# Patient Record
Sex: MALE | Race: BLACK OR AFRICAN AMERICAN | Employment: UNEMPLOYED | URBAN - METROPOLITAN AREA
[De-identification: names, ages, dates, MRNs, and addresses within clinical notes are randomized per-mention and may not be internally consistent; named-entity substitution may affect disease eponyms.]

---
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
## 2025-07-01 ENCOUNTER — APPOINTMENT (OUTPATIENT)
Dept: CT IMAGING | Age: 25
DRG: 720 | End: 2025-07-01
Payer: COMMERCIAL

## 2025-07-01 ENCOUNTER — APPOINTMENT (OUTPATIENT)
Dept: GENERAL RADIOLOGY | Age: 25
DRG: 720 | End: 2025-07-01
Payer: COMMERCIAL

## 2025-07-01 ENCOUNTER — HOSPITAL ENCOUNTER (INPATIENT)
Age: 25
LOS: 14 days | Discharge: NURSING, REHAB OR LONG-TERM CARE | DRG: 720 | End: 2025-07-15
Attending: EMERGENCY MEDICINE | Admitting: INTERNAL MEDICINE
Payer: COMMERCIAL

## 2025-07-01 DIAGNOSIS — R10.84 GENERALIZED ABDOMINAL PAIN: ICD-10-CM

## 2025-07-01 DIAGNOSIS — R65.10 SIRS (SYSTEMIC INFLAMMATORY RESPONSE SYNDROME) (HCC): ICD-10-CM

## 2025-07-01 DIAGNOSIS — E86.0 DEHYDRATION: Primary | ICD-10-CM

## 2025-07-01 DIAGNOSIS — I42.9 CARDIOMYOPATHY, UNSPECIFIED TYPE (HCC): ICD-10-CM

## 2025-07-01 DIAGNOSIS — F41.9 ANXIETY: ICD-10-CM

## 2025-07-01 PROBLEM — A41.9 SEPSIS (HCC): Status: ACTIVE | Noted: 2025-07-01

## 2025-07-01 LAB
ALBUMIN SERPL-MCNC: 4.1 G/DL (ref 3.4–5)
ALBUMIN/GLOB SERPL: 0.7 {RATIO} (ref 1.1–2.2)
ALP SERPL-CCNC: 139 U/L (ref 40–129)
ALT SERPL-CCNC: 8 U/L (ref 10–40)
ANION GAP SERPL CALCULATED.3IONS-SCNC: 19 MMOL/L (ref 3–16)
AST SERPL-CCNC: 20 U/L (ref 15–37)
BASOPHILS # BLD: 0.1 K/UL (ref 0–0.2)
BASOPHILS NFR BLD: 0.3 %
BILIRUB SERPL-MCNC: 0.6 MG/DL (ref 0–1)
BUN SERPL-MCNC: 38 MG/DL (ref 7–20)
CALCIUM SERPL-MCNC: 10.1 MG/DL (ref 8.3–10.6)
CHLORIDE SERPL-SCNC: 80 MMOL/L (ref 99–110)
CO2 SERPL-SCNC: 26 MMOL/L (ref 21–32)
CREAT SERPL-MCNC: 2.5 MG/DL (ref 0.9–1.3)
DEPRECATED RDW RBC AUTO: 17 % (ref 12.4–15.4)
EOSINOPHIL # BLD: 0 K/UL (ref 0–0.6)
EOSINOPHIL NFR BLD: 0 %
GFR SERPLBLD CREATININE-BSD FMLA CKD-EPI: 36 ML/MIN/{1.73_M2}
GLUCOSE SERPL-MCNC: 138 MG/DL (ref 70–99)
HCT VFR BLD AUTO: 31 % (ref 40.5–52.5)
HGB BLD-MCNC: 9.6 G/DL (ref 13.5–17.5)
LACTATE BLDV-SCNC: 1.6 MMOL/L (ref 0.4–1.9)
LACTATE BLDV-SCNC: 3.5 MMOL/L (ref 0.4–2)
LIPASE SERPL-CCNC: 139 U/L (ref 13–60)
LYMPHOCYTES # BLD: 2.8 K/UL (ref 1–5.1)
LYMPHOCYTES NFR BLD: 12.9 %
MAGNESIUM SERPL-MCNC: 1.9 MG/DL (ref 1.8–2.4)
MCH RBC QN AUTO: 24.1 PG (ref 26–34)
MCHC RBC AUTO-ENTMCNC: 31.1 G/DL (ref 31–36)
MCV RBC AUTO: 77.6 FL (ref 80–100)
MONOCYTES # BLD: 1.3 K/UL (ref 0–1.3)
MONOCYTES NFR BLD: 5.7 %
NEUTROPHILS # BLD: 18 K/UL (ref 1.7–7.7)
NEUTROPHILS NFR BLD: 81.1 %
PHOSPHATE SERPL-MCNC: 4.8 MG/DL (ref 2.5–4.9)
PLATELET # BLD AUTO: 620 K/UL (ref 135–450)
PMV BLD AUTO: 7.4 FL (ref 5–10.5)
POTASSIUM SERPL-SCNC: 3.9 MMOL/L (ref 3.5–5.1)
PROT SERPL-MCNC: 9.6 G/DL (ref 6.4–8.2)
RBC # BLD AUTO: 3.99 M/UL (ref 4.2–5.9)
SODIUM SERPL-SCNC: 125 MMOL/L (ref 136–145)
WBC # BLD AUTO: 22.1 K/UL (ref 4–11)

## 2025-07-01 PROCEDURE — 36415 COLL VENOUS BLD VENIPUNCTURE: CPT

## 2025-07-01 PROCEDURE — 93005 ELECTROCARDIOGRAM TRACING: CPT | Performed by: EMERGENCY MEDICINE

## 2025-07-01 PROCEDURE — 6360000002 HC RX W HCPCS

## 2025-07-01 PROCEDURE — 6360000004 HC RX CONTRAST MEDICATION

## 2025-07-01 PROCEDURE — 85025 COMPLETE CBC W/AUTO DIFF WBC: CPT

## 2025-07-01 PROCEDURE — 84100 ASSAY OF PHOSPHORUS: CPT

## 2025-07-01 PROCEDURE — 2580000003 HC RX 258

## 2025-07-01 PROCEDURE — 96376 TX/PRO/DX INJ SAME DRUG ADON: CPT

## 2025-07-01 PROCEDURE — 87040 BLOOD CULTURE FOR BACTERIA: CPT

## 2025-07-01 PROCEDURE — 96366 THER/PROPH/DIAG IV INF ADDON: CPT

## 2025-07-01 PROCEDURE — 2060000000 HC ICU INTERMEDIATE R&B

## 2025-07-01 PROCEDURE — 96375 TX/PRO/DX INJ NEW DRUG ADDON: CPT

## 2025-07-01 PROCEDURE — 74177 CT ABD & PELVIS W/CONTRAST: CPT

## 2025-07-01 PROCEDURE — 96365 THER/PROPH/DIAG IV INF INIT: CPT

## 2025-07-01 PROCEDURE — 83605 ASSAY OF LACTIC ACID: CPT

## 2025-07-01 PROCEDURE — 80053 COMPREHEN METABOLIC PANEL: CPT

## 2025-07-01 PROCEDURE — 96361 HYDRATE IV INFUSION ADD-ON: CPT

## 2025-07-01 PROCEDURE — 99285 EMERGENCY DEPT VISIT HI MDM: CPT

## 2025-07-01 PROCEDURE — 87150 DNA/RNA AMPLIFIED PROBE: CPT

## 2025-07-01 PROCEDURE — 2580000003 HC RX 258: Performed by: PHYSICIAN ASSISTANT

## 2025-07-01 PROCEDURE — 83735 ASSAY OF MAGNESIUM: CPT

## 2025-07-01 PROCEDURE — 96367 TX/PROPH/DG ADDL SEQ IV INF: CPT

## 2025-07-01 PROCEDURE — 71045 X-RAY EXAM CHEST 1 VIEW: CPT

## 2025-07-01 PROCEDURE — 83690 ASSAY OF LIPASE: CPT

## 2025-07-01 PROCEDURE — 99223 1ST HOSP IP/OBS HIGH 75: CPT | Performed by: SURGERY

## 2025-07-01 RX ORDER — SENNOSIDES 8.6 MG/1
1 TABLET ORAL 2 TIMES DAILY
COMMUNITY

## 2025-07-01 RX ORDER — POTASSIUM CHLORIDE 7.45 MG/ML
10 INJECTION INTRAVENOUS PRN
Status: DISCONTINUED | OUTPATIENT
Start: 2025-07-01 | End: 2025-07-06 | Stop reason: SDUPTHER

## 2025-07-01 RX ORDER — MIDODRINE HYDROCHLORIDE 5 MG/1
5 TABLET ORAL EVERY 8 HOURS PRN
COMMUNITY

## 2025-07-01 RX ORDER — POLYETHYLENE GLYCOL 3350 17 G/17G
17 POWDER, FOR SOLUTION ORAL
COMMUNITY

## 2025-07-01 RX ORDER — ONDANSETRON 4 MG/1
4 TABLET, FILM COATED ORAL
COMMUNITY

## 2025-07-01 RX ORDER — HYDROMORPHONE HYDROCHLORIDE 1 MG/ML
0.5 INJECTION, SOLUTION INTRAMUSCULAR; INTRAVENOUS; SUBCUTANEOUS EVERY 4 HOURS PRN
Status: DISCONTINUED | OUTPATIENT
Start: 2025-07-01 | End: 2025-07-02

## 2025-07-01 RX ORDER — ENOXAPARIN SODIUM 100 MG/ML
40 INJECTION SUBCUTANEOUS DAILY
Status: DISCONTINUED | OUTPATIENT
Start: 2025-07-02 | End: 2025-07-01

## 2025-07-01 RX ORDER — PANTOPRAZOLE SODIUM 40 MG/1
40 FOR SUSPENSION ORAL
COMMUNITY

## 2025-07-01 RX ORDER — METOPROLOL SUCCINATE 100 MG/1
100 TABLET, EXTENDED RELEASE ORAL DAILY
Status: ON HOLD | COMMUNITY
End: 2025-07-15

## 2025-07-01 RX ORDER — PROMETHAZINE HYDROCHLORIDE 25 MG/1
25 TABLET ORAL EVERY 6 HOURS PRN
COMMUNITY

## 2025-07-01 RX ORDER — SODIUM CHLORIDE 9 MG/ML
INJECTION, SOLUTION INTRAVENOUS CONTINUOUS
Status: ACTIVE | OUTPATIENT
Start: 2025-07-01 | End: 2025-07-02

## 2025-07-01 RX ORDER — 0.9 % SODIUM CHLORIDE 0.9 %
500 INTRAVENOUS SOLUTION INTRAVENOUS ONCE
Status: COMPLETED | OUTPATIENT
Start: 2025-07-01 | End: 2025-07-01

## 2025-07-01 RX ORDER — POTASSIUM CHLORIDE 1.5 G/1.58G
20 POWDER, FOR SOLUTION ORAL DAILY
Status: ON HOLD | COMMUNITY
End: 2025-07-15

## 2025-07-01 RX ORDER — M-VIT,TX,IRON,MINS/CALC/FOLIC 27MG-0.4MG
1 TABLET ORAL DAILY
COMMUNITY

## 2025-07-01 RX ORDER — BENZOYL PEROXIDE 100 MG/ML
LOTION TOPICAL
COMMUNITY

## 2025-07-01 RX ORDER — ACETAMINOPHEN 500 MG
500 TABLET ORAL EVERY 6 HOURS PRN
COMMUNITY

## 2025-07-01 RX ORDER — ONDANSETRON 2 MG/ML
4 INJECTION INTRAMUSCULAR; INTRAVENOUS EVERY 6 HOURS PRN
Status: DISCONTINUED | OUTPATIENT
Start: 2025-07-01 | End: 2025-07-15 | Stop reason: HOSPADM

## 2025-07-01 RX ORDER — POLYETHYLENE GLYCOL 3350 17 G/17G
17 POWDER, FOR SOLUTION ORAL DAILY PRN
Status: DISCONTINUED | OUTPATIENT
Start: 2025-07-01 | End: 2025-07-02

## 2025-07-01 RX ORDER — DULOXETIN HYDROCHLORIDE 30 MG/1
30 CAPSULE, DELAYED RELEASE ORAL DAILY
COMMUNITY
End: 2025-07-05

## 2025-07-01 RX ORDER — ACETAMINOPHEN 325 MG/1
650 TABLET ORAL EVERY 6 HOURS PRN
Status: DISCONTINUED | OUTPATIENT
Start: 2025-07-01 | End: 2025-07-02 | Stop reason: SDUPTHER

## 2025-07-01 RX ORDER — HEPARIN SODIUM 5000 [USP'U]/ML
5000 INJECTION, SOLUTION INTRAVENOUS; SUBCUTANEOUS 2 TIMES DAILY
Status: DISCONTINUED | OUTPATIENT
Start: 2025-07-01 | End: 2025-07-02

## 2025-07-01 RX ORDER — ACETAMINOPHEN 650 MG/1
650 SUPPOSITORY RECTAL EVERY 6 HOURS PRN
Status: DISCONTINUED | OUTPATIENT
Start: 2025-07-01 | End: 2025-07-02 | Stop reason: SDUPTHER

## 2025-07-01 RX ORDER — SODIUM CHLORIDE 0.9 % (FLUSH) 0.9 %
5-40 SYRINGE (ML) INJECTION PRN
Status: DISCONTINUED | OUTPATIENT
Start: 2025-07-01 | End: 2025-07-15 | Stop reason: HOSPADM

## 2025-07-01 RX ORDER — DICYCLOMINE HYDROCHLORIDE 10 MG/1
10 CAPSULE ORAL
COMMUNITY

## 2025-07-01 RX ORDER — SODIUM CHLORIDE, SODIUM LACTATE, POTASSIUM CHLORIDE, AND CALCIUM CHLORIDE .6; .31; .03; .02 G/100ML; G/100ML; G/100ML; G/100ML
1000 INJECTION, SOLUTION INTRAVENOUS ONCE
Status: COMPLETED | OUTPATIENT
Start: 2025-07-01 | End: 2025-07-01

## 2025-07-01 RX ORDER — MIRTAZAPINE 15 MG/1
15 TABLET, FILM COATED ORAL NIGHTLY
COMMUNITY

## 2025-07-01 RX ORDER — ACETAMINOPHEN 500 MG
1000 TABLET ORAL EVERY 6 HOURS
Status: DISCONTINUED | OUTPATIENT
Start: 2025-07-01 | End: 2025-07-01

## 2025-07-01 RX ORDER — OXYCODONE HYDROCHLORIDE 5 MG/1
5 TABLET ORAL EVERY 4 HOURS PRN
Refills: 0 | Status: DISCONTINUED | OUTPATIENT
Start: 2025-07-01 | End: 2025-07-01

## 2025-07-01 RX ORDER — CLINDAMYCIN PHOSPHATE 10 MG/G
GEL TOPICAL
COMMUNITY

## 2025-07-01 RX ORDER — IOPAMIDOL 755 MG/ML
75 INJECTION, SOLUTION INTRAVASCULAR
Status: COMPLETED | OUTPATIENT
Start: 2025-07-01 | End: 2025-07-01

## 2025-07-01 RX ORDER — MAGNESIUM SULFATE IN WATER 40 MG/ML
2000 INJECTION, SOLUTION INTRAVENOUS PRN
Status: DISCONTINUED | OUTPATIENT
Start: 2025-07-01 | End: 2025-07-15 | Stop reason: HOSPADM

## 2025-07-01 RX ORDER — HYDROMORPHONE HYDROCHLORIDE 1 MG/ML
1 INJECTION, SOLUTION INTRAMUSCULAR; INTRAVENOUS; SUBCUTANEOUS ONCE
Status: COMPLETED | OUTPATIENT
Start: 2025-07-01 | End: 2025-07-01

## 2025-07-01 RX ORDER — ONDANSETRON 4 MG/1
4 TABLET, ORALLY DISINTEGRATING ORAL EVERY 8 HOURS PRN
Status: DISCONTINUED | OUTPATIENT
Start: 2025-07-01 | End: 2025-07-15 | Stop reason: HOSPADM

## 2025-07-01 RX ORDER — VANCOMYCIN 750 MG/150ML
15 INJECTION, SOLUTION INTRAVENOUS ONCE
Status: DISCONTINUED | OUTPATIENT
Start: 2025-07-01 | End: 2025-07-01

## 2025-07-01 RX ORDER — SODIUM CHLORIDE 0.9 % (FLUSH) 0.9 %
5-40 SYRINGE (ML) INJECTION EVERY 12 HOURS SCHEDULED
Status: DISCONTINUED | OUTPATIENT
Start: 2025-07-01 | End: 2025-07-15 | Stop reason: HOSPADM

## 2025-07-01 RX ORDER — METRONIDAZOLE 500 MG/1
500 TABLET ORAL
COMMUNITY
End: 2025-09-19

## 2025-07-01 RX ORDER — IBUPROFEN 400 MG/1
400 TABLET, FILM COATED ORAL EVERY 6 HOURS PRN
Status: DISCONTINUED | OUTPATIENT
Start: 2025-07-01 | End: 2025-07-01

## 2025-07-01 RX ORDER — POTASSIUM CHLORIDE 1500 MG/1
40 TABLET, EXTENDED RELEASE ORAL PRN
Status: DISCONTINUED | OUTPATIENT
Start: 2025-07-01 | End: 2025-07-15 | Stop reason: HOSPADM

## 2025-07-01 RX ORDER — POTASSIUM CHLORIDE 7.45 MG/ML
10 INJECTION INTRAVENOUS PRN
Status: DISCONTINUED | OUTPATIENT
Start: 2025-07-01 | End: 2025-07-15 | Stop reason: HOSPADM

## 2025-07-01 RX ORDER — SODIUM CHLORIDE 9 MG/ML
INJECTION, SOLUTION INTRAVENOUS PRN
Status: DISCONTINUED | OUTPATIENT
Start: 2025-07-01 | End: 2025-07-15 | Stop reason: HOSPADM

## 2025-07-01 RX ORDER — SCOPOLAMINE 1 MG/3D
1 PATCH, EXTENDED RELEASE TRANSDERMAL
COMMUNITY

## 2025-07-01 RX ADMIN — HYDROMORPHONE HYDROCHLORIDE 1 MG: 1 INJECTION, SOLUTION INTRAMUSCULAR; INTRAVENOUS; SUBCUTANEOUS at 17:24

## 2025-07-01 RX ADMIN — HYDROMORPHONE HYDROCHLORIDE 0.5 MG: 1 INJECTION, SOLUTION INTRAMUSCULAR; INTRAVENOUS; SUBCUTANEOUS at 21:04

## 2025-07-01 RX ADMIN — SODIUM CHLORIDE 500 ML: 0.9 INJECTION, SOLUTION INTRAVENOUS at 18:54

## 2025-07-01 RX ADMIN — SODIUM CHLORIDE, SODIUM LACTATE, POTASSIUM CHLORIDE, AND CALCIUM CHLORIDE 1000 ML: .6; .31; .03; .02 INJECTION, SOLUTION INTRAVENOUS at 17:24

## 2025-07-01 RX ADMIN — IOPAMIDOL 75 ML: 755 INJECTION, SOLUTION INTRAVENOUS at 18:12

## 2025-07-01 RX ADMIN — SODIUM CHLORIDE, SODIUM LACTATE, POTASSIUM CHLORIDE, AND CALCIUM CHLORIDE 1000 ML: .6; .31; .03; .02 INJECTION, SOLUTION INTRAVENOUS at 19:32

## 2025-07-01 RX ADMIN — PIPERACILLIN AND TAZOBACTAM 4500 MG: 4; .5 INJECTION, POWDER, LYOPHILIZED, FOR SOLUTION INTRAVENOUS; PARENTERAL at 18:53

## 2025-07-01 RX ADMIN — SODIUM CHLORIDE 1500 MG: 0.9 INJECTION, SOLUTION INTRAVENOUS at 20:14

## 2025-07-01 ASSESSMENT — PAIN SCALES - GENERAL
PAINLEVEL_OUTOF10: 10
PAINLEVEL_OUTOF10: 10

## 2025-07-01 ASSESSMENT — PAIN DESCRIPTION - DESCRIPTORS: DESCRIPTORS: SHARP;STABBING

## 2025-07-01 ASSESSMENT — PAIN DESCRIPTION - FREQUENCY: FREQUENCY: CONTINUOUS

## 2025-07-01 ASSESSMENT — PAIN - FUNCTIONAL ASSESSMENT
PAIN_FUNCTIONAL_ASSESSMENT: PREVENTS OR INTERFERES SOME ACTIVE ACTIVITIES AND ADLS
PAIN_FUNCTIONAL_ASSESSMENT: 0-10

## 2025-07-01 ASSESSMENT — PAIN DESCRIPTION - LOCATION: LOCATION: ABDOMEN

## 2025-07-01 ASSESSMENT — PAIN DESCRIPTION - PAIN TYPE: TYPE: ACUTE PAIN

## 2025-07-01 ASSESSMENT — PAIN DESCRIPTION - ORIENTATION: ORIENTATION: LEFT;LOWER

## 2025-07-01 NOTE — ED PROVIDER NOTES
ED Attending Attestation Note     Date of evaluation: 7/1/2025    This patient was seen by the resident.  I have seen and examined the patient, agree with the workup, evaluation, management and diagnosis. The care plan has been discussed.  I have reviewed the ECG and concur with the resident's interpretation.  My assessment reveals left lower quadrant tenderness to palpation without rebound or guarding.     Dipika Erickson MD  07/01/25 [id number]

## 2025-07-01 NOTE — PLAN OF CARE
Patient requires an emergent CT abdomen/pelvis with IV contrast for further workup of his abdominal pain.  Concerns about renal injury/elevated creatinine should be waived.    Jyoti Moreno MD

## 2025-07-01 NOTE — ED PROVIDER NOTES
THE [facility]  EMERGENCY DEPARTMENT ENCOUNTER          EM RESIDENT NOTE       Date of evaluation: 7/1/2025    Chief Complaint     Abnormal Lab (Pt coming from snf for abnormal lab, when ems asked what lab was abnormal snf nurse replied \"all of them\") and Abdominal Pain (Pt c/o abd pain starting 2 days ago)      History of Present Illness     Barbra Cortés is a 25 y.o. male with a complex past medical history including hidradenitis suppurativa, Crohn's disease with multiple complications including ileostomy, prior small bowel obstruction, perianal abscess, multiple nonhealing wounds who presents with abdominal pain, abnormal outpatient labs.    Patient reports that he has been experiencing 3 days of abdominal pain located in his left lower abdomen.  Describes the pain as constant in nature.  He reports normal, nonbloody output from his ileostomy, and also denies associated nausea, vomiting, fevers, chills.  Denies any increased pain or drainage from his sacral wound.    He reports that he had abnormal outpatient labs performed from his facility, but is unsure what the specific abnormalities were    MEDICAL DECISION MAKING / ASSESSMENT / PLAN     INITIAL VITALS: BP: 113/77, Temp: 97.7 °F (36.5 °C), Pulse: (!) 130, Respirations: 16, SpO2: 96 %    Barbra Cortés is a 25 y.o. male with a complex past medical history related to his Crohn's disease with multiple intra-abdominal complications who presented to the emergency department with left lower quadrant abdominal pain.  Upon arrival to the emergency department, vital signs were notable for tachycardia with an initial heart rate of 130, otherwise within normal limits.  On exam, patient is somewhat diaphoretic but is awake, alert, and nontoxic in appearance.  His examination is notable for diffuse tenderness to palpation throughout the abdomen with guarding.  He has a stage IV sacral decubitus ulcer with malodorous drainage without focal fluctuance or

## 2025-07-02 PROBLEM — E86.0 DEHYDRATION: Status: ACTIVE | Noted: 2025-07-02

## 2025-07-02 LAB
ALBUMIN SERPL-MCNC: 3.1 G/DL (ref 3.4–5)
ALP SERPL-CCNC: 94 U/L (ref 40–129)
ALT SERPL-CCNC: 9 U/L (ref 10–40)
ANION GAP SERPL CALCULATED.3IONS-SCNC: 15 MMOL/L (ref 3–16)
AST SERPL-CCNC: 19 U/L (ref 15–37)
BACTERIA URNS QL MICRO: ABNORMAL /HPF
BASOPHILS # BLD: 0 K/UL (ref 0–0.2)
BASOPHILS NFR BLD: 0.2 %
BILIRUB DIRECT SERPL-MCNC: <0.1 MG/DL (ref 0–0.3)
BILIRUB INDIRECT SERPL-MCNC: 0.2 MG/DL (ref 0–1)
BILIRUB SERPL-MCNC: 0.3 MG/DL (ref 0–1)
BILIRUB UR QL STRIP.AUTO: NEGATIVE
BUN SERPL-MCNC: 32 MG/DL (ref 7–20)
CALCIUM SERPL-MCNC: 9.2 MG/DL (ref 8.3–10.6)
CHLORIDE SERPL-SCNC: 88 MMOL/L (ref 99–110)
CLARITY UR: CLEAR
CO2 SERPL-SCNC: 25 MMOL/L (ref 21–32)
COLOR UR: YELLOW
CREAT SERPL-MCNC: 1.8 MG/DL (ref 0.9–1.3)
CRYSTALS URNS MICRO: ABNORMAL /HPF
DEPRECATED RDW RBC AUTO: 17.2 % (ref 12.4–15.4)
EKG ATRIAL RATE: 131 BPM
EKG DIAGNOSIS: NORMAL
EKG P AXIS: 86 DEGREES
EKG P-R INTERVAL: 134 MS
EKG Q-T INTERVAL: 306 MS
EKG QRS DURATION: 74 MS
EKG QTC CALCULATION (BAZETT): 451 MS
EKG R AXIS: -48 DEGREES
EKG T AXIS: 71 DEGREES
EKG VENTRICULAR RATE: 131 BPM
EOSINOPHIL # BLD: 0 K/UL (ref 0–0.6)
EOSINOPHIL NFR BLD: 0.2 %
GFR SERPLBLD CREATININE-BSD FMLA CKD-EPI: 53 ML/MIN/{1.73_M2}
GLUCOSE SERPL-MCNC: 87 MG/DL (ref 70–99)
GLUCOSE UR STRIP.AUTO-MCNC: NEGATIVE MG/DL
HCT VFR BLD AUTO: 27.8 % (ref 40.5–52.5)
HGB BLD-MCNC: 8.7 G/DL (ref 13.5–17.5)
HGB UR QL STRIP.AUTO: ABNORMAL
KETONES UR STRIP.AUTO-MCNC: ABNORMAL MG/DL
LACTATE BLDV-SCNC: 1.4 MMOL/L (ref 0.4–1.9)
LEUKOCYTE ESTERASE UR QL STRIP.AUTO: ABNORMAL
LYMPHOCYTES # BLD: 1.2 K/UL (ref 1–5.1)
LYMPHOCYTES NFR BLD: 9.8 %
MAGNESIUM SERPL-MCNC: 1.28 MG/DL (ref 1.8–2.4)
MAGNESIUM SERPL-MCNC: 1.39 MG/DL (ref 1.8–2.4)
MCH RBC QN AUTO: 24.2 PG (ref 26–34)
MCHC RBC AUTO-ENTMCNC: 31.3 G/DL (ref 31–36)
MCV RBC AUTO: 77.5 FL (ref 80–100)
MONOCYTES # BLD: 0.6 K/UL (ref 0–1.3)
MONOCYTES NFR BLD: 4.3 %
NEUTROPHILS # BLD: 10.9 K/UL (ref 1.7–7.7)
NEUTROPHILS NFR BLD: 85.5 %
NITRITE UR QL STRIP.AUTO: NEGATIVE
PH UR STRIP.AUTO: 6 [PH] (ref 5–8)
PHOSPHATE SERPL-MCNC: 2.3 MG/DL (ref 2.5–4.9)
PHOSPHATE SERPL-MCNC: 3.1 MG/DL (ref 2.5–4.9)
PLATELET # BLD AUTO: 422 K/UL (ref 135–450)
PMV BLD AUTO: 7.8 FL (ref 5–10.5)
POTASSIUM SERPL-SCNC: 4.4 MMOL/L (ref 3.5–5.1)
PREALB SERPL-MCNC: 5.6 MG/DL (ref 20–40)
PROT SERPL-MCNC: 6.9 G/DL (ref 6.4–8.2)
PROT UR STRIP.AUTO-MCNC: 30 MG/DL
RBC # BLD AUTO: 3.59 M/UL (ref 4.2–5.9)
RBC #/AREA URNS HPF: ABNORMAL /HPF (ref 0–4)
RENAL EPI CELLS #/AREA UR COMP ASSIST: ABNORMAL /HPF (ref 0–1)
SODIUM SERPL-SCNC: 128 MMOL/L (ref 136–145)
SP GR UR STRIP.AUTO: 1.02 (ref 1–1.03)
UA COMPLETE W REFLEX CULTURE PNL UR: ABNORMAL
UA DIPSTICK W REFLEX MICRO PNL UR: YES
URN SPEC COLLECT METH UR: ABNORMAL
UROBILINOGEN UR STRIP-ACNC: 0.2 E.U./DL
VANCOMYCIN SERPL-MCNC: 37.7 UG/ML
WBC # BLD AUTO: 12.7 K/UL (ref 4–11)
WBC #/AREA URNS HPF: ABNORMAL /HPF (ref 0–5)

## 2025-07-02 PROCEDURE — 36415 COLL VENOUS BLD VENIPUNCTURE: CPT

## 2025-07-02 PROCEDURE — 2580000003 HC RX 258: Performed by: INTERNAL MEDICINE

## 2025-07-02 PROCEDURE — 2580000003 HC RX 258: Performed by: NURSE PRACTITIONER

## 2025-07-02 PROCEDURE — 80048 BASIC METABOLIC PNL TOTAL CA: CPT

## 2025-07-02 PROCEDURE — 80202 ASSAY OF VANCOMYCIN: CPT

## 2025-07-02 PROCEDURE — 85025 COMPLETE CBC W/AUTO DIFF WBC: CPT

## 2025-07-02 PROCEDURE — 6360000002 HC RX W HCPCS: Performed by: INTERNAL MEDICINE

## 2025-07-02 PROCEDURE — 83735 ASSAY OF MAGNESIUM: CPT

## 2025-07-02 PROCEDURE — 99232 SBSQ HOSP IP/OBS MODERATE 35: CPT | Performed by: SURGERY

## 2025-07-02 PROCEDURE — 2500000003 HC RX 250 WO HCPCS: Performed by: NURSE PRACTITIONER

## 2025-07-02 PROCEDURE — 81001 URINALYSIS AUTO W/SCOPE: CPT

## 2025-07-02 PROCEDURE — 6360000002 HC RX W HCPCS

## 2025-07-02 PROCEDURE — 87040 BLOOD CULTURE FOR BACTERIA: CPT

## 2025-07-02 PROCEDURE — 84100 ASSAY OF PHOSPHORUS: CPT

## 2025-07-02 PROCEDURE — 2060000000 HC ICU INTERMEDIATE R&B

## 2025-07-02 PROCEDURE — 6370000000 HC RX 637 (ALT 250 FOR IP): Performed by: INTERNAL MEDICINE

## 2025-07-02 PROCEDURE — 83605 ASSAY OF LACTIC ACID: CPT

## 2025-07-02 PROCEDURE — 2500000003 HC RX 250 WO HCPCS: Performed by: INTERNAL MEDICINE

## 2025-07-02 PROCEDURE — 6370000000 HC RX 637 (ALT 250 FOR IP): Performed by: NURSE PRACTITIONER

## 2025-07-02 PROCEDURE — 80076 HEPATIC FUNCTION PANEL: CPT

## 2025-07-02 PROCEDURE — 6360000002 HC RX W HCPCS: Performed by: NURSE PRACTITIONER

## 2025-07-02 PROCEDURE — 84134 ASSAY OF PREALBUMIN: CPT

## 2025-07-02 PROCEDURE — 93005 ELECTROCARDIOGRAM TRACING: CPT | Performed by: INTERNAL MEDICINE

## 2025-07-02 RX ORDER — POTASSIUM CHLORIDE 1500 MG/1
20 TABLET, EXTENDED RELEASE ORAL DAILY
Status: DISCONTINUED | OUTPATIENT
Start: 2025-07-02 | End: 2025-07-07

## 2025-07-02 RX ORDER — OXYCODONE HYDROCHLORIDE 5 MG/1
5 TABLET ORAL EVERY 4 HOURS PRN
Refills: 0 | Status: DISCONTINUED | OUTPATIENT
Start: 2025-07-02 | End: 2025-07-09

## 2025-07-02 RX ORDER — DULOXETIN HYDROCHLORIDE 30 MG/1
30 CAPSULE, DELAYED RELEASE ORAL DAILY
Status: DISCONTINUED | OUTPATIENT
Start: 2025-07-02 | End: 2025-07-09

## 2025-07-02 RX ORDER — ACETAMINOPHEN 500 MG
500 TABLET ORAL EVERY 6 HOURS PRN
Status: DISCONTINUED | OUTPATIENT
Start: 2025-07-02 | End: 2025-07-13

## 2025-07-02 RX ORDER — METOPROLOL SUCCINATE 100 MG/1
100 TABLET, EXTENDED RELEASE ORAL DAILY
Status: DISCONTINUED | OUTPATIENT
Start: 2025-07-02 | End: 2025-07-13

## 2025-07-02 RX ORDER — SENNOSIDES 8.6 MG/1
1 TABLET ORAL 2 TIMES DAILY
Status: DISCONTINUED | OUTPATIENT
Start: 2025-07-02 | End: 2025-07-03

## 2025-07-02 RX ORDER — OXYCODONE HYDROCHLORIDE 5 MG/1
5 TABLET ORAL EVERY 6 HOURS PRN
Refills: 0 | Status: DISCONTINUED | OUTPATIENT
Start: 2025-07-02 | End: 2025-07-02

## 2025-07-02 RX ORDER — HYDROMORPHONE HYDROCHLORIDE 1 MG/ML
0.5 INJECTION, SOLUTION INTRAMUSCULAR; INTRAVENOUS; SUBCUTANEOUS ONCE
Status: COMPLETED | OUTPATIENT
Start: 2025-07-02 | End: 2025-07-02

## 2025-07-02 RX ORDER — SCOPOLAMINE 1 MG/3D
1 PATCH, EXTENDED RELEASE TRANSDERMAL
Status: DISCONTINUED | OUTPATIENT
Start: 2025-07-02 | End: 2025-07-15 | Stop reason: HOSPADM

## 2025-07-02 RX ORDER — PROMETHAZINE HYDROCHLORIDE 25 MG/1
25 TABLET ORAL EVERY 6 HOURS PRN
Status: DISCONTINUED | OUTPATIENT
Start: 2025-07-02 | End: 2025-07-15 | Stop reason: HOSPADM

## 2025-07-02 RX ORDER — HYDROMORPHONE HYDROCHLORIDE 1 MG/ML
1 INJECTION, SOLUTION INTRAMUSCULAR; INTRAVENOUS; SUBCUTANEOUS ONCE
Status: COMPLETED | OUTPATIENT
Start: 2025-07-02 | End: 2025-07-02

## 2025-07-02 RX ORDER — METRONIDAZOLE 500 MG/1
500 TABLET ORAL
Status: DISCONTINUED | OUTPATIENT
Start: 2025-07-02 | End: 2025-07-15 | Stop reason: HOSPADM

## 2025-07-02 RX ORDER — DICYCLOMINE HYDROCHLORIDE 10 MG/1
10 CAPSULE ORAL
Status: DISCONTINUED | OUTPATIENT
Start: 2025-07-02 | End: 2025-07-12

## 2025-07-02 RX ORDER — MIRTAZAPINE 15 MG/1
15 TABLET, FILM COATED ORAL NIGHTLY
Status: DISCONTINUED | OUTPATIENT
Start: 2025-07-02 | End: 2025-07-09

## 2025-07-02 RX ORDER — POLYETHYLENE GLYCOL 3350 17 G/17G
17 POWDER, FOR SOLUTION ORAL
Status: DISCONTINUED | OUTPATIENT
Start: 2025-07-02 | End: 2025-07-15 | Stop reason: HOSPADM

## 2025-07-02 RX ORDER — ENOXAPARIN SODIUM 100 MG/ML
40 INJECTION SUBCUTANEOUS DAILY
Status: DISCONTINUED | OUTPATIENT
Start: 2025-07-02 | End: 2025-07-15 | Stop reason: HOSPADM

## 2025-07-02 RX ORDER — PANTOPRAZOLE SODIUM 40 MG/1
40 TABLET, DELAYED RELEASE ORAL
Status: DISCONTINUED | OUTPATIENT
Start: 2025-07-03 | End: 2025-07-15 | Stop reason: HOSPADM

## 2025-07-02 RX ORDER — CLINDAMYCIN PHOSPHATE 10 MG/G
GEL TOPICAL
Status: DISCONTINUED | OUTPATIENT
Start: 2025-07-02 | End: 2025-07-15 | Stop reason: HOSPADM

## 2025-07-02 RX ORDER — MIDODRINE HYDROCHLORIDE 5 MG/1
5 TABLET ORAL EVERY 8 HOURS PRN
Status: DISCONTINUED | OUTPATIENT
Start: 2025-07-02 | End: 2025-07-15 | Stop reason: HOSPADM

## 2025-07-02 RX ORDER — METOPROLOL TARTRATE 1 MG/ML
5 INJECTION, SOLUTION INTRAVENOUS ONCE
Status: COMPLETED | OUTPATIENT
Start: 2025-07-02 | End: 2025-07-02

## 2025-07-02 RX ADMIN — VANCOMYCIN HYDROCHLORIDE 750 MG: 750 INJECTION, POWDER, LYOPHILIZED, FOR SOLUTION INTRAVENOUS at 23:18

## 2025-07-02 RX ADMIN — MIRTAZAPINE 15 MG: 15 TABLET, FILM COATED ORAL at 22:11

## 2025-07-02 RX ADMIN — HYDROMORPHONE HYDROCHLORIDE 0.5 MG: 1 INJECTION, SOLUTION INTRAMUSCULAR; INTRAVENOUS; SUBCUTANEOUS at 01:47

## 2025-07-02 RX ADMIN — PIPERACILLIN AND TAZOBACTAM 3375 MG: 3; .375 INJECTION, POWDER, LYOPHILIZED, FOR SOLUTION INTRAVENOUS at 04:32

## 2025-07-02 RX ADMIN — DULOXETINE 30 MG: 30 CAPSULE, DELAYED RELEASE ORAL at 10:20

## 2025-07-02 RX ADMIN — HYDROMORPHONE HYDROCHLORIDE 0.5 MG: 1 INJECTION, SOLUTION INTRAMUSCULAR; INTRAVENOUS; SUBCUTANEOUS at 22:27

## 2025-07-02 RX ADMIN — DICYCLOMINE HYDROCHLORIDE 10 MG: 10 CAPSULE ORAL at 10:08

## 2025-07-02 RX ADMIN — SODIUM CHLORIDE, PRESERVATIVE FREE 10 ML: 5 INJECTION INTRAVENOUS at 20:00

## 2025-07-02 RX ADMIN — SODIUM CHLORIDE, PRESERVATIVE FREE 10 ML: 5 INJECTION INTRAVENOUS at 10:00

## 2025-07-02 RX ADMIN — CALCIUM POLYCARBOPHIL 1250 MG: 625 TABLET ORAL at 10:19

## 2025-07-02 RX ADMIN — HYDROMORPHONE HYDROCHLORIDE 0.5 MG: 1 INJECTION, SOLUTION INTRAMUSCULAR; INTRAVENOUS; SUBCUTANEOUS at 10:08

## 2025-07-02 RX ADMIN — PIPERACILLIN AND TAZOBACTAM 3375 MG: 3; .375 INJECTION, POWDER, LYOPHILIZED, FOR SOLUTION INTRAVENOUS at 18:32

## 2025-07-02 RX ADMIN — DICYCLOMINE HYDROCHLORIDE 10 MG: 10 CAPSULE ORAL at 15:44

## 2025-07-02 RX ADMIN — METOPROLOL TARTRATE 5 MG: 5 INJECTION INTRAVENOUS at 18:06

## 2025-07-02 RX ADMIN — CLINDAMYCIN PHOSPHATE GEL USP, 1%: 10 GEL TOPICAL at 23:16

## 2025-07-02 RX ADMIN — HYDROMORPHONE HYDROCHLORIDE 1 MG: 1 INJECTION, SOLUTION INTRAMUSCULAR; INTRAVENOUS; SUBCUTANEOUS at 05:27

## 2025-07-02 RX ADMIN — SENNOSIDES 8.6 MG: 8.6 TABLET, FILM COATED ORAL at 10:32

## 2025-07-02 RX ADMIN — DICYCLOMINE HYDROCHLORIDE 10 MG: 10 CAPSULE ORAL at 22:11

## 2025-07-02 RX ADMIN — SENNOSIDES 8.6 MG: 8.6 TABLET, FILM COATED ORAL at 22:11

## 2025-07-02 RX ADMIN — SODIUM CHLORIDE: 0.9 INJECTION, SOLUTION INTRAVENOUS at 19:59

## 2025-07-02 RX ADMIN — PIPERACILLIN AND TAZOBACTAM 3375 MG: 3; .375 INJECTION, POWDER, LYOPHILIZED, FOR SOLUTION INTRAVENOUS at 10:27

## 2025-07-02 RX ADMIN — METOPROLOL SUCCINATE 100 MG: 100 TABLET, EXTENDED RELEASE ORAL at 10:20

## 2025-07-02 RX ADMIN — METRONIDAZOLE 500 MG: 500 TABLET ORAL at 23:10

## 2025-07-02 RX ADMIN — MAGNESIUM SULFATE HEPTAHYDRATE 2000 MG: 40 INJECTION, SOLUTION INTRAVENOUS at 22:48

## 2025-07-02 RX ADMIN — POTASSIUM CHLORIDE 20 MEQ: 1500 TABLET, EXTENDED RELEASE ORAL at 10:20

## 2025-07-02 RX ADMIN — OXYCODONE 5 MG: 5 TABLET ORAL at 15:44

## 2025-07-02 RX ADMIN — SODIUM CHLORIDE: 0.9 INJECTION, SOLUTION INTRAVENOUS at 00:54

## 2025-07-02 RX ADMIN — SODIUM CHLORIDE, PRESERVATIVE FREE 10 ML: 5 INJECTION INTRAVENOUS at 01:48

## 2025-07-02 RX ADMIN — OXYCODONE 5 MG: 5 TABLET ORAL at 20:12

## 2025-07-02 ASSESSMENT — PAIN SCALES - GENERAL
PAINLEVEL_OUTOF10: 9
PAINLEVEL_OUTOF10: 10
PAINLEVEL_OUTOF10: 8
PAINLEVEL_OUTOF10: 10
PAINLEVEL_OUTOF10: 7
PAINLEVEL_OUTOF10: 10
PAINLEVEL_OUTOF10: 9
PAINLEVEL_OUTOF10: 10
PAINLEVEL_OUTOF10: 10
PAINLEVEL_OUTOF10: 8
PAINLEVEL_OUTOF10: 10

## 2025-07-02 ASSESSMENT — PAIN DESCRIPTION - ONSET
ONSET: ON-GOING

## 2025-07-02 ASSESSMENT — PAIN DESCRIPTION - PAIN TYPE
TYPE: ACUTE PAIN
TYPE: ACUTE PAIN
TYPE: ACUTE PAIN;CHRONIC PAIN
TYPE: ACUTE PAIN
TYPE: ACUTE PAIN;CHRONIC PAIN
TYPE: ACUTE PAIN;CHRONIC PAIN
TYPE: ACUTE PAIN
TYPE: ACUTE PAIN;CHRONIC PAIN

## 2025-07-02 ASSESSMENT — PAIN DESCRIPTION - LOCATION
LOCATION: BUTTOCKS
LOCATION: BUTTOCKS;RECTUM
LOCATION: RECTUM;ABDOMEN
LOCATION: RECTUM;ABDOMEN
LOCATION: ABDOMEN
LOCATION: RECTUM;ABDOMEN
LOCATION: BUTTOCKS;RECTUM
LOCATION: RECTUM;ABDOMEN

## 2025-07-02 ASSESSMENT — PAIN - FUNCTIONAL ASSESSMENT

## 2025-07-02 ASSESSMENT — PAIN DESCRIPTION - FREQUENCY
FREQUENCY: CONTINUOUS

## 2025-07-02 ASSESSMENT — PAIN DESCRIPTION - ORIENTATION
ORIENTATION: MID
ORIENTATION: MID
ORIENTATION: RIGHT;LEFT;LOWER;INNER
ORIENTATION: MID;LEFT;RIGHT;LOWER

## 2025-07-02 ASSESSMENT — PAIN DESCRIPTION - DESCRIPTORS
DESCRIPTORS: SHARP;STABBING
DESCRIPTORS: SHARP
DESCRIPTORS: SHARP
DESCRIPTORS: SHARP;STABBING
DESCRIPTORS: SHARP
DESCRIPTORS: SHARP
DESCRIPTORS: SHARP;STABBING
DESCRIPTORS: SHARP;STABBING

## 2025-07-02 NOTE — ED NOTES
Patient Name: Barbra Cortés  : 2000 25 y.o.  MRN: [id number]  ED Room #: A11/A11-11     Chief complaint:   Chief Complaint   Patient presents with    Abnormal Lab     Pt coming from snf for abnormal lab, when ems asked what lab was abnormal snf nurse replied \"all of them\"    Abdominal Pain     Pt c/o abd pain starting 2 days ago     Hospital Problem/Diagnosis:   Hospital Problems           Last Modified POA    * (Principal) Sepsis (HCC) 2025 Yes         O2 Flow Rate:O2 Device: None (Room air)   (if applicable)  Cardiac Rhythm:   (if applicable)  Active LDA's:   Peripheral IV 25 Left;Anterior Cephalic (Active)            How does patient ambulate? Unknown, did not assess in the Emergency Department    2. How does patient take pills? Unknown, no oral medications were given in the Emergency Department    3. Is patient alert? Alert    4. Is patient oriented? To Person, To Place, To Time, To Situation, and Follows Commands    5.   Patient arrived from:  nursing home  Facility Name: [facility]__________    6. If patient is disoriented or from a [facility] has family been notified of admission? No    7. Patient belongings? Belongings: eyeglasses, cell phone, ipad    Disposition of belongings? Kept with Patient     8. Any specific patient or family belongings/needs/dynamics?   a. Patient is in room air, with right lower abdomen ileostomy. Patient is a hard stick and we have one USG PIV on hie left upper arm    9. Miscellaneous comments/pending orders?  a. 2nd set of blood culture     If there are any additional questions please reach out to the Emergency Department.      Roz Palomino, DENISHA  [phone]

## 2025-07-02 NOTE — ED PROVIDER NOTES
THE [facility]  EMERGENCY DEPARTMENT ENCOUNTER          ADVANCED PRACTICE PROVIDER NOTE     Date of evaluation: 7/1/2025    ADDENDUM:      Care of this patient was assumed from my colleague, Jyoti Moreno MD.  The patient was previously evaluated by the prior emergency department team for Abnormal Lab (Pt coming from snf for abnormal lab, when ems asked what lab was abnormal snf nurse replied \"all of them\") and Abdominal Pain (Pt c/o abd pain starting 2 days ago)  .. Please see their note for the patient's history of present illness, and the prior ED [rovider's assessment, evaluation, diagnostics and plan.  The patient's initial evaluation and plan have been discussed with the prior provider who initially evaluated the patient. A repeat evaluation of the patient was performed by myself at the time of turnover. Nursing Notes, Past Medical Hx, Past Surgical Hx, Social Hx, Allergies, and Family Hx were all reviewed.    ASSESSMENT / PLAN  (MEDICAL DECISION MAKING)     Barbra Heredia is a 25 y.o. male with a complex medical history related to Crohn's disease who presents to the emergency department with a complaint of abdominal pain and distention with reported abnormal outpatient labs.   Briefly, the patient presented to the emergency department for left lower quadrant abdominal pain with decreased output from his ileostomy.  The patient is also noted to have stage IV sacral decubitus ulcer with malodorous , purulent drainage.  During the initial ED evaluation, the patient was found to have leukocytosis, lactic acidosis, hyponatremia/hypochloremia with anion gap, evidence of ROCÍO, and an elevated lipase.  A CT scan of the abdomen and pelvis was performed which shows concerns for a small bowel obstruction proximal to the small bowel as entry to the ostomy.  Surgery has been consulted and at the time of turnover, the patient was pending surgery recommendations and disposition.  The surgery team evaluated the

## 2025-07-02 NOTE — CARE COORDINATION
Case Management Assessment  Initial Evaluation    Date/Time of Evaluation: 7/2/2025 9:02 AM  Assessment Completed by: Garett Padron RN    If patient is discharged prior to next notation, then this note serves as note for discharge by case management.    Patient Name: Barbra Cortés                   YOB: 2000  Diagnosis: Dehydration [E86.0]  Generalized abdominal pain [R10.84]  SIRS (systemic inflammatory response syndrome) (HCC) [R65.10]  Sepsis (HCC) [A41.9]                   Date / Time: 7/1/2025  3:59 PM    Patient Admission Status: Inpatient   Readmission Risk (Low < 19, Mod (19-27), High > 27): Readmission Risk Score: 16    Current PCP: Jade Lucia MD  PCP verified by CM? Yes    Chart Reviewed: Yes      History Provided by: Patient, Medical Record, Other (see comment) (SNF)  Patient Orientation: Alert and Oriented    Patient Cognition: Alert    Hospitalization in the last 30 days (Readmission):  No    If yes, Readmission Assessment in CM Navigator will be completed and shown below.          Advance Directives:      Code Status: Full Code   Patient's Primary Decision Maker is: Legal Next of Kin      Discharge Planning:    Patient lives with: Family Members Type of Home: House  Primary Care Giver: Other (Comment) (family/SNF)  Patient Support Systems include: Family Members   Current Financial resources: Medicaid  Current community resources: None  Current services prior to admission: Skilled Nursing Facility ([facility])            Current DME:              Type of Home Care services:  None    ADLS  Prior functional level: Other (see comment) (dependent)  Current functional level: Other (see comment) (dependent)    PT AM-PAC:   /24  OT AM-PAC:   /24    Family can provide assistance at DC: No  Would you like Case Management to discuss the discharge plan with any other family members/significant others, and if so, who? Yes  Plans to Return to Present Housing: Unknown at

## 2025-07-02 NOTE — H&P
V2.0  History and Physical      Name:  Barbra Cortés /Age/Sex: 2000  (25 y.o. male)   MRN & CSN:  [id number] & [id number] Encounter Date/Time: 2025 6:03 AM EDT   Location:  [address] [place] [address] PCP: Jade Lucia MD       Hospital Day: 2    Assessment and Plan:   Barbar Cortés is a 25 y.o. male who has received most of his medical care at [facility] and was discharged from  just 2 weeks ago -lives at home ( chart scheduled to be merged) with Hx of adjustment disorder, colonic and perianal Crohn's s/p lap TAC with end ileostomy (2023, [facility], [facility]) c/b SBO 2/2 ileostomy twisting s/p lap w/ ileostomy revision (2025) and chronic abdominal pain several non-healing wounds, sacral ulceration, and perianal crohns complications (Recent EUA, seton removal [facility]. 25) who presents with a 3-day history of left lower quadrant abdominal pain-CT imaging was consistent with a closed-loop obstruction-he also was septic with stage IV pressure ulcers on his back and hidradenitis suppurativa of his perineum with leukocytosis and hence this admission    #1 severe sepsis with stage IV pressure ulcers on his sacrum and coccyx and severe hidradenitis suppurativa of his perineum with hyponatremia, acute renal failure, leukocytosis of 22K present on admission    Vanco and Zosyn was given in the ER  Continue IV Zosyn  Continue IV fluids  Clear liquid diet  Consult wound ostomy  His skin findings are chronic but extensive  Patient is in a lot of pain and refuses exam and any movement  Monitor labs    #2 Crohn's disease with multiple complications status post colostomy and now with abdominal pain nausea and vomiting    Further management per general surgery  Pain control  IV fluids    #3 overall very complex patient with multiple care episodes at [facility] and may best be served there- no beds at     Hospital Problems           Last Modified POA    * (Principal) Sepsis (HCC) 2025 Yes

## 2025-07-02 NOTE — PLAN OF CARE
Problem: Discharge Planning  Goal: Discharge to home or other facility with appropriate resources  Outcome: Progressing     Problem: Pain  Goal: Verbalizes/displays adequate comfort level or baseline comfort level  Outcome: Progressing     Problem: Nutrition Deficit:  Goal: Optimize nutritional status  Outcome: Progressing     Problem: Skin/Tissue Integrity  Goal: Skin integrity remains intact  Description: 1.  Monitor for areas of redness and/or skin breakdown  2.  Assess vascular access sites hourly  3.  Every 4-6 hours minimum:  Change oxygen saturation probe site  4.  Every 4-6 hours:  If on nasal continuous positive airway pressure, respiratory therapy assess nares and determine need for appliance change or resting period  Outcome: Progressing

## 2025-07-03 LAB
ANION GAP SERPL CALCULATED.3IONS-SCNC: 12 MMOL/L (ref 3–16)
ANION GAP SERPL CALCULATED.3IONS-SCNC: 12 MMOL/L (ref 3–16)
BASOPHILS # BLD: 0 K/UL (ref 0–0.2)
BASOPHILS NFR BLD: 0.4 %
BUN SERPL-MCNC: 11 MG/DL (ref 7–20)
BUN SERPL-MCNC: 6 MG/DL (ref 7–20)
CALCIUM SERPL-MCNC: 7.9 MG/DL (ref 8.3–10.6)
CALCIUM SERPL-MCNC: 8.9 MG/DL (ref 8.3–10.6)
CHLORIDE SERPL-SCNC: 101 MMOL/L (ref 99–110)
CHLORIDE SERPL-SCNC: 93 MMOL/L (ref 99–110)
CO2 SERPL-SCNC: 22 MMOL/L (ref 21–32)
CO2 SERPL-SCNC: 26 MMOL/L (ref 21–32)
CREAT SERPL-MCNC: 0.5 MG/DL (ref 0.9–1.3)
CREAT SERPL-MCNC: 0.7 MG/DL (ref 0.9–1.3)
DEPRECATED RDW RBC AUTO: 17 % (ref 12.4–15.4)
EKG ATRIAL RATE: 131 BPM
EKG DIAGNOSIS: NORMAL
EKG P AXIS: 64 DEGREES
EKG P-R INTERVAL: 126 MS
EKG Q-T INTERVAL: 292 MS
EKG QRS DURATION: 76 MS
EKG QTC CALCULATION (BAZETT): 431 MS
EKG R AXIS: 71 DEGREES
EKG T AXIS: 95 DEGREES
EKG VENTRICULAR RATE: 131 BPM
EOSINOPHIL # BLD: 0 K/UL (ref 0–0.6)
EOSINOPHIL NFR BLD: 0.4 %
GFR SERPLBLD CREATININE-BSD FMLA CKD-EPI: >90 ML/MIN/{1.73_M2}
GFR SERPLBLD CREATININE-BSD FMLA CKD-EPI: >90 ML/MIN/{1.73_M2}
GLUCOSE SERPL-MCNC: 88 MG/DL (ref 70–99)
GLUCOSE SERPL-MCNC: 97 MG/DL (ref 70–99)
HCT VFR BLD AUTO: 25.1 % (ref 40.5–52.5)
HGB BLD-MCNC: 7.9 G/DL (ref 13.5–17.5)
LYMPHOCYTES # BLD: 0.9 K/UL (ref 1–5.1)
LYMPHOCYTES NFR BLD: 8.2 %
MAGNESIUM SERPL-MCNC: 1.49 MG/DL (ref 1.8–2.4)
MAGNESIUM SERPL-MCNC: 2.04 MG/DL (ref 1.8–2.4)
MCH RBC QN AUTO: 24.3 PG (ref 26–34)
MCHC RBC AUTO-ENTMCNC: 31.7 G/DL (ref 31–36)
MCV RBC AUTO: 76.6 FL (ref 80–100)
MONOCYTES # BLD: 0.6 K/UL (ref 0–1.3)
MONOCYTES NFR BLD: 5.1 %
NEUTROPHILS # BLD: 9.7 K/UL (ref 1.7–7.7)
NEUTROPHILS NFR BLD: 85.9 %
PHOSPHATE SERPL-MCNC: 1.8 MG/DL (ref 2.5–4.9)
PLATELET # BLD AUTO: 394 K/UL (ref 135–450)
PMV BLD AUTO: 7 FL (ref 5–10.5)
POTASSIUM SERPL-SCNC: 2.6 MMOL/L (ref 3.5–5.1)
POTASSIUM SERPL-SCNC: 3.3 MMOL/L (ref 3.5–5.1)
RBC # BLD AUTO: 3.27 M/UL (ref 4.2–5.9)
REPORT: NORMAL
SODIUM SERPL-SCNC: 131 MMOL/L (ref 136–145)
SODIUM SERPL-SCNC: 135 MMOL/L (ref 136–145)
WBC # BLD AUTO: 11.3 K/UL (ref 4–11)

## 2025-07-03 PROCEDURE — 99232 SBSQ HOSP IP/OBS MODERATE 35: CPT | Performed by: SURGERY

## 2025-07-03 PROCEDURE — 2500000003 HC RX 250 WO HCPCS: Performed by: STUDENT IN AN ORGANIZED HEALTH CARE EDUCATION/TRAINING PROGRAM

## 2025-07-03 PROCEDURE — 36415 COLL VENOUS BLD VENIPUNCTURE: CPT

## 2025-07-03 PROCEDURE — 6370000000 HC RX 637 (ALT 250 FOR IP): Performed by: NURSE PRACTITIONER

## 2025-07-03 PROCEDURE — 2500000003 HC RX 250 WO HCPCS: Performed by: INTERNAL MEDICINE

## 2025-07-03 PROCEDURE — 93010 ELECTROCARDIOGRAM REPORT: CPT | Performed by: INTERNAL MEDICINE

## 2025-07-03 PROCEDURE — 2060000000 HC ICU INTERMEDIATE R&B

## 2025-07-03 PROCEDURE — 2580000003 HC RX 258: Performed by: NURSE PRACTITIONER

## 2025-07-03 PROCEDURE — 6360000002 HC RX W HCPCS: Performed by: NURSE PRACTITIONER

## 2025-07-03 PROCEDURE — 6370000000 HC RX 637 (ALT 250 FOR IP): Performed by: INTERNAL MEDICINE

## 2025-07-03 PROCEDURE — 84100 ASSAY OF PHOSPHORUS: CPT

## 2025-07-03 PROCEDURE — 80048 BASIC METABOLIC PNL TOTAL CA: CPT

## 2025-07-03 PROCEDURE — 6360000002 HC RX W HCPCS: Performed by: INTERNAL MEDICINE

## 2025-07-03 PROCEDURE — 2580000003 HC RX 258: Performed by: INTERNAL MEDICINE

## 2025-07-03 PROCEDURE — 85025 COMPLETE CBC W/AUTO DIFF WBC: CPT

## 2025-07-03 PROCEDURE — 2580000003 HC RX 258: Performed by: STUDENT IN AN ORGANIZED HEALTH CARE EDUCATION/TRAINING PROGRAM

## 2025-07-03 PROCEDURE — 6370000000 HC RX 637 (ALT 250 FOR IP)

## 2025-07-03 PROCEDURE — 83735 ASSAY OF MAGNESIUM: CPT

## 2025-07-03 RX ORDER — POTASSIUM CHLORIDE 7.45 MG/ML
10 INJECTION INTRAVENOUS ONCE
Status: COMPLETED | OUTPATIENT
Start: 2025-07-03 | End: 2025-07-03

## 2025-07-03 RX ORDER — SODIUM CHLORIDE, SODIUM LACTATE, POTASSIUM CHLORIDE, CALCIUM CHLORIDE 600; 310; 30; 20 MG/100ML; MG/100ML; MG/100ML; MG/100ML
INJECTION, SOLUTION INTRAVENOUS CONTINUOUS
Status: DISCONTINUED | OUTPATIENT
Start: 2025-07-03 | End: 2025-07-03

## 2025-07-03 RX ORDER — POTASSIUM CHLORIDE 7.45 MG/ML
10 INJECTION INTRAVENOUS
Status: DISPENSED | OUTPATIENT
Start: 2025-07-03 | End: 2025-07-03

## 2025-07-03 RX ORDER — GABAPENTIN 100 MG/1
100 CAPSULE ORAL 3 TIMES DAILY PRN
Status: DISCONTINUED | OUTPATIENT
Start: 2025-07-03 | End: 2025-07-07

## 2025-07-03 RX ORDER — SODIUM CHLORIDE 9 MG/ML
INJECTION, SOLUTION INTRAVENOUS CONTINUOUS
Status: ACTIVE | OUTPATIENT
Start: 2025-07-03 | End: 2025-07-05

## 2025-07-03 RX ORDER — CALCIUM CARBONATE 500 MG/1
1000 TABLET, CHEWABLE ORAL ONCE
Status: COMPLETED | OUTPATIENT
Start: 2025-07-03 | End: 2025-07-03

## 2025-07-03 RX ADMIN — VANCOMYCIN HYDROCHLORIDE 750 MG: 750 INJECTION, POWDER, LYOPHILIZED, FOR SOLUTION INTRAVENOUS at 23:49

## 2025-07-03 RX ADMIN — MIRTAZAPINE 15 MG: 15 TABLET, FILM COATED ORAL at 21:07

## 2025-07-03 RX ADMIN — POTASSIUM CHLORIDE 10 MEQ: 10 INJECTION, SOLUTION INTRAVENOUS at 18:09

## 2025-07-03 RX ADMIN — SODIUM CHLORIDE, PRESERVATIVE FREE 10 ML: 5 INJECTION INTRAVENOUS at 21:07

## 2025-07-03 RX ADMIN — POTASSIUM CHLORIDE 40 MEQ: 1500 TABLET, EXTENDED RELEASE ORAL at 23:38

## 2025-07-03 RX ADMIN — DICYCLOMINE HYDROCHLORIDE 10 MG: 10 CAPSULE ORAL at 11:15

## 2025-07-03 RX ADMIN — DICYCLOMINE HYDROCHLORIDE 10 MG: 10 CAPSULE ORAL at 21:07

## 2025-07-03 RX ADMIN — SODIUM CHLORIDE, PRESERVATIVE FREE 10 ML: 5 INJECTION INTRAVENOUS at 03:22

## 2025-07-03 RX ADMIN — POTASSIUM CHLORIDE 10 MEQ: 10 INJECTION, SOLUTION INTRAVENOUS at 15:40

## 2025-07-03 RX ADMIN — METOPROLOL SUCCINATE 100 MG: 100 TABLET, EXTENDED RELEASE ORAL at 11:15

## 2025-07-03 RX ADMIN — ANTACID TABLETS 1000 MG: 500 TABLET, CHEWABLE ORAL at 23:38

## 2025-07-03 RX ADMIN — POTASSIUM CHLORIDE 10 MEQ: 10 INJECTION, SOLUTION INTRAVENOUS at 13:01

## 2025-07-03 RX ADMIN — POTASSIUM CHLORIDE 10 MEQ: 10 INJECTION, SOLUTION INTRAVENOUS at 14:06

## 2025-07-03 RX ADMIN — DULOXETINE 30 MG: 30 CAPSULE, DELAYED RELEASE ORAL at 11:15

## 2025-07-03 RX ADMIN — SODIUM PHOSPHATE, MONOBASIC, MONOHYDRATE AND SODIUM PHOSPHATE, DIBASIC, ANHYDROUS 20 MMOL: 142; 276 INJECTION, SOLUTION INTRAVENOUS at 14:02

## 2025-07-03 RX ADMIN — SODIUM CHLORIDE, PRESERVATIVE FREE 10 ML: 5 INJECTION INTRAVENOUS at 11:15

## 2025-07-03 RX ADMIN — POTASSIUM CHLORIDE 20 MEQ: 1500 TABLET, EXTENDED RELEASE ORAL at 11:15

## 2025-07-03 RX ADMIN — SODIUM CHLORIDE: 0.9 INJECTION, SOLUTION INTRAVENOUS at 13:00

## 2025-07-03 RX ADMIN — VANCOMYCIN HYDROCHLORIDE 750 MG: 750 INJECTION, POWDER, LYOPHILIZED, FOR SOLUTION INTRAVENOUS at 16:38

## 2025-07-03 RX ADMIN — PIPERACILLIN AND TAZOBACTAM 3375 MG: 3; .375 INJECTION, POWDER, LYOPHILIZED, FOR SOLUTION INTRAVENOUS at 19:27

## 2025-07-03 RX ADMIN — MAGNESIUM SULFATE HEPTAHYDRATE 2000 MG: 40 INJECTION, SOLUTION INTRAVENOUS at 23:49

## 2025-07-03 RX ADMIN — GABAPENTIN 100 MG: 100 CAPSULE ORAL at 23:38

## 2025-07-03 RX ADMIN — PIPERACILLIN AND TAZOBACTAM 3375 MG: 3; .375 INJECTION, POWDER, LYOPHILIZED, FOR SOLUTION INTRAVENOUS at 11:20

## 2025-07-03 RX ADMIN — PIPERACILLIN AND TAZOBACTAM 3375 MG: 3; .375 INJECTION, POWDER, LYOPHILIZED, FOR SOLUTION INTRAVENOUS at 03:21

## 2025-07-03 RX ADMIN — POTASSIUM CHLORIDE 10 MEQ: 10 INJECTION, SOLUTION INTRAVENOUS at 16:53

## 2025-07-03 ASSESSMENT — PAIN SCALES - GENERAL: PAINLEVEL_OUTOF10: 0

## 2025-07-03 NOTE — PLAN OF CARE
Problem: Discharge Planning  Goal: Discharge to home or other facility with appropriate resources  Outcome: Not Progressing  Flowsheets  Taken 7/3/2025 1727  Discharge to home or other facility with appropriate resources: Identify barriers to discharge with patient and caregiver  Taken 7/3/2025 1541  Discharge to home or other facility with appropriate resources: Identify barriers to discharge with patient and caregiver     Problem: Pain  Goal: Verbalizes/displays adequate comfort level or baseline comfort level  Outcome: Not Progressing  Flowsheets  Taken 7/3/2025 1727  Verbalizes/displays adequate comfort level or baseline comfort level:   Encourage patient to monitor pain and request assistance   Assess pain using appropriate pain scale  Taken 7/3/2025 1541  Verbalizes/displays adequate comfort level or baseline comfort level: Encourage patient to monitor pain and request assistance     Problem: Nutrition Deficit:  Goal: Optimize nutritional status  Outcome: Not Progressing  Flowsheets (Taken 7/3/2025 1727)  Nutrient intake appropriate for improving, restoring, or maintaining nutritional needs: Assess nutritional status and recommend course of action     Problem: Safety - Adult  Goal: Free from fall injury  Outcome: Not Progressing  Flowsheets  Taken 7/3/2025 1727  Free From Fall Injury: Instruct family/caregiver on patient safety  Taken 7/3/2025 1726  Free From Fall Injury: Instruct family/caregiver on patient safety     Problem: Confusion  Goal: Confusion, delirium, dementia, or psychosis is improved or at baseline  Description: INTERVENTIONS:  1. Assess for possible contributors to thought disturbance, including medications, impaired vision or hearing, underlying metabolic abnormalities, dehydration, psychiatric diagnoses, and notify attending LIP  2. [place] high risk fall precautions, as indicated  3. Provide frequent short contacts to provide reality reorientation, refocusing and direction  4. Decrease

## 2025-07-03 NOTE — PLAN OF CARE
Problem: Discharge Planning  Goal: Discharge to home or other facility with appropriate resources  7/3/2025 0255 by Ron Fajardo, RN  Outcome: Progressing  Flowsheets (Taken 7/3/2025 0245)  Discharge to home or other facility with appropriate resources: Identify barriers to discharge with patient and caregiver  Note: From LTC at [facility].  Case management following for assistance with D/C planning.       Problem: Pain  Goal: Verbalizes/displays adequate comfort level or baseline comfort level  7/3/2025 0255 by Ron Fajardo, RN  Outcome: Progressing  Flowsheets (Taken 7/3/2025 0237)  Verbalizes/displays adequate comfort level or baseline comfort level: Assess pain using appropriate pain scale  Note: Patient with complaints of 8/10 pain to rectal, coccyx area. Refusing to repositioning and assessment of wounds on initial assessment.  Medicated with Oxycodone per PRN order at 2012.  Patients states that Oxycodone doesn't work and want's to know why his Dilaudid was D/C'd.  Patient aggreable to try Oxycodone.  After 1 hour patient reports pain 10/10.  Perfect Serve Message sent to on-call hospitalist, JUN Boyce.  Order for Dilauidid IV x1 given.  Administered at 2227.  Patient reports pain 7/10 and tolerable after Dilaudid.  Allowed repositioning, assessment of wounds and dressing changes after.  Sleeping at this time.  RR 16.       Problem: Nutrition Deficit:  Goal: Optimize nutritional status  7/3/2025 0255 by Ron Fajardo, RN  Outcome: Progressing  Flowsheets (Taken 7/3/2025 0245)  Nutrient intake appropriate for improving, restoring, or maintaining nutritional needs: Recommend appropriate diets, oral nutritional supplements, and vitamin/mineral supplements  Note: Full liquid diet ordered.  Tolerating liquids.  Reports feeling hungry and wanted solid food.       Problem: Skin/Tissue Integrity  Goal: Skin integrity remains intact  Description: 1.  Monitor for areas of redness

## 2025-07-03 NOTE — CARE COORDINATION
11:58 AM  Spoke with Rosalio at [facility]; states that she will have the director call family to discuss placement, pt refusal of cares, and potentially not allowing patient to return.   Rosalio states that patient consistently refuses cares, therapy, etc, and has frequently re-admitted to the hospital.       Gen surg following; no planned intervention. Pt refusing to allow assessments.   Wound following; see note.     CM continuing to follow; pt may need new facility, which will require pt to work with therapy for evals for precert.     Electronically signed by Garett Padron RN, CM on 7/3/2025 at 12:03 PM.  Phone: [phone]  Fax: [phone]

## 2025-07-04 PROBLEM — R10.84 GENERALIZED ABDOMINAL PAIN: Status: ACTIVE | Noted: 2025-07-04

## 2025-07-04 PROBLEM — R65.10 SIRS (SYSTEMIC INFLAMMATORY RESPONSE SYNDROME) (HCC): Status: ACTIVE | Noted: 2025-07-04

## 2025-07-04 LAB
ANION GAP SERPL CALCULATED.3IONS-SCNC: 17 MMOL/L (ref 3–16)
BASOPHILS # BLD: 0 K/UL (ref 0–0.2)
BASOPHILS NFR BLD: 0.1 %
BUN SERPL-MCNC: 3 MG/DL (ref 7–20)
CALCIUM SERPL-MCNC: 8.3 MG/DL (ref 8.3–10.6)
CHLORIDE SERPL-SCNC: 99 MMOL/L (ref 99–110)
CO2 SERPL-SCNC: 20 MMOL/L (ref 21–32)
CREAT SERPL-MCNC: 0.6 MG/DL (ref 0.9–1.3)
DEPRECATED RDW RBC AUTO: 17.1 % (ref 12.4–15.4)
EOSINOPHIL # BLD: 0 K/UL (ref 0–0.6)
EOSINOPHIL NFR BLD: 0.1 %
GFR SERPLBLD CREATININE-BSD FMLA CKD-EPI: >90 ML/MIN/{1.73_M2}
GLUCOSE SERPL-MCNC: 110 MG/DL (ref 70–99)
HCT VFR BLD AUTO: 22.8 % (ref 40.5–52.5)
HGB BLD-MCNC: 7.2 G/DL (ref 13.5–17.5)
LYMPHOCYTES # BLD: 1.8 K/UL (ref 1–5.1)
LYMPHOCYTES NFR BLD: 11.4 %
MAGNESIUM SERPL-MCNC: 1.93 MG/DL (ref 1.8–2.4)
MCH RBC QN AUTO: 24.6 PG (ref 26–34)
MCHC RBC AUTO-ENTMCNC: 31.4 G/DL (ref 31–36)
MCV RBC AUTO: 78.1 FL (ref 80–100)
MONOCYTES # BLD: 1 K/UL (ref 0–1.3)
MONOCYTES NFR BLD: 6.3 %
NEUTROPHILS # BLD: 13 K/UL (ref 1.7–7.7)
NEUTROPHILS NFR BLD: 82.1 %
PHOSPHATE SERPL-MCNC: 1.4 MG/DL (ref 2.5–4.9)
PLATELET # BLD AUTO: 435 K/UL (ref 135–450)
PMV BLD AUTO: 6.9 FL (ref 5–10.5)
POTASSIUM SERPL-SCNC: 3.1 MMOL/L (ref 3.5–5.1)
RBC # BLD AUTO: 2.92 M/UL (ref 4.2–5.9)
SODIUM SERPL-SCNC: 136 MMOL/L (ref 136–145)
WBC # BLD AUTO: 15.8 K/UL (ref 4–11)

## 2025-07-04 PROCEDURE — 2580000003 HC RX 258: Performed by: NURSE PRACTITIONER

## 2025-07-04 PROCEDURE — 83735 ASSAY OF MAGNESIUM: CPT

## 2025-07-04 PROCEDURE — 2500000003 HC RX 250 WO HCPCS: Performed by: NURSE PRACTITIONER

## 2025-07-04 PROCEDURE — 2500000003 HC RX 250 WO HCPCS: Performed by: INTERNAL MEDICINE

## 2025-07-04 PROCEDURE — 80048 BASIC METABOLIC PNL TOTAL CA: CPT

## 2025-07-04 PROCEDURE — 6370000000 HC RX 637 (ALT 250 FOR IP): Performed by: INTERNAL MEDICINE

## 2025-07-04 PROCEDURE — 84100 ASSAY OF PHOSPHORUS: CPT

## 2025-07-04 PROCEDURE — 6360000002 HC RX W HCPCS: Performed by: NURSE PRACTITIONER

## 2025-07-04 PROCEDURE — 2060000000 HC ICU INTERMEDIATE R&B

## 2025-07-04 PROCEDURE — 99255 IP/OBS CONSLTJ NEW/EST HI 80: CPT | Performed by: INTERNAL MEDICINE

## 2025-07-04 PROCEDURE — 6370000000 HC RX 637 (ALT 250 FOR IP): Performed by: NURSE PRACTITIONER

## 2025-07-04 PROCEDURE — 6360000002 HC RX W HCPCS: Performed by: INTERNAL MEDICINE

## 2025-07-04 PROCEDURE — 85025 COMPLETE CBC W/AUTO DIFF WBC: CPT

## 2025-07-04 PROCEDURE — 36415 COLL VENOUS BLD VENIPUNCTURE: CPT

## 2025-07-04 PROCEDURE — 99232 SBSQ HOSP IP/OBS MODERATE 35: CPT | Performed by: SURGERY

## 2025-07-04 PROCEDURE — 2580000003 HC RX 258: Performed by: INTERNAL MEDICINE

## 2025-07-04 RX ORDER — MAGNESIUM SULFATE IN WATER 40 MG/ML
2000 INJECTION, SOLUTION INTRAVENOUS ONCE
Status: DISCONTINUED | OUTPATIENT
Start: 2025-07-04 | End: 2025-07-06

## 2025-07-04 RX ORDER — PREGABALIN 50 MG/1
100 CAPSULE ORAL 3 TIMES DAILY
Status: DISCONTINUED | OUTPATIENT
Start: 2025-07-04 | End: 2025-07-15 | Stop reason: HOSPADM

## 2025-07-04 RX ADMIN — PIPERACILLIN AND TAZOBACTAM 3375 MG: 3; .375 INJECTION, POWDER, LYOPHILIZED, FOR SOLUTION INTRAVENOUS at 11:09

## 2025-07-04 RX ADMIN — PIPERACILLIN AND TAZOBACTAM 3375 MG: 3; .375 INJECTION, POWDER, LYOPHILIZED, FOR SOLUTION INTRAVENOUS at 19:00

## 2025-07-04 RX ADMIN — MAGNESIUM SULFATE HEPTAHYDRATE 2000 MG: 40 INJECTION, SOLUTION INTRAVENOUS at 08:28

## 2025-07-04 RX ADMIN — ENOXAPARIN SODIUM 40 MG: 100 INJECTION SUBCUTANEOUS at 08:22

## 2025-07-04 RX ADMIN — POTASSIUM CHLORIDE 20 MEQ: 1500 TABLET, EXTENDED RELEASE ORAL at 08:24

## 2025-07-04 RX ADMIN — PREGABALIN 100 MG: 50 CAPSULE ORAL at 12:13

## 2025-07-04 RX ADMIN — OXYCODONE 5 MG: 5 TABLET ORAL at 15:21

## 2025-07-04 RX ADMIN — POTASSIUM PHOSPHATE, MONOBASIC AND POTASSIUM PHOSPHATE, DIBASIC 30 MMOL: 224; 236 INJECTION, SOLUTION, CONCENTRATE INTRAVENOUS at 11:04

## 2025-07-04 RX ADMIN — MIRTAZAPINE 15 MG: 15 TABLET, FILM COATED ORAL at 20:02

## 2025-07-04 RX ADMIN — SODIUM CHLORIDE, PRESERVATIVE FREE 10 ML: 5 INJECTION INTRAVENOUS at 08:22

## 2025-07-04 RX ADMIN — VANCOMYCIN HYDROCHLORIDE 750 MG: 750 INJECTION, POWDER, LYOPHILIZED, FOR SOLUTION INTRAVENOUS at 15:19

## 2025-07-04 RX ADMIN — CLINDAMYCIN PHOSPHATE GEL USP, 1%: 10 GEL TOPICAL at 20:02

## 2025-07-04 RX ADMIN — METOPROLOL SUCCINATE 100 MG: 100 TABLET, EXTENDED RELEASE ORAL at 08:22

## 2025-07-04 RX ADMIN — PREGABALIN 100 MG: 50 CAPSULE ORAL at 20:01

## 2025-07-04 RX ADMIN — DULOXETINE 30 MG: 30 CAPSULE, DELAYED RELEASE ORAL at 08:22

## 2025-07-04 RX ADMIN — DICYCLOMINE HYDROCHLORIDE 10 MG: 10 CAPSULE ORAL at 20:01

## 2025-07-04 RX ADMIN — OXYCODONE 5 MG: 5 TABLET ORAL at 08:44

## 2025-07-04 RX ADMIN — DICYCLOMINE HYDROCHLORIDE 10 MG: 10 CAPSULE ORAL at 06:08

## 2025-07-04 RX ADMIN — DICYCLOMINE HYDROCHLORIDE 10 MG: 10 CAPSULE ORAL at 11:08

## 2025-07-04 RX ADMIN — PIPERACILLIN AND TAZOBACTAM 3375 MG: 3; .375 INJECTION, POWDER, LYOPHILIZED, FOR SOLUTION INTRAVENOUS at 03:57

## 2025-07-04 RX ADMIN — PANTOPRAZOLE SODIUM 40 MG: 40 TABLET, DELAYED RELEASE ORAL at 06:08

## 2025-07-04 RX ADMIN — DICYCLOMINE HYDROCHLORIDE 10 MG: 10 CAPSULE ORAL at 18:59

## 2025-07-04 RX ADMIN — METRONIDAZOLE 500 MG: 500 TABLET ORAL at 20:01

## 2025-07-04 ASSESSMENT — PAIN SCALES - GENERAL
PAINLEVEL_OUTOF10: 0
PAINLEVEL_OUTOF10: 6
PAINLEVEL_OUTOF10: 0
PAINLEVEL_OUTOF10: 6
PAINLEVEL_OUTOF10: 0

## 2025-07-04 ASSESSMENT — PAIN DESCRIPTION - PAIN TYPE
TYPE: ACUTE PAIN
TYPE: ACUTE PAIN

## 2025-07-04 ASSESSMENT — PAIN DESCRIPTION - LOCATION
LOCATION: LEG
LOCATION: LEG

## 2025-07-04 ASSESSMENT — PAIN DESCRIPTION - ONSET
ONSET: ON-GOING
ONSET: ON-GOING

## 2025-07-04 ASSESSMENT — PAIN DESCRIPTION - ORIENTATION
ORIENTATION: RIGHT;LEFT;LOWER
ORIENTATION: RIGHT;LEFT;LOWER

## 2025-07-04 ASSESSMENT — PAIN DESCRIPTION - DESCRIPTORS
DESCRIPTORS: HYPERSENSITIVITY;BURNING
DESCRIPTORS: ACHING;TINGLING

## 2025-07-04 ASSESSMENT — PAIN - FUNCTIONAL ASSESSMENT
PAIN_FUNCTIONAL_ASSESSMENT: PREVENTS OR INTERFERES SOME ACTIVE ACTIVITIES AND ADLS
PAIN_FUNCTIONAL_ASSESSMENT: PREVENTS OR INTERFERES SOME ACTIVE ACTIVITIES AND ADLS

## 2025-07-04 ASSESSMENT — PAIN DESCRIPTION - FREQUENCY
FREQUENCY: CONTINUOUS
FREQUENCY: CONTINUOUS

## 2025-07-04 NOTE — PLAN OF CARE
Problem: Discharge Planning  Goal: Discharge to home or other facility with appropriate resources  7/3/2025 1727 by Lexi Jaime, RN  Outcome: Not Progressing  Flowsheets  Taken 7/3/2025 1727  Discharge to home or other facility with appropriate resources: Identify barriers to discharge with patient and caregiver  Taken 7/3/2025 1541  Discharge to home or other facility with appropriate resources: Identify barriers to discharge with patient and caregiver     Problem: Pain  Goal: Verbalizes/displays adequate comfort level or baseline comfort level  7/4/2025 0040 by Haydee Arriola, RN  Outcome: Not Progressing  Flowsheets (Taken 7/3/2025 1727 by Lexi Jaime, RN)  Verbalizes/displays adequate comfort level or baseline comfort level:   Encourage patient to monitor pain and request assistance   Assess pain using appropriate pain scale  7/3/2025 1727 by Lexi Jaime RN  Outcome: Not Progressing  Flowsheets  Taken 7/3/2025 1727  Verbalizes/displays adequate comfort level or baseline comfort level:   Encourage patient to monitor pain and request assistance   Assess pain using appropriate pain scale  Taken 7/3/2025 1541  Verbalizes/displays adequate comfort level or baseline comfort level: Encourage patient to monitor pain and request assistance     Problem: Nutrition Deficit:  Goal: Optimize nutritional status  7/3/2025 1727 by Lexi Jaime, RN  Outcome: Not Progressing  Flowsheets (Taken 7/3/2025 1727)  Nutrient intake appropriate for improving, restoring, or maintaining nutritional needs: Assess nutritional status and recommend course of action     Problem: Skin/Tissue Integrity  Goal: Skin integrity remains intact  Description: 1.  Monitor for areas of redness and/or skin breakdown  2.  Assess vascular access sites hourly  3.  Every 4-6 hours minimum:  Change oxygen saturation probe site  4.  Every 4-6 hours:  If on nasal continuous positive airway pressure, respiratory therapy assess nares and

## 2025-07-04 NOTE — PLAN OF CARE
Problem: Safety - Adult  Goal: Free from fall injury  Outcome: Progressing  Flowsheets (Taken 7/4/2025 1727)  Free From Fall Injury:   Based on caregiver fall risk screen, instruct family/caregiver to ask for assistance with transferring infant if caregiver noted to have fall risk factors   Instruct family/caregiver on patient safety     Problem: Pain  Goal: Verbalizes/displays adequate comfort level or baseline comfort level  Outcome: Progressing  Flowsheets (Taken 7/4/2025 1727)  Verbalizes/displays adequate comfort level or baseline comfort level:   Encourage patient to monitor pain and request assistance   Assess pain using appropriate pain scale   Administer analgesics based on type and severity of pain and evaluate response     Problem: Skin/Tissue Integrity  Goal: Skin integrity remains intact  Description: 1.  Monitor for areas of redness and/or skin breakdown  2.  Assess vascular access sites hourly  3.  Every 4-6 hours minimum:  Change oxygen saturation probe site  4.  Every 4-6 hours:  If on nasal continuous positive airway pressure, respiratory therapy assess nares and determine need for appliance change or resting period  Outcome: Not Progressing  Flowsheets (Taken 7/4/2025 1727)  Skin Integrity Remains Intact:   Monitor for areas of redness and/or skin breakdown   Every 4-6 hours minimum:  Change oxygen saturation probe site   Assess vascular access sites hourly  Note: Pt refusing most turns this shift. Allowed RN to elevate heels off of bed via pillows. Education provided given necessity of turns given pt's current wounds

## 2025-07-05 ENCOUNTER — APPOINTMENT (OUTPATIENT)
Dept: CT IMAGING | Age: 25
DRG: 720 | End: 2025-07-05
Payer: COMMERCIAL

## 2025-07-05 LAB
ALBUMIN SERPL-MCNC: 2.5 G/DL (ref 3.4–5)
ANION GAP SERPL CALCULATED.3IONS-SCNC: 12 MMOL/L (ref 3–16)
BACTERIA BLD CULT: ABNORMAL
BACTERIA BLD CULT: ABNORMAL
BASOPHILS # BLD: 0 K/UL (ref 0–0.2)
BASOPHILS NFR BLD: 0.4 %
BUN SERPL-MCNC: <2 MG/DL (ref 7–20)
CALCIUM SERPL-MCNC: 7.5 MG/DL (ref 8.3–10.6)
CHLORIDE SERPL-SCNC: 107 MMOL/L (ref 99–110)
CO2 SERPL-SCNC: 22 MMOL/L (ref 21–32)
CREAT SERPL-MCNC: 0.5 MG/DL (ref 0.9–1.3)
DEPRECATED RDW RBC AUTO: 17.9 % (ref 12.4–15.4)
EOSINOPHIL # BLD: 0.1 K/UL (ref 0–0.6)
EOSINOPHIL NFR BLD: 0.9 %
GFR SERPLBLD CREATININE-BSD FMLA CKD-EPI: >90 ML/MIN/{1.73_M2}
GLUCOSE SERPL-MCNC: 106 MG/DL (ref 70–99)
HCT VFR BLD AUTO: 23.4 % (ref 40.5–52.5)
HGB BLD-MCNC: 7.1 G/DL (ref 13.5–17.5)
LYMPHOCYTES # BLD: 1.6 K/UL (ref 1–5.1)
LYMPHOCYTES NFR BLD: 13.6 %
MAGNESIUM SERPL-MCNC: 1.37 MG/DL (ref 1.8–2.4)
MCH RBC QN AUTO: 24.5 PG (ref 26–34)
MCHC RBC AUTO-ENTMCNC: 30.4 G/DL (ref 31–36)
MCV RBC AUTO: 80.6 FL (ref 80–100)
MONOCYTES # BLD: 0.6 K/UL (ref 0–1.3)
MONOCYTES NFR BLD: 5.3 %
NEUTROPHILS # BLD: 9.5 K/UL (ref 1.7–7.7)
NEUTROPHILS NFR BLD: 79.8 %
ORGANISM: ABNORMAL
ORGANISM: ABNORMAL
PHOSPHATE SERPL-MCNC: 2.1 MG/DL (ref 2.5–4.9)
PLATELET # BLD AUTO: 415 K/UL (ref 135–450)
PMV BLD AUTO: 6.9 FL (ref 5–10.5)
POTASSIUM SERPL-SCNC: 3.2 MMOL/L (ref 3.5–5.1)
RBC # BLD AUTO: 2.9 M/UL (ref 4.2–5.9)
SODIUM SERPL-SCNC: 141 MMOL/L (ref 136–145)
VANCOMYCIN SERPL-MCNC: 17.2 UG/ML
VANCOMYCIN SERPL-MCNC: 4.6 UG/ML
WBC # BLD AUTO: 11.9 K/UL (ref 4–11)

## 2025-07-05 PROCEDURE — 85025 COMPLETE CBC W/AUTO DIFF WBC: CPT

## 2025-07-05 PROCEDURE — 2060000000 HC ICU INTERMEDIATE R&B

## 2025-07-05 PROCEDURE — 2500000003 HC RX 250 WO HCPCS: Performed by: INTERNAL MEDICINE

## 2025-07-05 PROCEDURE — 2580000003 HC RX 258: Performed by: INTERNAL MEDICINE

## 2025-07-05 PROCEDURE — 6360000002 HC RX W HCPCS: Performed by: NURSE PRACTITIONER

## 2025-07-05 PROCEDURE — 2500000003 HC RX 250 WO HCPCS

## 2025-07-05 PROCEDURE — 74176 CT ABD & PELVIS W/O CONTRAST: CPT

## 2025-07-05 PROCEDURE — 6360000002 HC RX W HCPCS: Performed by: INTERNAL MEDICINE

## 2025-07-05 PROCEDURE — 6370000000 HC RX 637 (ALT 250 FOR IP): Performed by: INTERNAL MEDICINE

## 2025-07-05 PROCEDURE — 6360000002 HC RX W HCPCS

## 2025-07-05 PROCEDURE — 80202 ASSAY OF VANCOMYCIN: CPT

## 2025-07-05 PROCEDURE — 83735 ASSAY OF MAGNESIUM: CPT

## 2025-07-05 PROCEDURE — 99232 SBSQ HOSP IP/OBS MODERATE 35: CPT | Performed by: SURGERY

## 2025-07-05 PROCEDURE — 2580000003 HC RX 258: Performed by: NURSE PRACTITIONER

## 2025-07-05 PROCEDURE — 80069 RENAL FUNCTION PANEL: CPT

## 2025-07-05 PROCEDURE — 6370000000 HC RX 637 (ALT 250 FOR IP)

## 2025-07-05 PROCEDURE — 36415 COLL VENOUS BLD VENIPUNCTURE: CPT

## 2025-07-05 PROCEDURE — 2580000003 HC RX 258

## 2025-07-05 PROCEDURE — 6370000000 HC RX 637 (ALT 250 FOR IP): Performed by: NURSE PRACTITIONER

## 2025-07-05 RX ORDER — IOPAMIDOL 755 MG/ML
75 INJECTION, SOLUTION INTRAVASCULAR
Status: DISCONTINUED | OUTPATIENT
Start: 2025-07-05 | End: 2025-07-15 | Stop reason: HOSPADM

## 2025-07-05 RX ORDER — MAGNESIUM SULFATE IN WATER 40 MG/ML
4000 INJECTION, SOLUTION INTRAVENOUS ONCE
Status: COMPLETED | OUTPATIENT
Start: 2025-07-05 | End: 2025-07-05

## 2025-07-05 RX ADMIN — DICYCLOMINE HYDROCHLORIDE 10 MG: 10 CAPSULE ORAL at 11:06

## 2025-07-05 RX ADMIN — PIPERACILLIN AND TAZOBACTAM 3375 MG: 3; .375 INJECTION, POWDER, LYOPHILIZED, FOR SOLUTION INTRAVENOUS at 04:46

## 2025-07-05 RX ADMIN — POTASSIUM CHLORIDE 40 MEQ: 1500 TABLET, EXTENDED RELEASE ORAL at 06:11

## 2025-07-05 RX ADMIN — PREGABALIN 100 MG: 50 CAPSULE ORAL at 11:04

## 2025-07-05 RX ADMIN — SODIUM CHLORIDE, PRESERVATIVE FREE 10 ML: 5 INJECTION INTRAVENOUS at 11:21

## 2025-07-05 RX ADMIN — DICYCLOMINE HYDROCHLORIDE 10 MG: 10 CAPSULE ORAL at 21:18

## 2025-07-05 RX ADMIN — DULOXETINE 30 MG: 30 CAPSULE, DELAYED RELEASE ORAL at 11:05

## 2025-07-05 RX ADMIN — MAGNESIUM SULFATE HEPTAHYDRATE 4000 MG: 40 INJECTION, SOLUTION INTRAVENOUS at 11:20

## 2025-07-05 RX ADMIN — METOPROLOL SUCCINATE 100 MG: 100 TABLET, EXTENDED RELEASE ORAL at 11:04

## 2025-07-05 RX ADMIN — OXYCODONE 5 MG: 5 TABLET ORAL at 21:18

## 2025-07-05 RX ADMIN — SODIUM CHLORIDE 25 ML: 0.9 INJECTION, SOLUTION INTRAVENOUS at 18:30

## 2025-07-05 RX ADMIN — POTASSIUM PHOSPHATE, MONOBASIC AND POTASSIUM PHOSPHATE, DIBASIC 20 MMOL: 224; 236 INJECTION, SOLUTION, CONCENTRATE INTRAVENOUS at 11:13

## 2025-07-05 RX ADMIN — DICYCLOMINE HYDROCHLORIDE 10 MG: 10 CAPSULE ORAL at 16:41

## 2025-07-05 RX ADMIN — PREGABALIN 100 MG: 50 CAPSULE ORAL at 16:39

## 2025-07-05 RX ADMIN — PANTOPRAZOLE SODIUM 40 MG: 40 TABLET, DELAYED RELEASE ORAL at 06:11

## 2025-07-05 RX ADMIN — VANCOMYCIN HYDROCHLORIDE 750 MG: 750 INJECTION, POWDER, LYOPHILIZED, FOR SOLUTION INTRAVENOUS at 02:56

## 2025-07-05 RX ADMIN — PREGABALIN 100 MG: 50 CAPSULE ORAL at 21:17

## 2025-07-05 RX ADMIN — SODIUM CHLORIDE 30 ML: 0.9 INJECTION, SOLUTION INTRAVENOUS at 11:15

## 2025-07-05 RX ADMIN — POTASSIUM CHLORIDE 20 MEQ: 1500 TABLET, EXTENDED RELEASE ORAL at 11:04

## 2025-07-05 RX ADMIN — SODIUM CHLORIDE 25 ML: 0.9 INJECTION, SOLUTION INTRAVENOUS at 11:19

## 2025-07-05 RX ADMIN — PIPERACILLIN AND TAZOBACTAM 3375 MG: 3; .375 INJECTION, POWDER, LYOPHILIZED, FOR SOLUTION INTRAVENOUS at 20:01

## 2025-07-05 RX ADMIN — DICYCLOMINE HYDROCHLORIDE 10 MG: 10 CAPSULE ORAL at 06:11

## 2025-07-05 RX ADMIN — MIRTAZAPINE 15 MG: 15 TABLET, FILM COATED ORAL at 21:38

## 2025-07-05 RX ADMIN — PIPERACILLIN AND TAZOBACTAM 3375 MG: 3; .375 INJECTION, POWDER, LYOPHILIZED, FOR SOLUTION INTRAVENOUS at 11:17

## 2025-07-05 RX ADMIN — OXYCODONE 5 MG: 5 TABLET ORAL at 16:40

## 2025-07-05 RX ADMIN — GABAPENTIN 100 MG: 100 CAPSULE ORAL at 21:38

## 2025-07-05 RX ADMIN — ACETAMINOPHEN 500 MG: 500 TABLET ORAL at 21:38

## 2025-07-05 RX ADMIN — VANCOMYCIN HYDROCHLORIDE 750 MG: 750 INJECTION, POWDER, LYOPHILIZED, FOR SOLUTION INTRAVENOUS at 18:32

## 2025-07-05 ASSESSMENT — PAIN SCALES - GENERAL
PAINLEVEL_OUTOF10: 10

## 2025-07-05 NOTE — PLAN OF CARE
Problem: Skin/Tissue Integrity  Goal: Skin integrity remains intact  Description: 1.  Monitor for areas of redness and/or skin breakdown  2.  Assess vascular access sites hourly  3.  Every 4-6 hours minimum:  Change oxygen saturation probe site  4.  Every 4-6 hours:  If on nasal continuous positive airway pressure, respiratory therapy assess nares and determine need for appliance change or resting period  7/4/2025 1727 by Gretchen Hawley, RN  Outcome: Not Progressing  Flowsheets (Taken 7/4/2025 1727)  Skin Integrity Remains Intact:   Monitor for areas of redness and/or skin breakdown   Every 4-6 hours minimum:  Change oxygen saturation probe site   Assess vascular access sites hourly  Note: Pt refusing most turns this shift. Allowed RN to elevate heels off of bed via pillows. Education provided given necessity of turns given pt's current wounds     Problem: Skin/Tissue Integrity  Goal: Skin integrity remains intact  Description: 1.  Monitor for areas of redness and/or skin breakdown  2.  Assess vascular access sites hourly  3.  Every 4-6 hours minimum:  Change oxygen saturation probe site  4.  Every 4-6 hours:  If on nasal continuous positive airway pressure, respiratory therapy assess nares and determine need for appliance change or resting period  7/4/2025 1727 by Gretchen Hawley, RN  Outcome: Not Progressing  Flowsheets (Taken 7/4/2025 1727)  Skin Integrity Remains Intact:   Monitor for areas of redness and/or skin breakdown   Every 4-6 hours minimum:  Change oxygen saturation probe site   Assess vascular access sites hourly  Note: Pt refusing most turns this shift. Allowed RN to elevate heels off of bed via pillows. Education provided given necessity of turns given pt's current wounds

## 2025-07-05 NOTE — PLAN OF CARE
Problem: Discharge Planning  Goal: Discharge to home or other facility with appropriate resources  Outcome: Progressing     Problem: Pain  Goal: Verbalizes/displays adequate comfort level or baseline comfort level  Outcome: Progressing     Problem: Nutrition Deficit:  Goal: Optimize nutritional status  Outcome: Progressing     Problem: Skin/Tissue Integrity  Goal: Skin integrity remains intact  Description: 1.  Monitor for areas of redness and/or skin breakdown  2.  Assess vascular access sites hourly  3.  Every 4-6 hours minimum:  Change oxygen saturation probe site  4.  Every 4-6 hours:  If on nasal continuous positive airway pressure, respiratory therapy assess nares and determine need for appliance change or resting period  Outcome: Progressing     Problem: Safety - Adult  Goal: Free from fall injury  Outcome: Progressing     Problem: Confusion  Goal: Confusion, delirium, dementia, or psychosis is improved or at baseline  Description: INTERVENTIONS:  1. Assess for possible contributors to thought disturbance, including medications, impaired vision or hearing, underlying metabolic abnormalities, dehydration, psychiatric diagnoses, and notify attending LIP  2. [place] high risk fall precautions, as indicated  3. Provide frequent short contacts to provide reality reorientation, refocusing and direction  4. Decrease environmental stimuli, including noise as appropriate  5. Monitor and intervene to maintain adequate nutrition, hydration, elimination, sleep and activity  6. If unable to ensure safety without constant attention obtain sitter and review sitter guidelines with assigned personnel  7. Initiate Psychosocial CNS and Spiritual Care consult, as indicated  Outcome: Progressing     Problem: Metabolic/Fluid and Electrolytes - Adult  Goal: Electrolytes maintained within normal limits  Outcome: Progressing     Problem: ABCDS Injury Assessment  Goal: Absence of physical injury  Outcome: Progressing

## 2025-07-06 LAB
ALBUMIN SERPL-MCNC: 2.4 G/DL (ref 3.4–5)
ANION GAP SERPL CALCULATED.3IONS-SCNC: 12 MMOL/L (ref 3–16)
BACTERIA BLD CULT ORG #2: NORMAL
BASOPHILS # BLD: 0.1 K/UL (ref 0–0.2)
BASOPHILS NFR BLD: 0.5 %
BUN SERPL-MCNC: <2 MG/DL (ref 7–20)
CALCIUM SERPL-MCNC: 7.7 MG/DL (ref 8.3–10.6)
CHLORIDE SERPL-SCNC: 103 MMOL/L (ref 99–110)
CO2 SERPL-SCNC: 22 MMOL/L (ref 21–32)
CREAT SERPL-MCNC: 0.5 MG/DL (ref 0.9–1.3)
DEPRECATED RDW RBC AUTO: 17.9 % (ref 12.4–15.4)
EOSINOPHIL # BLD: 0.2 K/UL (ref 0–0.6)
EOSINOPHIL NFR BLD: 1.4 %
GFR SERPLBLD CREATININE-BSD FMLA CKD-EPI: >90 ML/MIN/{1.73_M2}
GLUCOSE SERPL-MCNC: 93 MG/DL (ref 70–99)
HCT VFR BLD AUTO: 25.2 % (ref 40.5–52.5)
HGB BLD-MCNC: 7.8 G/DL (ref 13.5–17.5)
LYMPHOCYTES # BLD: 1.8 K/UL (ref 1–5.1)
LYMPHOCYTES NFR BLD: 13.5 %
MAGNESIUM SERPL-MCNC: 1.72 MG/DL (ref 1.8–2.4)
MCH RBC QN AUTO: 24.3 PG (ref 26–34)
MCHC RBC AUTO-ENTMCNC: 31.2 G/DL (ref 31–36)
MCV RBC AUTO: 78.1 FL (ref 80–100)
MONOCYTES # BLD: 1 K/UL (ref 0–1.3)
MONOCYTES NFR BLD: 7.7 %
NEUTROPHILS # BLD: 10.1 K/UL (ref 1.7–7.7)
NEUTROPHILS NFR BLD: 76.9 %
PHOSPHATE SERPL-MCNC: 2 MG/DL (ref 2.5–4.9)
PLATELET # BLD AUTO: 418 K/UL (ref 135–450)
PMV BLD AUTO: 7 FL (ref 5–10.5)
POTASSIUM SERPL-SCNC: 3.3 MMOL/L (ref 3.5–5.1)
RBC # BLD AUTO: 3.22 M/UL (ref 4.2–5.9)
SODIUM SERPL-SCNC: 137 MMOL/L (ref 136–145)
WBC # BLD AUTO: 13.1 K/UL (ref 4–11)

## 2025-07-06 PROCEDURE — 2580000003 HC RX 258: Performed by: NURSE PRACTITIONER

## 2025-07-06 PROCEDURE — 6360000002 HC RX W HCPCS: Performed by: INTERNAL MEDICINE

## 2025-07-06 PROCEDURE — 2060000000 HC ICU INTERMEDIATE R&B

## 2025-07-06 PROCEDURE — 83735 ASSAY OF MAGNESIUM: CPT

## 2025-07-06 PROCEDURE — 85025 COMPLETE CBC W/AUTO DIFF WBC: CPT

## 2025-07-06 PROCEDURE — 6360000002 HC RX W HCPCS: Performed by: NURSE PRACTITIONER

## 2025-07-06 PROCEDURE — 36415 COLL VENOUS BLD VENIPUNCTURE: CPT

## 2025-07-06 PROCEDURE — 80069 RENAL FUNCTION PANEL: CPT

## 2025-07-06 PROCEDURE — 2500000003 HC RX 250 WO HCPCS: Performed by: INTERNAL MEDICINE

## 2025-07-06 PROCEDURE — 6360000002 HC RX W HCPCS

## 2025-07-06 PROCEDURE — 6370000000 HC RX 637 (ALT 250 FOR IP): Performed by: NURSE PRACTITIONER

## 2025-07-06 PROCEDURE — 99232 SBSQ HOSP IP/OBS MODERATE 35: CPT | Performed by: SURGERY

## 2025-07-06 PROCEDURE — 2580000003 HC RX 258: Performed by: INTERNAL MEDICINE

## 2025-07-06 PROCEDURE — 2500000003 HC RX 250 WO HCPCS

## 2025-07-06 PROCEDURE — 2580000003 HC RX 258

## 2025-07-06 PROCEDURE — 6370000000 HC RX 637 (ALT 250 FOR IP): Performed by: INTERNAL MEDICINE

## 2025-07-06 RX ORDER — POTASSIUM CHLORIDE 7.45 MG/ML
10 INJECTION INTRAVENOUS
Status: COMPLETED | OUTPATIENT
Start: 2025-07-06 | End: 2025-07-06

## 2025-07-06 RX ORDER — MORPHINE SULFATE 2 MG/ML
1 INJECTION, SOLUTION INTRAMUSCULAR; INTRAVENOUS
Status: DISCONTINUED | OUTPATIENT
Start: 2025-07-06 | End: 2025-07-06

## 2025-07-06 RX ORDER — MAGNESIUM SULFATE IN WATER 40 MG/ML
2000 INJECTION, SOLUTION INTRAVENOUS ONCE
Status: COMPLETED | OUTPATIENT
Start: 2025-07-06 | End: 2025-07-06

## 2025-07-06 RX ADMIN — OXYCODONE 5 MG: 5 TABLET ORAL at 03:55

## 2025-07-06 RX ADMIN — POTASSIUM CHLORIDE 10 MEQ: 10 INJECTION, SOLUTION INTRAVENOUS at 11:57

## 2025-07-06 RX ADMIN — METRONIDAZOLE 500 MG: 500 TABLET ORAL at 00:13

## 2025-07-06 RX ADMIN — PREGABALIN 100 MG: 50 CAPSULE ORAL at 09:37

## 2025-07-06 RX ADMIN — DICYCLOMINE HYDROCHLORIDE 10 MG: 10 CAPSULE ORAL at 21:53

## 2025-07-06 RX ADMIN — MIRTAZAPINE 15 MG: 15 TABLET, FILM COATED ORAL at 21:53

## 2025-07-06 RX ADMIN — POTASSIUM CHLORIDE 20 MEQ: 1500 TABLET, EXTENDED RELEASE ORAL at 09:38

## 2025-07-06 RX ADMIN — PIPERACILLIN AND TAZOBACTAM 3375 MG: 3; .375 INJECTION, POWDER, LYOPHILIZED, FOR SOLUTION INTRAVENOUS at 04:15

## 2025-07-06 RX ADMIN — PREGABALIN 100 MG: 50 CAPSULE ORAL at 13:05

## 2025-07-06 RX ADMIN — PREGABALIN 100 MG: 50 CAPSULE ORAL at 21:53

## 2025-07-06 RX ADMIN — POTASSIUM CHLORIDE 10 MEQ: 10 INJECTION, SOLUTION INTRAVENOUS at 09:35

## 2025-07-06 RX ADMIN — SODIUM CHLORIDE, PRESERVATIVE FREE 10 ML: 5 INJECTION INTRAVENOUS at 11:01

## 2025-07-06 RX ADMIN — DICYCLOMINE HYDROCHLORIDE 10 MG: 10 CAPSULE ORAL at 18:00

## 2025-07-06 RX ADMIN — DICYCLOMINE HYDROCHLORIDE 10 MG: 10 CAPSULE ORAL at 09:38

## 2025-07-06 RX ADMIN — VANCOMYCIN HYDROCHLORIDE 750 MG: 750 INJECTION, POWDER, LYOPHILIZED, FOR SOLUTION INTRAVENOUS at 02:35

## 2025-07-06 RX ADMIN — METOPROLOL SUCCINATE 100 MG: 100 TABLET, EXTENDED RELEASE ORAL at 09:38

## 2025-07-06 RX ADMIN — DULOXETINE 30 MG: 30 CAPSULE, DELAYED RELEASE ORAL at 09:38

## 2025-07-06 RX ADMIN — MAGNESIUM SULFATE HEPTAHYDRATE 2000 MG: 40 INJECTION, SOLUTION INTRAVENOUS at 09:32

## 2025-07-06 RX ADMIN — PANTOPRAZOLE SODIUM 40 MG: 40 TABLET, DELAYED RELEASE ORAL at 09:48

## 2025-07-06 RX ADMIN — PIPERACILLIN AND TAZOBACTAM 3375 MG: 3; .375 INJECTION, POWDER, LYOPHILIZED, FOR SOLUTION INTRAVENOUS at 20:14

## 2025-07-06 RX ADMIN — CLINDAMYCIN PHOSPHATE GEL USP, 1%: 10 GEL TOPICAL at 00:14

## 2025-07-06 RX ADMIN — VANCOMYCIN HYDROCHLORIDE 1000 MG: 1 INJECTION, POWDER, LYOPHILIZED, FOR SOLUTION INTRAVENOUS at 16:17

## 2025-07-06 RX ADMIN — POTASSIUM CHLORIDE 10 MEQ: 10 INJECTION, SOLUTION INTRAVENOUS at 10:59

## 2025-07-06 RX ADMIN — ACETAMINOPHEN 500 MG: 500 TABLET ORAL at 03:55

## 2025-07-06 RX ADMIN — PIPERACILLIN AND TAZOBACTAM 3375 MG: 3; .375 INJECTION, POWDER, LYOPHILIZED, FOR SOLUTION INTRAVENOUS at 09:33

## 2025-07-06 RX ADMIN — POTASSIUM CHLORIDE 10 MEQ: 10 INJECTION, SOLUTION INTRAVENOUS at 12:57

## 2025-07-06 RX ADMIN — POTASSIUM PHOSPHATE, MONOBASIC AND POTASSIUM PHOSPHATE, DIBASIC 30 MMOL: 224; 236 INJECTION, SOLUTION, CONCENTRATE INTRAVENOUS at 09:34

## 2025-07-06 ASSESSMENT — PAIN DESCRIPTION - DESCRIPTORS: DESCRIPTORS: ACHING

## 2025-07-06 ASSESSMENT — PAIN SCALES - GENERAL
PAINLEVEL_OUTOF10: 9
PAINLEVEL_OUTOF10: 10
PAINLEVEL_OUTOF10: 10
PAINLEVEL_OUTOF10: 0

## 2025-07-06 ASSESSMENT — PAIN DESCRIPTION - FREQUENCY: FREQUENCY: INTERMITTENT

## 2025-07-06 ASSESSMENT — PAIN - FUNCTIONAL ASSESSMENT: PAIN_FUNCTIONAL_ASSESSMENT: ACTIVITIES ARE NOT PREVENTED

## 2025-07-06 ASSESSMENT — PAIN DESCRIPTION - PAIN TYPE: TYPE: ACUTE PAIN

## 2025-07-06 ASSESSMENT — PAIN SCALES - WONG BAKER: WONGBAKER_NUMERICALRESPONSE: NO HURT

## 2025-07-06 ASSESSMENT — PAIN DESCRIPTION - LOCATION: LOCATION: BUTTOCKS

## 2025-07-06 ASSESSMENT — PAIN DESCRIPTION - ORIENTATION: ORIENTATION: MID

## 2025-07-06 ASSESSMENT — PAIN DESCRIPTION - ONSET: ONSET: ON-GOING

## 2025-07-06 NOTE — PLAN OF CARE
Problem: Discharge Planning  Goal: Discharge to home or other facility with appropriate resources  Outcome: Progressing  Flowsheets  Taken 7/6/2025 1600  Discharge to home or other facility with appropriate resources:   Identify barriers to discharge with patient and caregiver   Arrange for needed discharge resources and transportation as appropriate   Identify discharge learning needs (meds, wound care, etc)   Refer to discharge planning if patient needs post-hospital services based on physician order or complex needs related to functional status, cognitive ability or social support system   Arrange for interpreters to assist at discharge as needed  Taken 7/6/2025 0800  Discharge to home or other facility with appropriate resources:   Identify barriers to discharge with patient and caregiver   Arrange for needed discharge resources and transportation as appropriate   Identify discharge learning needs (meds, wound care, etc)   Arrange for interpreters to assist at discharge as needed   Refer to discharge planning if patient needs post-hospital services based on physician order or complex needs related to functional status, cognitive ability or social support system     Problem: Pain  Goal: Verbalizes/displays adequate comfort level or baseline comfort level  Outcome: Not Progressing     Problem: Nutrition Deficit:  Goal: Optimize nutritional status  Outcome: Progressing     Problem: Skin/Tissue Integrity  Goal: Skin integrity remains intact  Description: 1.  Monitor for areas of redness and/or skin breakdown  2.  Assess vascular access sites hourly  3.  Every 4-6 hours minimum:  Change oxygen saturation probe site  4.  Every 4-6 hours:  If on nasal continuous positive airway pressure, respiratory therapy assess nares and determine need for appliance change or resting period  Outcome: Progressing  Flowsheets  Taken 7/6/2025 1600  Skin Integrity Remains Intact:   Monitor for areas of redness and/or skin breakdown

## 2025-07-07 LAB
ALBUMIN SERPL-MCNC: 2.5 G/DL (ref 3.4–5)
ANION GAP SERPL CALCULATED.3IONS-SCNC: 11 MMOL/L (ref 3–16)
BASOPHILS # BLD: 0.1 K/UL (ref 0–0.2)
BASOPHILS NFR BLD: 0.4 %
BUN SERPL-MCNC: 3 MG/DL (ref 7–20)
CALCIUM SERPL-MCNC: 8.2 MG/DL (ref 8.3–10.6)
CHLORIDE SERPL-SCNC: 102 MMOL/L (ref 99–110)
CO2 SERPL-SCNC: 22 MMOL/L (ref 21–32)
CREAT SERPL-MCNC: 0.5 MG/DL (ref 0.9–1.3)
DEPRECATED RDW RBC AUTO: 18.2 % (ref 12.4–15.4)
EOSINOPHIL # BLD: 0.2 K/UL (ref 0–0.6)
EOSINOPHIL NFR BLD: 1.1 %
GFR SERPLBLD CREATININE-BSD FMLA CKD-EPI: >90 ML/MIN/{1.73_M2}
GLUCOSE SERPL-MCNC: 105 MG/DL (ref 70–99)
HCT VFR BLD AUTO: 27 % (ref 40.5–52.5)
HGB BLD-MCNC: 8.4 G/DL (ref 13.5–17.5)
LYMPHOCYTES # BLD: 2.3 K/UL (ref 1–5.1)
LYMPHOCYTES NFR BLD: 14 %
MAGNESIUM SERPL-MCNC: 1.36 MG/DL (ref 1.8–2.4)
MCH RBC QN AUTO: 24.6 PG (ref 26–34)
MCHC RBC AUTO-ENTMCNC: 31.2 G/DL (ref 31–36)
MCV RBC AUTO: 78.8 FL (ref 80–100)
MONOCYTES # BLD: 0.7 K/UL (ref 0–1.3)
MONOCYTES NFR BLD: 4.7 %
NEUTROPHILS # BLD: 12.8 K/UL (ref 1.7–7.7)
NEUTROPHILS NFR BLD: 79.8 %
PHOSPHATE SERPL-MCNC: 2.1 MG/DL (ref 2.5–4.9)
PLATELET # BLD AUTO: 449 K/UL (ref 135–450)
PMV BLD AUTO: 7.3 FL (ref 5–10.5)
POTASSIUM SERPL-SCNC: 4.6 MMOL/L (ref 3.5–5.1)
RBC # BLD AUTO: 3.43 M/UL (ref 4.2–5.9)
SODIUM SERPL-SCNC: 135 MMOL/L (ref 136–145)
VANCOMYCIN SERPL-MCNC: 15.9 UG/ML
WBC # BLD AUTO: 16.1 K/UL (ref 4–11)

## 2025-07-07 PROCEDURE — 6360000002 HC RX W HCPCS: Performed by: INTERNAL MEDICINE

## 2025-07-07 PROCEDURE — 85025 COMPLETE CBC W/AUTO DIFF WBC: CPT

## 2025-07-07 PROCEDURE — 2580000003 HC RX 258

## 2025-07-07 PROCEDURE — 6370000000 HC RX 637 (ALT 250 FOR IP): Performed by: NURSE PRACTITIONER

## 2025-07-07 PROCEDURE — 80202 ASSAY OF VANCOMYCIN: CPT

## 2025-07-07 PROCEDURE — 6360000002 HC RX W HCPCS: Performed by: NURSE PRACTITIONER

## 2025-07-07 PROCEDURE — 2060000000 HC ICU INTERMEDIATE R&B

## 2025-07-07 PROCEDURE — 2580000003 HC RX 258: Performed by: INTERNAL MEDICINE

## 2025-07-07 PROCEDURE — 36415 COLL VENOUS BLD VENIPUNCTURE: CPT

## 2025-07-07 PROCEDURE — 99233 SBSQ HOSP IP/OBS HIGH 50: CPT | Performed by: INTERNAL MEDICINE

## 2025-07-07 PROCEDURE — 2500000003 HC RX 250 WO HCPCS

## 2025-07-07 PROCEDURE — 83735 ASSAY OF MAGNESIUM: CPT

## 2025-07-07 PROCEDURE — 80069 RENAL FUNCTION PANEL: CPT

## 2025-07-07 PROCEDURE — 2500000003 HC RX 250 WO HCPCS: Performed by: INTERNAL MEDICINE

## 2025-07-07 PROCEDURE — 99232 SBSQ HOSP IP/OBS MODERATE 35: CPT | Performed by: SURGERY

## 2025-07-07 PROCEDURE — 2580000003 HC RX 258: Performed by: NURSE PRACTITIONER

## 2025-07-07 RX ORDER — POTASSIUM CHLORIDE 1500 MG/1
40 TABLET, EXTENDED RELEASE ORAL DAILY
Status: DISCONTINUED | OUTPATIENT
Start: 2025-07-07 | End: 2025-07-14

## 2025-07-07 RX ORDER — LANOLIN ALCOHOL/MO/W.PET/CERES
400 CREAM (GRAM) TOPICAL 2 TIMES DAILY
Status: DISCONTINUED | OUTPATIENT
Start: 2025-07-07 | End: 2025-07-15 | Stop reason: HOSPADM

## 2025-07-07 RX ADMIN — PIPERACILLIN AND TAZOBACTAM 3375 MG: 3; .375 INJECTION, POWDER, LYOPHILIZED, FOR SOLUTION INTRAVENOUS at 11:15

## 2025-07-07 RX ADMIN — PREGABALIN 100 MG: 50 CAPSULE ORAL at 16:45

## 2025-07-07 RX ADMIN — VANCOMYCIN HYDROCHLORIDE 1000 MG: 1 INJECTION, POWDER, LYOPHILIZED, FOR SOLUTION INTRAVENOUS at 00:21

## 2025-07-07 RX ADMIN — PREGABALIN 100 MG: 50 CAPSULE ORAL at 21:33

## 2025-07-07 RX ADMIN — PREGABALIN 100 MG: 50 CAPSULE ORAL at 08:42

## 2025-07-07 RX ADMIN — ENOXAPARIN SODIUM 40 MG: 100 INJECTION SUBCUTANEOUS at 08:41

## 2025-07-07 RX ADMIN — DICYCLOMINE HYDROCHLORIDE 10 MG: 10 CAPSULE ORAL at 21:33

## 2025-07-07 RX ADMIN — Medication 400 MG: at 08:42

## 2025-07-07 RX ADMIN — Medication 400 MG: at 21:34

## 2025-07-07 RX ADMIN — DICYCLOMINE HYDROCHLORIDE 10 MG: 10 CAPSULE ORAL at 11:11

## 2025-07-07 RX ADMIN — PIPERACILLIN AND TAZOBACTAM 3375 MG: 3; .375 INJECTION, POWDER, LYOPHILIZED, FOR SOLUTION INTRAVENOUS at 19:03

## 2025-07-07 RX ADMIN — DICYCLOMINE HYDROCHLORIDE 10 MG: 10 CAPSULE ORAL at 16:45

## 2025-07-07 RX ADMIN — SODIUM PHOSPHATE, MONOBASIC, MONOHYDRATE AND SODIUM PHOSPHATE, DIBASIC, ANHYDROUS 10 MMOL: 142; 276 INJECTION, SOLUTION INTRAVENOUS at 21:01

## 2025-07-07 RX ADMIN — PANTOPRAZOLE SODIUM 40 MG: 40 TABLET, DELAYED RELEASE ORAL at 06:34

## 2025-07-07 RX ADMIN — MIRTAZAPINE 15 MG: 15 TABLET, FILM COATED ORAL at 21:34

## 2025-07-07 RX ADMIN — DULOXETINE 30 MG: 30 CAPSULE, DELAYED RELEASE ORAL at 08:41

## 2025-07-07 RX ADMIN — METRONIDAZOLE 500 MG: 500 TABLET ORAL at 23:36

## 2025-07-07 RX ADMIN — VANCOMYCIN HYDROCHLORIDE 1000 MG: 1 INJECTION, POWDER, LYOPHILIZED, FOR SOLUTION INTRAVENOUS at 08:51

## 2025-07-07 RX ADMIN — METOPROLOL SUCCINATE 100 MG: 100 TABLET, EXTENDED RELEASE ORAL at 08:42

## 2025-07-07 RX ADMIN — POTASSIUM CHLORIDE 40 MEQ: 1500 TABLET, EXTENDED RELEASE ORAL at 08:41

## 2025-07-07 RX ADMIN — SODIUM CHLORIDE, PRESERVATIVE FREE 10 ML: 5 INJECTION INTRAVENOUS at 08:41

## 2025-07-07 RX ADMIN — PIPERACILLIN AND TAZOBACTAM 3375 MG: 3; .375 INJECTION, POWDER, LYOPHILIZED, FOR SOLUTION INTRAVENOUS at 04:31

## 2025-07-07 RX ADMIN — DICYCLOMINE HYDROCHLORIDE 10 MG: 10 CAPSULE ORAL at 06:34

## 2025-07-07 ASSESSMENT — PAIN - FUNCTIONAL ASSESSMENT: PAIN_FUNCTIONAL_ASSESSMENT: PREVENTS OR INTERFERES SOME ACTIVE ACTIVITIES AND ADLS

## 2025-07-07 ASSESSMENT — PAIN DESCRIPTION - DESCRIPTORS
DESCRIPTORS: ACHING
DESCRIPTORS: ACHING

## 2025-07-07 ASSESSMENT — PAIN DESCRIPTION - LOCATION
LOCATION: BACK
LOCATION: RECTUM

## 2025-07-07 ASSESSMENT — PAIN SCALES - GENERAL
PAINLEVEL_OUTOF10: 10
PAINLEVEL_OUTOF10: 4

## 2025-07-07 ASSESSMENT — PAIN DESCRIPTION - FREQUENCY: FREQUENCY: CONTINUOUS

## 2025-07-07 ASSESSMENT — PAIN DESCRIPTION - ORIENTATION
ORIENTATION: MID
ORIENTATION: LOWER

## 2025-07-07 ASSESSMENT — PAIN DESCRIPTION - ONSET: ONSET: ON-GOING

## 2025-07-07 ASSESSMENT — PAIN DESCRIPTION - PAIN TYPE: TYPE: ACUTE PAIN

## 2025-07-07 NOTE — PLAN OF CARE
Problem: Pain  Goal: Verbalizes/displays adequate comfort level or baseline comfort level  7/7/2025 0943 by Guicho Bronson, RN  Outcome: Progressing  Note: Patient reports mild lower back pain this AM. Patient offered PRN oxycodone but declined.     Problem: Discharge Planning  Goal: Discharge to home or other facility with appropriate resources  7/7/2025 0943 by Guicho Bronson, RN  Outcome: Progressing  Note: Pending evaluations from physical and occupational therapy.

## 2025-07-07 NOTE — CARE COORDINATION
DISCHARGE PLANNING:  Chart reviewed.  Patient is originally from home with his aunt, uncle, cousin and brother. He has been at [facility] as a skilled resident since April with multiple send outs.    Current discharge plan is TBD.  I spoke with Darwin at [facility] today ([phone]) who stated her  spoke with patients Aunt and explained they cannot take patient back as he refuses all care. They have been by to  his belongings since that discussion.  I called patients Aunt Dimitri who stated [facility] never told her that patient couldn't return but she did go and  his staff as she was afraid they would lose it.  I informed her that [facility] is not agreeable to taking patient back unless he is under hospice care (which they are not ready for).    Quynh states patient is originally from home, and she's okay with him returning home if he's able to get up and care for himself; as she has a \"special needs\" daughter and \"handicapped\" nephew that she cares for.   Before patient went to the hospital at the beginning of the year, he was able to ambulate independently (stayed in the basement) and has been changing his own colostomy for the last 3-4 years. She has no DME for him at home.    We discussed SNF placement and she was agreeable to me sending referrals to the followin) [facility] -- sent via [facility]  2) [facility] -- sent via [facility]  3) [facility] (stated they would take before he was in [facility]) -- sent via [facility]    4) [facility] -- has been there before and she doesn't want him to go back  5) [facility] -- refusing to take back  6) [facility] -- does not want him going there    I explained to Quynh that patient has been refusing certain exams/care and she believes it's a \"pride thing\". She requested that staff call her while they are in the room so she can help him understand the

## 2025-07-07 NOTE — PLAN OF CARE
Problem: Discharge Planning  Goal: Discharge to home or other facility with appropriate resources  7/7/2025 0217 by Harsh Bell RN  Outcome: Progressing  Flowsheets (Taken 7/7/2025 0217)  Discharge to home or other facility with appropriate resources:   Identify discharge learning needs (meds, wound care, etc)   Arrange for needed discharge resources and transportation as appropriate   Identify barriers to discharge with patient and caregiver     Problem: Pain  Goal: Verbalizes/displays adequate comfort level or baseline comfort level  7/7/2025 0217 by Harsh Bell RN  Outcome: Progressing  Flowsheets (Taken 7/7/2025 0217)  Verbalizes/displays adequate comfort level or baseline comfort level:   Encourage patient to monitor pain and request assistance   Assess pain using appropriate pain scale   Implement non-pharmacological measures as appropriate and evaluate response   Consider cultural and social influences on pain and pain management   Notify Licensed Independent Practitioner if interventions unsuccessful or patient reports new pain  Note: Pt reported coccyx pain, 10/10 RN offered pain meds and turning to relieve pressure. PT refused.      Problem: Nutrition Deficit:  Goal: Optimize nutritional status  7/7/2025 0217 by Harsh Bell RN  Outcome: Progressing  Flowsheets (Taken 7/7/2025 0217)  Nutrient intake appropriate for improving, restoring, or maintaining nutritional needs: Assess nutritional status and recommend course of action     Problem: Skin/Tissue Integrity  Goal: Skin integrity remains intact  Description: 1.  Monitor for areas of redness and/or skin breakdown  2.  Assess vascular access sites hourly  3.  Every 4-6 hours minimum:  Change oxygen saturation probe site  4.  Every 4-6 hours:  If on nasal continuous positive airway pressure, respiratory therapy assess nares and determine need for appliance change or resting period  7/7/2025 0217 by Harsh Bell, RN  Outcome:

## 2025-07-08 PROBLEM — F33.2 MDD (MAJOR DEPRESSIVE DISORDER), RECURRENT SEVERE, WITHOUT PSYCHOSIS (HCC): Status: ACTIVE | Noted: 2025-07-08

## 2025-07-08 PROBLEM — F41.9 ANXIETY: Status: ACTIVE | Noted: 2025-07-08

## 2025-07-08 LAB
ALBUMIN SERPL-MCNC: 2.4 G/DL (ref 3.4–5)
ANION GAP SERPL CALCULATED.3IONS-SCNC: 11 MMOL/L (ref 3–16)
BASOPHILS # BLD: 0 K/UL (ref 0–0.2)
BASOPHILS NFR BLD: 0.3 %
BUN SERPL-MCNC: 5 MG/DL (ref 7–20)
CALCIUM SERPL-MCNC: 8 MG/DL (ref 8.3–10.6)
CHLORIDE SERPL-SCNC: 103 MMOL/L (ref 99–110)
CO2 SERPL-SCNC: 22 MMOL/L (ref 21–32)
CREAT SERPL-MCNC: 0.5 MG/DL (ref 0.9–1.3)
DEPRECATED RDW RBC AUTO: 18.5 % (ref 12.4–15.4)
EOSINOPHIL # BLD: 0.1 K/UL (ref 0–0.6)
EOSINOPHIL NFR BLD: 1.2 %
GFR SERPLBLD CREATININE-BSD FMLA CKD-EPI: >90 ML/MIN/{1.73_M2}
GLUCOSE SERPL-MCNC: 151 MG/DL (ref 70–99)
HCT VFR BLD AUTO: 24.7 % (ref 40.5–52.5)
HGB BLD-MCNC: 7.5 G/DL (ref 13.5–17.5)
LYMPHOCYTES # BLD: 1.6 K/UL (ref 1–5.1)
LYMPHOCYTES NFR BLD: 12.5 %
MAGNESIUM SERPL-MCNC: 1.13 MG/DL (ref 1.8–2.4)
MCH RBC QN AUTO: 23.9 PG (ref 26–34)
MCHC RBC AUTO-ENTMCNC: 30.2 G/DL (ref 31–36)
MCV RBC AUTO: 79 FL (ref 80–100)
MONOCYTES # BLD: 0.3 K/UL (ref 0–1.3)
MONOCYTES NFR BLD: 2.5 %
NEUTROPHILS # BLD: 10.5 K/UL (ref 1.7–7.7)
NEUTROPHILS NFR BLD: 83.5 %
PHOSPHATE SERPL-MCNC: 2.9 MG/DL (ref 2.5–4.9)
PLATELET # BLD AUTO: 432 K/UL (ref 135–450)
PMV BLD AUTO: 6.7 FL (ref 5–10.5)
POTASSIUM SERPL-SCNC: 3.5 MMOL/L (ref 3.5–5.1)
RBC # BLD AUTO: 3.12 M/UL (ref 4.2–5.9)
SODIUM SERPL-SCNC: 136 MMOL/L (ref 136–145)
WBC # BLD AUTO: 12.6 K/UL (ref 4–11)

## 2025-07-08 PROCEDURE — 2580000003 HC RX 258: Performed by: INTERNAL MEDICINE

## 2025-07-08 PROCEDURE — 6360000002 HC RX W HCPCS: Performed by: INTERNAL MEDICINE

## 2025-07-08 PROCEDURE — 97530 THERAPEUTIC ACTIVITIES: CPT

## 2025-07-08 PROCEDURE — 2580000003 HC RX 258: Performed by: NURSE PRACTITIONER

## 2025-07-08 PROCEDURE — 97162 PT EVAL MOD COMPLEX 30 MIN: CPT

## 2025-07-08 PROCEDURE — 6360000002 HC RX W HCPCS: Performed by: NURSE PRACTITIONER

## 2025-07-08 PROCEDURE — 99231 SBSQ HOSP IP/OBS SF/LOW 25: CPT | Performed by: SURGERY

## 2025-07-08 PROCEDURE — 83735 ASSAY OF MAGNESIUM: CPT

## 2025-07-08 PROCEDURE — 97166 OT EVAL MOD COMPLEX 45 MIN: CPT

## 2025-07-08 PROCEDURE — 6370000000 HC RX 637 (ALT 250 FOR IP): Performed by: NURSE PRACTITIONER

## 2025-07-08 PROCEDURE — 36415 COLL VENOUS BLD VENIPUNCTURE: CPT

## 2025-07-08 PROCEDURE — 85025 COMPLETE CBC W/AUTO DIFF WBC: CPT

## 2025-07-08 PROCEDURE — 6370000000 HC RX 637 (ALT 250 FOR IP): Performed by: FAMILY MEDICINE

## 2025-07-08 PROCEDURE — 2060000000 HC ICU INTERMEDIATE R&B

## 2025-07-08 PROCEDURE — 80069 RENAL FUNCTION PANEL: CPT

## 2025-07-08 PROCEDURE — 6370000000 HC RX 637 (ALT 250 FOR IP): Performed by: INTERNAL MEDICINE

## 2025-07-08 RX ORDER — OXYCODONE HYDROCHLORIDE 5 MG/1
5 TABLET ORAL ONCE
Refills: 0 | Status: COMPLETED | OUTPATIENT
Start: 2025-07-08 | End: 2025-07-08

## 2025-07-08 RX ORDER — MORPHINE SULFATE 2 MG/ML
2 INJECTION, SOLUTION INTRAMUSCULAR; INTRAVENOUS
Status: DISCONTINUED | OUTPATIENT
Start: 2025-07-08 | End: 2025-07-09

## 2025-07-08 RX ADMIN — PIPERACILLIN AND TAZOBACTAM 3375 MG: 3; .375 INJECTION, POWDER, LYOPHILIZED, FOR SOLUTION INTRAVENOUS at 11:18

## 2025-07-08 RX ADMIN — SODIUM CHLORIDE 1250 MG: 0.9 INJECTION, SOLUTION INTRAVENOUS at 00:09

## 2025-07-08 RX ADMIN — MAGNESIUM SULFATE HEPTAHYDRATE 2000 MG: 40 INJECTION, SOLUTION INTRAVENOUS at 21:02

## 2025-07-08 RX ADMIN — MAGNESIUM SULFATE HEPTAHYDRATE 2000 MG: 40 INJECTION, SOLUTION INTRAVENOUS at 17:58

## 2025-07-08 RX ADMIN — Medication 400 MG: at 08:30

## 2025-07-08 RX ADMIN — PIPERACILLIN AND TAZOBACTAM 3375 MG: 3; .375 INJECTION, POWDER, LYOPHILIZED, FOR SOLUTION INTRAVENOUS at 04:01

## 2025-07-08 RX ADMIN — PREGABALIN 100 MG: 50 CAPSULE ORAL at 08:29

## 2025-07-08 RX ADMIN — OXYCODONE 5 MG: 5 TABLET ORAL at 15:38

## 2025-07-08 RX ADMIN — PIPERACILLIN AND TAZOBACTAM 3375 MG: 3; .375 INJECTION, POWDER, LYOPHILIZED, FOR SOLUTION INTRAVENOUS at 17:55

## 2025-07-08 RX ADMIN — PREGABALIN 100 MG: 50 CAPSULE ORAL at 15:19

## 2025-07-08 RX ADMIN — DICYCLOMINE HYDROCHLORIDE 10 MG: 10 CAPSULE ORAL at 11:19

## 2025-07-08 RX ADMIN — POTASSIUM CHLORIDE 40 MEQ: 1500 TABLET, EXTENDED RELEASE ORAL at 08:29

## 2025-07-08 RX ADMIN — MIRTAZAPINE 15 MG: 15 TABLET, FILM COATED ORAL at 22:38

## 2025-07-08 RX ADMIN — OXYCODONE 5 MG: 5 TABLET ORAL at 21:03

## 2025-07-08 RX ADMIN — DICYCLOMINE HYDROCHLORIDE 10 MG: 10 CAPSULE ORAL at 17:54

## 2025-07-08 RX ADMIN — SODIUM CHLORIDE 1250 MG: 0.9 INJECTION, SOLUTION INTRAVENOUS at 09:07

## 2025-07-08 RX ADMIN — METOPROLOL SUCCINATE 100 MG: 100 TABLET, EXTENDED RELEASE ORAL at 08:29

## 2025-07-08 RX ADMIN — MORPHINE SULFATE 2 MG: 2 INJECTION, SOLUTION INTRAMUSCULAR; INTRAVENOUS at 23:51

## 2025-07-08 RX ADMIN — OXYCODONE 5 MG: 5 TABLET ORAL at 00:04

## 2025-07-08 RX ADMIN — Medication 400 MG: at 22:38

## 2025-07-08 RX ADMIN — PREGABALIN 100 MG: 50 CAPSULE ORAL at 22:38

## 2025-07-08 RX ADMIN — DICYCLOMINE HYDROCHLORIDE 10 MG: 10 CAPSULE ORAL at 22:38

## 2025-07-08 RX ADMIN — SODIUM CHLORIDE 1250 MG: 0.9 INJECTION, SOLUTION INTRAVENOUS at 23:16

## 2025-07-08 RX ADMIN — DICYCLOMINE HYDROCHLORIDE 10 MG: 10 CAPSULE ORAL at 06:07

## 2025-07-08 RX ADMIN — PANTOPRAZOLE SODIUM 40 MG: 40 TABLET, DELAYED RELEASE ORAL at 06:08

## 2025-07-08 RX ADMIN — DULOXETINE 30 MG: 30 CAPSULE, DELAYED RELEASE ORAL at 08:30

## 2025-07-08 ASSESSMENT — PAIN SCALES - GENERAL
PAINLEVEL_OUTOF10: 10
PAINLEVEL_OUTOF10: 0
PAINLEVEL_OUTOF10: 10
PAINLEVEL_OUTOF10: 0
PAINLEVEL_OUTOF10: 2
PAINLEVEL_OUTOF10: 10

## 2025-07-08 ASSESSMENT — PAIN DESCRIPTION - FREQUENCY
FREQUENCY: CONTINUOUS

## 2025-07-08 ASSESSMENT — PAIN DESCRIPTION - PAIN TYPE
TYPE: CHRONIC PAIN

## 2025-07-08 ASSESSMENT — PAIN DESCRIPTION - LOCATION
LOCATION: RECTUM

## 2025-07-08 ASSESSMENT — PAIN DESCRIPTION - ONSET
ONSET: GRADUAL

## 2025-07-08 ASSESSMENT — PAIN DESCRIPTION - ORIENTATION
ORIENTATION: POSTERIOR
ORIENTATION: POSTERIOR
ORIENTATION: MID
ORIENTATION: POSTERIOR

## 2025-07-08 ASSESSMENT — PAIN - FUNCTIONAL ASSESSMENT
PAIN_FUNCTIONAL_ASSESSMENT: PREVENTS OR INTERFERES SOME ACTIVE ACTIVITIES AND ADLS

## 2025-07-08 ASSESSMENT — PAIN DESCRIPTION - DESCRIPTORS
DESCRIPTORS: ACHING

## 2025-07-08 NOTE — CARE COORDINATION
Discharge Planning:    CM spoke with pt at bedside regarding DCP- only accepting facility, thus far, for placement at [place] is [facility]. D/w pt- states it \"may be worth it to check out\". Pt will require precert prior to admission- when CM informed pt that we can start precert today, he declined this stating that he just got to the hospital. CM explained precert process and how it can take some time before insurance gets back with us regarding admission- pt wishes for CM to discuss with his aunt. CM attempted to call aunt, Quynh- unable to reach or leave .    CM d/w NP, Shara, and PC NP, Violette - CM found POA documentation in a merged chart, naming Quynh as POA. Uploaded to EMR under ACP docs. CM attempted to call POA again- HIPAA compliant  left requesting call back to discuss DCP. CM will continue to follow and update with any further accepting facilites.    1330:  CM received call back from POA- expressed frustrations with the limited options of facilities for placement at [place]. CM informed her that [facility] is able to accept- provided her with address of facility and encouraged her to tour it sooner rather than later, as we will need to get a potentially long precert back before he can admit to facility. POA verbalized understanding. CM will f/u if any further facilities state they can accept pt.      Thank you  Cat Eriberto RN, BSN,   ICU   [phone]

## 2025-07-08 NOTE — PLAN OF CARE
Problem: Pain  Goal: Verbalizes/displays adequate comfort level or baseline comfort level  Outcome: Progressing  Flowsheets (Taken 7/8/2025 0125)  Verbalizes/displays adequate comfort level or baseline comfort level:   Encourage patient to monitor pain and request assistance   Administer analgesics based on type and severity of pain and evaluate response   Assess pain using appropriate pain scale   Implement non-pharmacological measures as appropriate and evaluate response   Consider cultural and social influences on pain and pain management   Notify Licensed Independent Practitioner if interventions unsuccessful or patient reports new pain  Note: Reported rectal pain, prn pain meds given      Problem: Discharge Planning  Goal: Discharge to home or other facility with appropriate resources  Outcome: Progressing  Flowsheets (Taken 7/8/2025 0125)  Discharge to home or other facility with appropriate resources:   Identify barriers to discharge with patient and caregiver   Identify discharge learning needs (meds, wound care, etc)   Arrange for needed discharge resources and transportation as appropriate     Problem: Pain  Goal: Verbalizes/displays adequate comfort level or baseline comfort level  Outcome: Progressing  Flowsheets (Taken 7/8/2025 0125)  Verbalizes/displays adequate comfort level or baseline comfort level:   Encourage patient to monitor pain and request assistance   Administer analgesics based on type and severity of pain and evaluate response   Assess pain using appropriate pain scale   Implement non-pharmacological measures as appropriate and evaluate response   Consider cultural and social influences on pain and pain management   Notify Licensed Independent Practitioner if interventions unsuccessful or patient reports new pain  Note: Reported rectal pain, prn pain meds given      Problem: Nutrition Deficit:  Goal: Optimize nutritional status  Outcome: Progressing  Flowsheets (Taken 7/8/2025

## 2025-07-08 NOTE — PLAN OF CARE
Problem: Discharge Planning  Goal: Discharge to home or other facility with appropriate resources  Outcome: Progressing  Flowsheets (Taken 7/8/2025 1900)  Discharge to home or other facility with appropriate resources: Identify barriers to discharge with patient and caregiver     Problem: Pain  Goal: Verbalizes/displays adequate comfort level or baseline comfort level  Outcome: Progressing  Flowsheets (Taken 7/8/2025 1900)  Verbalizes/displays adequate comfort level or baseline comfort level:   Encourage patient to monitor pain and request assistance   Administer analgesics based on type and severity of pain and evaluate response   Consider cultural and social influences on pain and pain management   Assess pain using appropriate pain scale   Implement non-pharmacological measures as appropriate and evaluate response   Notify Licensed Independent Practitioner if interventions unsuccessful or patient reports new pain     Problem: Safety - Adult  Goal: Free from fall injury  Outcome: Progressing  Flowsheets (Taken 7/8/2025 1900)  Free From Fall Injury: Instruct family/caregiver on patient safety

## 2025-07-08 NOTE — CONSULTS
Consultation Note    Patient Name: Barbra Cortés  : 2000  Age: 25 y.o.     Admitting Physician: Aisha Leone MD   Date of Admission: 2025  3:59 PM   Primary Care Physician: Jade Lucia MD        Barbra Cortés is being seen at the request of Aisha Leone MD for management pf Crohn's disease.    History of Present Illness:  25-year-old male with history of multiple comorbidities to include fistulizing and metastatic cutaneous Crohn's disease status post loop diverting ileostomy and total proctocolectomy since 2023, history of TPN use, sacral decubitus ulcerations,  SBO secondary to ileostomy twisting status post lap with ileostomy revision performed on 2025, recent EUA with seton removal at [facility] on 2025 who presented from nursing home on  for complaints of abdominal pain and nausea and poor appetite.  Concerns for SBO on CT scan on admission with possible closed-loop near the ileostomy.  However repeat CT on  without evidence of obstruction.  General surgery on board, no surgical intervention at this time.  Patient was also found to have stage IV sacral ulcer and is currently on Vanco and Zosyn.      Today, patient reports improvement of abdominal pain, denies any nausea or vomiting, diarrhea or fever.  He refused abdominal exam and is unable to provide any history regarding his Crohn's disease.  He does not recall if he takes any medication for it.    GI History:  Patient was diagnosed with Crohn's disease in .  Patient follows with UC and as per chart review has tried multiple regimens. Patient also shows poor medication compliance. He was initially on infliximab from  to . In  he had a hospital visit where he was not on any current regimen for his Crohn's and was started on Entyvio with prednisone taper in .  Last GI office note on 3/14/2024 showed patient was again not on any current regimen.  Decision was to restart infliximab with methotrexate 
Clinical Pharmacy Consult Note - Pain Regimen Recommendations    Admit Date: 7/1/2025   Consulting Provider: BOUCHRA Lizarraga    Pharmacy has been consulted to make pain medication recommendations by BOUCHRA Lizarraga / assess for potential trial of TD fentanyl patch.    Subjective/Objective: Pt is a 25 yoM with a PMHx that includes Crohn's disease, chronic pain, adjustment disorder with mixed anxiety and depressed mood, non-adherence to medical treatment, pericarditis, IBD, and anemia who is admitted with abdominal pain from his nursing facility ([facility]).    Of note, pt was recently admitted to OSH ([facility]) from 6/1/25 to 6/16/25. During recent OSH admission, the following is noted regarding patient's chronic pain:   \"Pt has chronic pain 2/2 abdominal procedures and non healing wounds in his perianal region. Has been on/off high doses of opioids in the past during hospitalizations and is endorsing pain that is severe and only improves with IV Dilaudid. Multiple PO medications available, but patient has generally refused these medications. Did trial several on 5/3 and was able to keep them down.  - On scheduled IV zofran at mealtimes. Attempt PO medications ~30 minutes after zofran  - Can use PRN compazine for breakthrough nausea  -WILL NOT transition pain medications to IV. Patient has PO dilaudid available but needs to try at least one non-opiate pain medication before receiving dilaudid. Would only transition to IV if there is an acute change.  - Continue scheduled tylenol, cymbalta, robaxin, gabapentin, and bentyl  - Patient not interested in buprenorphine\"     Daily update:  7/6: Per RN report this afternoon, patient is requesting parenteral pain medication only and is refusing additional line placement for imaging required. Per attending surgeon MD note in chart at this time (Dr. Nur), there is low suspicion for SBO currently and patient has regular diet ordered (able to take PO 
Clinical Pharmacy Consult Note - Pain Regimen Recommendations    Admit Date: 7/1/2025   Consulting Provider: BOUCHRA Lizarraga    Pharmacy has been consulted to make pain medication recommendations by BOUCHRA Lizarraga / assess for potential trial of TD fentanyl patch.    Subjective/Objective: Pt is a 25 yoM with a PMHx that includes Crohn's disease, chronic pain, adjustment disorder with mixed anxiety and depressed mood, non-adherence to medical treatment, pericarditis, IBD, and anemia who is admitted with abdominal pain from his nursing facility ([facility]).    Of note, pt was recently admitted to OSH ([facility]) from 6/1/25 to 6/16/25. During recent OSH admission, the following is noted regarding patient's chronic pain:   \"Pt has chronic pain 2/2 abdominal procedures and non healing wounds in his perianal region. Has been on/off high doses of opioids in the past during hospitalizations and is endorsing pain that is severe and only improves with IV Dilaudid. Multiple PO medications available, but patient has generally refused these medications. Did trial several on 5/3 and was able to keep them down.  - On scheduled IV zofran at mealtimes. Attempt PO medications ~30 minutes after zofran  - Can use PRN compazine for breakthrough nausea  -WILL NOT transition pain medications to IV. Patient has PO dilaudid available but needs to try at least one non-opiate pain medication before receiving dilaudid. Would only transition to IV if there is an acute change.  - Continue scheduled tylenol, cymbalta, robaxin, gabapentin, and bentyl  - Patient not interested in buprenorphine\"     Daily update:  7/6: Per RN report this afternoon, patient is requesting parenteral pain medication only and is refusing additional line placement for imaging required. Per attending surgeon MD note in chart at this time (Dr. Nur), there is low suspicion for SBO currently and patient has regular diet ordered (able to take PO 
Infectious Diseases   Consult Note      Reason for Consult: Leukocytosis, abdominal pain  Requesting Physician: Dr. Leone  Date of Admission: 7/1/2025  Subjective:   CHIEF COMPLAINT: Abdominal pain    HPI:  25-year-old male with history of multiple comorbidities to include fistulizing Crohn's disease with history of recurrent pelvic and perianal abscesses status post loop diverting ileostomy and total proctocolectomy since 11/2023, history of TPN use and previous candidemia (2024), sacral decubitus ulcerations, hidradenitis suppurativa of perineum, SBO secondary to ileostomy twisting status post lap with ileostomy revision performed on 4/2025, recent EUA with seton removal at [facility] on 5/2025 who presented on 7/1 for complaints of abdominal pain and nausea.  Upon presentation, WBC was 22.1 and he was afebrile.  He was empirically started on vancomycin and pip-tazo.  Blood cultures x 2 with 1 set taken from 7/1 is now showing growth of staph epi.  He CT abdomen and pelvis performed on 7/1 showed focally dilated loop of small bowel with transition point of the small bowel near its entry to ostomy.  Proximal and dilated loop of small bowel is also decompressed near the more distal transition point.  General surgery has been following for concerns of SBO.  Planning to get repeat CT scan.  He is refusing some care as well as transfer to [facility] where majority of his surgeries and care has been.  He currently states his abdominal pain is a bit worse, states primarily in the stomach region.  He states tolerating p.o. intake without worsening abdominal pain or nausea.  He states his drainage has been about the same.  He is currently refusing exam of his abdomen or perineum region                     Current abx: Vancomycin, pip-tazo         Past Surgical History:   History reviewed. No pertinent past medical history.  History reviewed. No pertinent surgical history.    Social History:    TOBACCO:   reports that he has never 
Received request for Telepsychiatry evaluation.  Teladoc in use with another patient.    
[facility] -  Clinical Pharmacy Note    Vancomycin - Management by Pharmacy    Consult Date(s): 7/2/25  Consulting Provider(s): BOUCHRA Cassidy    Assessment / Plan   Sepsis  - stage 4 pressure ulcer on sacrum/coccyx - Vancomycin  Concurrent Antimicrobials: Zosyn  Day of Vanc Therapy / Ordered Duration: 2 of 7  Current Dosing Method: Bayesian-Guided AUC Dosing  Therapeutic Goal: -600 mg/L*hr  Current Dose / Plan:   Renal function: ROCÍO - SCr 2.5 on admit, improved to 1.8 today  Baseline SCr ~0.5 from June 2025  Received loading dose last night of vancomycin 1500mg IV x1  Level today = 37.7 mg/L - drawn ~10h after prior dose  Continue vancomycin 750mg IV q24h.   Kinetics predict an AUC = 519 mg/L*h with an estimated steady-state vancomycin trough = 11.6 mcg/mL  Suspect dose will need increased as SCr improves  No vancomycin follow up levels have been ordered at this time.   Will continue to monitor clinical condition and make adjustments to regimen as appropriate.    Thank you for consulting pharmacy,    Please call with questions--  Kalli Qureshi, PharmD, BCPS  Wireless: [phone]   7/2/2025 12:20 PM        Interval update:  therapy initiation     Subjective/Objective:   Barbra Cortés is a 25 y.o. male with a PMHx significant for adjustment disorder, colonic and perineal Crohn's s/p lap TAC with end ileostomy, chronic abdominal pain, sacral wounds who presented with 3day history of abdominal pain. 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    Pharmacy is consulted to dose Vancomycin.    Ht Readings from Last 1 Encounters:   07/01/25 1.93 m (6' 4\")     Wt Readings from Last 1 Encounters:   07/01/25 55.1 kg (121 lb 8 oz)     Current & Prior Antimicrobial Regimen(s):  Zosyn (7/1-current)  Vancomycin - Pharmacy to dose  1500mg IV x1 7/1  750mg IV q24h (7/2-current)    Vancomycin Level(s) / Doses:    Date Time Dose Type of Level / Level 
point. Findings may reflect a closed loop obstruction. Water-soluble contrast ileostomy enema may be helpful to to further characterize.   2. Cholelithiasis without CT evidence of cholecystitis.             Electronically signed by Brody Rain MD            Assessment/Plan:  Barbra Cortés is a 25 y.o. male with Hx of adjustment disorder, colonic and perianal Crohn's s/p lap TAC with end ileostomy (11/2023, [facility]) c/b SBO 2/2 ileostomy twisting s/p lap w/ ileostomy revision (4/6/2025 [facility]) and chronic abdominal pain several non-healing wounds, sacral ulceration, and perianal crohns complications (Recent EUA, seton removal [facility]. 5/22/25)  who presents with abdominal pain and CT findings concerning for SBO with possible closed loop near the ileostomy. Discussed with radiologist on-call: there is no bowel wall thickening, free fluid, or signs of ischemia.     Red rubber catheter inserted into stoma without difficulty with return of soft brown stool.     No acute surgical intervention indicated at this time   Recommend transfer to [facility] for continuity of care   Aggressive Fluid resuscitation  Trend cret  Trend lactates   Patient with leukocytosis: recommend antibiotics   Serial Abdominal exams  Monitor for continued bowel function       Patient seen by senior resident and staffed with Dr. Liudmila Fortune MD  PGY1, General Surgery  07/01/25  7:08 PM  [facility]  Pager: [phone]    Martir Copeland DO   PGY2, General Surgery  07/02/25  12:56 AM  [facility] Surgery: Blue Team   Pager: [phone]     I have seen, examined, and reviewed the patients chart. I agree with the residents assessment and have made appropriate changes.    Eriberto Nur     
Considerable assist; intake normal  Or reduced; LOC full/confusion  [x] 40% Mainly in bed; Extensive disease; Mainly assist; intake normal or reduced; occasional assist; LOC full/confusion  [] 30% Bed Bound; Extensive disease; Total care; intake reduced; LOC full/confusion  [] 20% Bed Bound; Extensive disease; Total care; intake minimal; Drowsy/coma  [] 10% Bed Bound; Extensive disease; Total care; Mouth care only; Drowsy/coma  [] 0% Death      Vitals:    /71   Pulse (!) 120   Temp 98 °F (36.7 °C) (Oral)   Resp 16   Ht 1.93 m (6' 4\")   Wt 55.1 kg (121 lb 8 oz)   SpO2 100%   BMI 14.79 kg/m²     Labs:    BMP:   Recent Labs     07/01/25 1733 07/02/25  0605   * 128*   K 3.9 4.4   CL 80* 88*   CO2 26 25   BUN 38* 32*   CREATININE 2.5* 1.8*   GLUCOSE 138* 87   PHOS 4.8  --      CBC:   Recent Labs     07/01/25 1733 07/02/25  0605   WBC 22.1* 12.7*   HGB 9.6* 8.7*   HCT 31.0* 27.8*   * 422       LFT's:   Recent Labs     07/01/25 1733 07/02/25  0605   AST 20 19   ALT 8* 9*   BILITOT 0.6 0.3   ALKPHOS 139* 94     XR CHEST PORTABLE   Final Result   1.  No evidence of acute cardiopulmonary disease.      Electronically signed by Brody Rain MD      CT ABDOMEN PELVIS W IV CONTRAST Additional Contrast? None   Final Result   1. The patient is status post subtotal colectomy with creation of a Macrina's pouch and right lower quadrant ileostomy. There is a focally dilated loop of small bowel with transition point of the distal small bowel near its entry to ostomy. Proximal end    of the dilated loop of small bowel is also decompressed near the more distal transition point. Findings may reflect a closed loop obstruction. Water-soluble contrast ileostomy enema may be helpful to to further characterize.   2. Cholelithiasis without CT evidence of cholecystitis.             Electronically signed by Brody Rain MD          Conclusion/Time spent:         Recommendations see above    Time spent

## 2025-07-09 PROBLEM — L73.2 HIDRADENITIS SUPPURATIVA: Status: ACTIVE | Noted: 2025-07-09

## 2025-07-09 PROBLEM — K50.119 CROHN'S DISEASE OF COLON WITH COMPLICATION (HCC): Status: ACTIVE | Noted: 2025-07-09

## 2025-07-09 PROBLEM — S31.809A: Status: ACTIVE | Noted: 2025-07-09

## 2025-07-09 PROBLEM — D72.829 LEUKOCYTOSIS: Status: ACTIVE | Noted: 2025-07-09

## 2025-07-09 LAB
25(OH)D3 SERPL-MCNC: 6 NG/ML
ANION GAP SERPL CALCULATED.3IONS-SCNC: 8 MMOL/L (ref 3–16)
BASOPHILS # BLD: 0.1 K/UL (ref 0–0.2)
BASOPHILS NFR BLD: 0.7 %
BUN SERPL-MCNC: 7 MG/DL (ref 7–20)
CALCIUM SERPL-MCNC: 8 MG/DL (ref 8.3–10.6)
CHLORIDE SERPL-SCNC: 106 MMOL/L (ref 99–110)
CHOLEST SERPL-MCNC: 58 MG/DL (ref 0–199)
CO2 SERPL-SCNC: 25 MMOL/L (ref 21–32)
CREAT SERPL-MCNC: 0.6 MG/DL (ref 0.9–1.3)
DEPRECATED RDW RBC AUTO: 18.5 % (ref 12.4–15.4)
EOSINOPHIL # BLD: 0.2 K/UL (ref 0–0.6)
EOSINOPHIL NFR BLD: 1.4 %
EST. AVERAGE GLUCOSE BLD GHB EST-MCNC: 111.2 MG/DL
GFR SERPLBLD CREATININE-BSD FMLA CKD-EPI: >90 ML/MIN/{1.73_M2}
GLUCOSE SERPL-MCNC: 104 MG/DL (ref 70–99)
HBA1C MFR BLD: 5.5 %
HCT VFR BLD AUTO: 22.7 % (ref 40.5–52.5)
HDLC SERPL-MCNC: 24 MG/DL (ref 40–60)
HGB BLD-MCNC: 7.2 G/DL (ref 13.5–17.5)
LDLC SERPL CALC-MCNC: 19 MG/DL
LYMPHOCYTES # BLD: 2.2 K/UL (ref 1–5.1)
LYMPHOCYTES NFR BLD: 19.9 %
MCH RBC QN AUTO: 24.8 PG (ref 26–34)
MCHC RBC AUTO-ENTMCNC: 31.6 G/DL (ref 31–36)
MCV RBC AUTO: 78.6 FL (ref 80–100)
MONOCYTES # BLD: 0.7 K/UL (ref 0–1.3)
MONOCYTES NFR BLD: 6.1 %
NEUTROPHILS # BLD: 7.8 K/UL (ref 1.7–7.7)
NEUTROPHILS NFR BLD: 71.9 %
PLATELET # BLD AUTO: 426 K/UL (ref 135–450)
PMV BLD AUTO: 6.9 FL (ref 5–10.5)
POTASSIUM SERPL-SCNC: 3.7 MMOL/L (ref 3.5–5.1)
RBC # BLD AUTO: 2.89 M/UL (ref 4.2–5.9)
SODIUM SERPL-SCNC: 139 MMOL/L (ref 136–145)
TRIGL SERPL-MCNC: 75 MG/DL (ref 0–150)
TSH SERPL DL<=0.005 MIU/L-ACNC: 0.71 UIU/ML (ref 0.27–4.2)
VLDLC SERPL CALC-MCNC: 15 MG/DL
WBC # BLD AUTO: 10.9 K/UL (ref 4–11)

## 2025-07-09 PROCEDURE — 6360000002 HC RX W HCPCS: Performed by: INTERNAL MEDICINE

## 2025-07-09 PROCEDURE — 84443 ASSAY THYROID STIM HORMONE: CPT

## 2025-07-09 PROCEDURE — 83036 HEMOGLOBIN GLYCOSYLATED A1C: CPT

## 2025-07-09 PROCEDURE — 2580000003 HC RX 258: Performed by: NURSE PRACTITIONER

## 2025-07-09 PROCEDURE — 6360000002 HC RX W HCPCS: Performed by: NURSE PRACTITIONER

## 2025-07-09 PROCEDURE — 36415 COLL VENOUS BLD VENIPUNCTURE: CPT

## 2025-07-09 PROCEDURE — 82306 VITAMIN D 25 HYDROXY: CPT

## 2025-07-09 PROCEDURE — 80048 BASIC METABOLIC PNL TOTAL CA: CPT

## 2025-07-09 PROCEDURE — 99231 SBSQ HOSP IP/OBS SF/LOW 25: CPT | Performed by: SURGERY

## 2025-07-09 PROCEDURE — 85025 COMPLETE CBC W/AUTO DIFF WBC: CPT

## 2025-07-09 PROCEDURE — 99233 SBSQ HOSP IP/OBS HIGH 50: CPT | Performed by: INTERNAL MEDICINE

## 2025-07-09 PROCEDURE — 6370000000 HC RX 637 (ALT 250 FOR IP): Performed by: NURSE PRACTITIONER

## 2025-07-09 PROCEDURE — 6370000000 HC RX 637 (ALT 250 FOR IP): Performed by: FAMILY MEDICINE

## 2025-07-09 PROCEDURE — 2500000003 HC RX 250 WO HCPCS: Performed by: INTERNAL MEDICINE

## 2025-07-09 PROCEDURE — 2580000003 HC RX 258: Performed by: INTERNAL MEDICINE

## 2025-07-09 PROCEDURE — 2060000000 HC ICU INTERMEDIATE R&B

## 2025-07-09 PROCEDURE — 80061 LIPID PANEL: CPT

## 2025-07-09 RX ORDER — MIRTAZAPINE 15 MG/1
30 TABLET, FILM COATED ORAL NIGHTLY
Status: DISCONTINUED | OUTPATIENT
Start: 2025-07-09 | End: 2025-07-12

## 2025-07-09 RX ORDER — MORPHINE SULFATE 2 MG/ML
1 INJECTION, SOLUTION INTRAMUSCULAR; INTRAVENOUS
Status: DISCONTINUED | OUTPATIENT
Start: 2025-07-09 | End: 2025-07-09

## 2025-07-09 RX ORDER — MORPHINE SULFATE 2 MG/ML
1 INJECTION, SOLUTION INTRAMUSCULAR; INTRAVENOUS EVERY 4 HOURS PRN
Status: DISCONTINUED | OUTPATIENT
Start: 2025-07-09 | End: 2025-07-10

## 2025-07-09 RX ORDER — ARIPIPRAZOLE 5 MG/1
5 TABLET ORAL DAILY
Status: DISCONTINUED | OUTPATIENT
Start: 2025-07-09 | End: 2025-07-12

## 2025-07-09 RX ORDER — OXYCODONE HYDROCHLORIDE 5 MG/1
5 TABLET ORAL EVERY 4 HOURS PRN
Refills: 0 | Status: DISCONTINUED | OUTPATIENT
Start: 2025-07-09 | End: 2025-07-11

## 2025-07-09 RX ORDER — OXYCODONE HYDROCHLORIDE 5 MG/1
10 TABLET ORAL EVERY 4 HOURS PRN
Refills: 0 | Status: DISCONTINUED | OUTPATIENT
Start: 2025-07-09 | End: 2025-07-11

## 2025-07-09 RX ADMIN — PREGABALIN 100 MG: 50 CAPSULE ORAL at 09:26

## 2025-07-09 RX ADMIN — PIPERACILLIN AND TAZOBACTAM 3375 MG: 3; .375 INJECTION, POWDER, LYOPHILIZED, FOR SOLUTION INTRAVENOUS at 10:59

## 2025-07-09 RX ADMIN — DICYCLOMINE HYDROCHLORIDE 10 MG: 10 CAPSULE ORAL at 05:53

## 2025-07-09 RX ADMIN — DICYCLOMINE HYDROCHLORIDE 10 MG: 10 CAPSULE ORAL at 17:14

## 2025-07-09 RX ADMIN — MORPHINE SULFATE 1 MG: 2 INJECTION, SOLUTION INTRAMUSCULAR; INTRAVENOUS at 03:32

## 2025-07-09 RX ADMIN — Medication 400 MG: at 09:26

## 2025-07-09 RX ADMIN — MIRTAZAPINE 30 MG: 15 TABLET, FILM COATED ORAL at 21:41

## 2025-07-09 RX ADMIN — PREGABALIN 100 MG: 50 CAPSULE ORAL at 21:41

## 2025-07-09 RX ADMIN — SODIUM CHLORIDE 1250 MG: 0.9 INJECTION, SOLUTION INTRAVENOUS at 22:00

## 2025-07-09 RX ADMIN — PIPERACILLIN AND TAZOBACTAM 3375 MG: 3; .375 INJECTION, POWDER, LYOPHILIZED, FOR SOLUTION INTRAVENOUS at 18:22

## 2025-07-09 RX ADMIN — ARIPIPRAZOLE 5 MG: 5 TABLET ORAL at 11:00

## 2025-07-09 RX ADMIN — DICYCLOMINE HYDROCHLORIDE 10 MG: 10 CAPSULE ORAL at 11:00

## 2025-07-09 RX ADMIN — PIPERACILLIN AND TAZOBACTAM 3375 MG: 3; .375 INJECTION, POWDER, LYOPHILIZED, FOR SOLUTION INTRAVENOUS at 03:35

## 2025-07-09 RX ADMIN — Medication 400 MG: at 21:41

## 2025-07-09 RX ADMIN — PANTOPRAZOLE SODIUM 40 MG: 40 TABLET, DELAYED RELEASE ORAL at 05:53

## 2025-07-09 RX ADMIN — SODIUM CHLORIDE 1250 MG: 0.9 INJECTION, SOLUTION INTRAVENOUS at 08:03

## 2025-07-09 RX ADMIN — DULOXETINE 30 MG: 30 CAPSULE, DELAYED RELEASE ORAL at 09:26

## 2025-07-09 RX ADMIN — DICYCLOMINE HYDROCHLORIDE 10 MG: 10 CAPSULE ORAL at 21:41

## 2025-07-09 RX ADMIN — METOPROLOL SUCCINATE 100 MG: 100 TABLET, EXTENDED RELEASE ORAL at 09:26

## 2025-07-09 RX ADMIN — SODIUM CHLORIDE, PRESERVATIVE FREE 10 ML: 5 INJECTION INTRAVENOUS at 21:42

## 2025-07-09 RX ADMIN — MORPHINE SULFATE 1 MG: 2 INJECTION, SOLUTION INTRAMUSCULAR; INTRAVENOUS at 21:00

## 2025-07-09 ASSESSMENT — PAIN SCALES - GENERAL
PAINLEVEL_OUTOF10: 10
PAINLEVEL_OUTOF10: 10
PAINLEVEL_OUTOF10: 0
PAINLEVEL_OUTOF10: 10
PAINLEVEL_OUTOF10: 0
PAINLEVEL_OUTOF10: 10
PAINLEVEL_OUTOF10: 3
PAINLEVEL_OUTOF10: 10

## 2025-07-09 ASSESSMENT — PAIN DESCRIPTION - DESCRIPTORS
DESCRIPTORS: SHARP;STABBING
DESCRIPTORS: DISCOMFORT
DESCRIPTORS: DISCOMFORT
DESCRIPTORS: ACHING

## 2025-07-09 ASSESSMENT — PAIN DESCRIPTION - ORIENTATION
ORIENTATION: POSTERIOR

## 2025-07-09 ASSESSMENT — PAIN DESCRIPTION - FREQUENCY
FREQUENCY: CONTINUOUS

## 2025-07-09 ASSESSMENT — PAIN DESCRIPTION - LOCATION
LOCATION: RECTUM

## 2025-07-09 ASSESSMENT — PAIN DESCRIPTION - PAIN TYPE
TYPE: CHRONIC PAIN

## 2025-07-09 ASSESSMENT — PAIN - FUNCTIONAL ASSESSMENT
PAIN_FUNCTIONAL_ASSESSMENT: PREVENTS OR INTERFERES SOME ACTIVE ACTIVITIES AND ADLS

## 2025-07-09 ASSESSMENT — PAIN DESCRIPTION - ONSET
ONSET: GRADUAL
ONSET: ON-GOING

## 2025-07-09 ASSESSMENT — PAIN SCALES - WONG BAKER: WONGBAKER_NUMERICALRESPONSE: NO HURT

## 2025-07-09 NOTE — PLAN OF CARE
Problem: Discharge Planning  Goal: Discharge to home or other facility with appropriate resources  7/9/2025 0432 by Harsh Bell RN  Outcome: Progressing  Flowsheets (Taken 7/9/2025 0432)  Discharge to home or other facility with appropriate resources:   Identify barriers to discharge with patient and caregiver   Arrange for needed discharge resources and transportation as appropriate   Identify discharge learning needs (meds, wound care, etc)     Problem: Pain  Goal: Verbalizes/displays adequate comfort level or baseline comfort level  7/9/2025 0432 by Harsh Bell RN  Outcome: Progressing  Flowsheets (Taken 7/9/2025 0432)  Verbalizes/displays adequate comfort level or baseline comfort level:   Encourage patient to monitor pain and request assistance   Assess pain using appropriate pain scale   Administer analgesics based on type and severity of pain and evaluate response   Implement non-pharmacological measures as appropriate and evaluate response     Problem: Safety - Adult  Goal: Free from fall injury  7/9/2025 0432 by Harsh Bell RN  Outcome: Progressing  Flowsheets (Taken 7/9/2025 0432)  Free From Fall Injury: Instruct family/caregiver on patient safety

## 2025-07-09 NOTE — PLAN OF CARE
Problem: Discharge Planning  Goal: Discharge to home or other facility with appropriate resources  7/9/2025 1129 by Cherise Saeed, RN  Outcome: Progressing  Flowsheets (Taken 7/9/2025 1129)  Discharge to home or other facility with appropriate resources:   Identify barriers to discharge with patient and caregiver   Identify discharge learning needs (meds, wound care, etc)   Refer to discharge planning if patient needs post-hospital services based on physician order or complex needs related to functional status, cognitive ability or social support system  Note: Patient awaiting pre-cert     Problem: Pain  Goal: Verbalizes/displays adequate comfort level or baseline comfort level  7/9/2025 1129 by Cherise Saeed, RN  Outcome: Progressing  Flowsheets (Taken 7/9/2025 1129)  Verbalizes/displays adequate comfort level or baseline comfort level:   Encourage patient to monitor pain and request assistance   Administer analgesics based on type and severity of pain and evaluate response   Consider cultural and social influences on pain and pain management  Note: Patient utilizing pharmacological and non-pharmacological interventions for pain management as needed. Will continue to monitor patient pain levels and apply interventions as requested. Patient satisfied at this time.       Problem: Skin/Tissue Integrity  Goal: Skin integrity remains intact  Description: 1.  Monitor for areas of redness and/or skin breakdown  2.  Assess vascular access sites hourly  3.  Every 4-6 hours minimum:  Change oxygen saturation probe site  4.  Every 4-6 hours:  If on nasal continuous positive airway pressure, respiratory therapy assess nares and determine need for appliance change or resting period  Outcome: Progressing  Flowsheets (Taken 7/9/2025 1129)  Skin Integrity Remains Intact:   Monitor for areas of redness and/or skin breakdown   Assess vascular access sites hourly   Every 4-6 hours minimum:  Change oxygen saturation probe site

## 2025-07-10 LAB
ANION GAP SERPL CALCULATED.3IONS-SCNC: 10 MMOL/L (ref 3–16)
BASOPHILS # BLD: 0.1 K/UL (ref 0–0.2)
BASOPHILS NFR BLD: 0.7 %
BUN SERPL-MCNC: 5 MG/DL (ref 7–20)
CALCIUM SERPL-MCNC: 8.3 MG/DL (ref 8.3–10.6)
CHLORIDE SERPL-SCNC: 103 MMOL/L (ref 99–110)
CO2 SERPL-SCNC: 24 MMOL/L (ref 21–32)
CREAT SERPL-MCNC: 0.5 MG/DL (ref 0.9–1.3)
DEPRECATED RDW RBC AUTO: 17.8 % (ref 12.4–15.4)
EOSINOPHIL # BLD: 0.2 K/UL (ref 0–0.6)
EOSINOPHIL NFR BLD: 1.5 %
GFR SERPLBLD CREATININE-BSD FMLA CKD-EPI: >90 ML/MIN/{1.73_M2}
GLUCOSE SERPL-MCNC: 113 MG/DL (ref 70–99)
HCT VFR BLD AUTO: 22.9 % (ref 40.5–52.5)
HGB BLD-MCNC: 7.2 G/DL (ref 13.5–17.5)
LYMPHOCYTES # BLD: 2.5 K/UL (ref 1–5.1)
LYMPHOCYTES NFR BLD: 19.1 %
MCH RBC QN AUTO: 24.3 PG (ref 26–34)
MCHC RBC AUTO-ENTMCNC: 31.4 G/DL (ref 31–36)
MCV RBC AUTO: 77.4 FL (ref 80–100)
MONOCYTES # BLD: 0.6 K/UL (ref 0–1.3)
MONOCYTES NFR BLD: 4.8 %
NEUTROPHILS # BLD: 9.5 K/UL (ref 1.7–7.7)
NEUTROPHILS NFR BLD: 73.9 %
PLATELET # BLD AUTO: 420 K/UL (ref 135–450)
PMV BLD AUTO: 6.6 FL (ref 5–10.5)
POTASSIUM SERPL-SCNC: 3.7 MMOL/L (ref 3.5–5.1)
RBC # BLD AUTO: 2.95 M/UL (ref 4.2–5.9)
SODIUM SERPL-SCNC: 137 MMOL/L (ref 136–145)
VANCOMYCIN SERPL-MCNC: 34.1 UG/ML
WBC # BLD AUTO: 12.8 K/UL (ref 4–11)

## 2025-07-10 PROCEDURE — 6370000000 HC RX 637 (ALT 250 FOR IP): Performed by: NURSE PRACTITIONER

## 2025-07-10 PROCEDURE — 85025 COMPLETE CBC W/AUTO DIFF WBC: CPT

## 2025-07-10 PROCEDURE — 80048 BASIC METABOLIC PNL TOTAL CA: CPT

## 2025-07-10 PROCEDURE — 36415 COLL VENOUS BLD VENIPUNCTURE: CPT

## 2025-07-10 PROCEDURE — 99232 SBSQ HOSP IP/OBS MODERATE 35: CPT | Performed by: INTERNAL MEDICINE

## 2025-07-10 PROCEDURE — 2060000000 HC ICU INTERMEDIATE R&B

## 2025-07-10 PROCEDURE — 2580000003 HC RX 258: Performed by: NURSE PRACTITIONER

## 2025-07-10 PROCEDURE — 6360000002 HC RX W HCPCS: Performed by: FAMILY MEDICINE

## 2025-07-10 PROCEDURE — 2580000003 HC RX 258: Performed by: INTERNAL MEDICINE

## 2025-07-10 PROCEDURE — 6360000002 HC RX W HCPCS: Performed by: NURSE PRACTITIONER

## 2025-07-10 PROCEDURE — 6360000002 HC RX W HCPCS: Performed by: INTERNAL MEDICINE

## 2025-07-10 PROCEDURE — 6370000000 HC RX 637 (ALT 250 FOR IP): Performed by: FAMILY MEDICINE

## 2025-07-10 PROCEDURE — 2500000003 HC RX 250 WO HCPCS: Performed by: INTERNAL MEDICINE

## 2025-07-10 PROCEDURE — 80202 ASSAY OF VANCOMYCIN: CPT

## 2025-07-10 RX ORDER — NALOXONE HYDROCHLORIDE 0.4 MG/ML
0.4 INJECTION, SOLUTION INTRAMUSCULAR; INTRAVENOUS; SUBCUTANEOUS PRN
Status: DISCONTINUED | OUTPATIENT
Start: 2025-07-10 | End: 2025-07-15 | Stop reason: HOSPADM

## 2025-07-10 RX ORDER — MORPHINE SULFATE 2 MG/ML
2 INJECTION, SOLUTION INTRAMUSCULAR; INTRAVENOUS
Status: DISCONTINUED | OUTPATIENT
Start: 2025-07-10 | End: 2025-07-12

## 2025-07-10 RX ORDER — MORPHINE SULFATE 2 MG/ML
2 INJECTION, SOLUTION INTRAMUSCULAR; INTRAVENOUS
Status: DISCONTINUED | OUTPATIENT
Start: 2025-07-10 | End: 2025-07-10

## 2025-07-10 RX ORDER — SODIUM CHLORIDE 9 MG/ML
INJECTION, SOLUTION INTRAVENOUS CONTINUOUS
Status: ACTIVE | OUTPATIENT
Start: 2025-07-10 | End: 2025-07-10

## 2025-07-10 RX ORDER — FENTANYL 25 UG/1
1 PATCH TRANSDERMAL
Refills: 0 | Status: DISCONTINUED | OUTPATIENT
Start: 2025-07-10 | End: 2025-07-11

## 2025-07-10 RX ORDER — MORPHINE SULFATE 4 MG/ML
4 INJECTION, SOLUTION INTRAMUSCULAR; INTRAVENOUS
Refills: 0 | Status: DISCONTINUED | OUTPATIENT
Start: 2025-07-10 | End: 2025-07-10

## 2025-07-10 RX ORDER — MORPHINE SULFATE 2 MG/ML
2 INJECTION, SOLUTION INTRAMUSCULAR; INTRAVENOUS
Refills: 0 | Status: DISCONTINUED | OUTPATIENT
Start: 2025-07-10 | End: 2025-07-10

## 2025-07-10 RX ORDER — ERGOCALCIFEROL 1.25 MG/1
50000 CAPSULE, LIQUID FILLED ORAL WEEKLY
Status: DISCONTINUED | OUTPATIENT
Start: 2025-07-10 | End: 2025-07-15 | Stop reason: HOSPADM

## 2025-07-10 RX ADMIN — METRONIDAZOLE 500 MG: 500 TABLET ORAL at 19:52

## 2025-07-10 RX ADMIN — MORPHINE SULFATE 2 MG: 2 INJECTION, SOLUTION INTRAMUSCULAR; INTRAVENOUS at 01:00

## 2025-07-10 RX ADMIN — SODIUM CHLORIDE, PRESERVATIVE FREE 10 ML: 5 INJECTION INTRAVENOUS at 08:50

## 2025-07-10 RX ADMIN — Medication 400 MG: at 19:52

## 2025-07-10 RX ADMIN — PREGABALIN 100 MG: 50 CAPSULE ORAL at 13:52

## 2025-07-10 RX ADMIN — PIPERACILLIN AND TAZOBACTAM 3375 MG: 3; .375 INJECTION, POWDER, LYOPHILIZED, FOR SOLUTION INTRAVENOUS at 11:17

## 2025-07-10 RX ADMIN — MORPHINE SULFATE 2 MG: 2 INJECTION, SOLUTION INTRAMUSCULAR; INTRAVENOUS at 15:47

## 2025-07-10 RX ADMIN — MORPHINE SULFATE 2 MG: 2 INJECTION, SOLUTION INTRAMUSCULAR; INTRAVENOUS at 17:53

## 2025-07-10 RX ADMIN — MORPHINE SULFATE 2 MG: 2 INJECTION, SOLUTION INTRAMUSCULAR; INTRAVENOUS at 06:44

## 2025-07-10 RX ADMIN — Medication 400 MG: at 08:49

## 2025-07-10 RX ADMIN — MORPHINE SULFATE 2 MG: 2 INJECTION, SOLUTION INTRAMUSCULAR; INTRAVENOUS at 23:20

## 2025-07-10 RX ADMIN — ERGOCALCIFEROL 50000 UNITS: 1.25 CAPSULE ORAL at 08:49

## 2025-07-10 RX ADMIN — DICYCLOMINE HYDROCHLORIDE 10 MG: 10 CAPSULE ORAL at 17:53

## 2025-07-10 RX ADMIN — SODIUM CHLORIDE 1250 MG: 0.9 INJECTION, SOLUTION INTRAVENOUS at 20:03

## 2025-07-10 RX ADMIN — MORPHINE SULFATE 2 MG: 2 INJECTION, SOLUTION INTRAMUSCULAR; INTRAVENOUS at 13:53

## 2025-07-10 RX ADMIN — MORPHINE SULFATE 2 MG: 2 INJECTION, SOLUTION INTRAMUSCULAR; INTRAVENOUS at 11:49

## 2025-07-10 RX ADMIN — PIPERACILLIN AND TAZOBACTAM 3375 MG: 3; .375 INJECTION, POWDER, LYOPHILIZED, FOR SOLUTION INTRAVENOUS at 02:07

## 2025-07-10 RX ADMIN — PANTOPRAZOLE SODIUM 40 MG: 40 TABLET, DELAYED RELEASE ORAL at 06:23

## 2025-07-10 RX ADMIN — DICYCLOMINE HYDROCHLORIDE 10 MG: 10 CAPSULE ORAL at 19:52

## 2025-07-10 RX ADMIN — SODIUM CHLORIDE, PRESERVATIVE FREE 10 ML: 5 INJECTION INTRAVENOUS at 20:02

## 2025-07-10 RX ADMIN — MORPHINE SULFATE 2 MG: 2 INJECTION, SOLUTION INTRAMUSCULAR; INTRAVENOUS at 09:45

## 2025-07-10 RX ADMIN — SODIUM CHLORIDE: 0.9 INJECTION, SOLUTION INTRAVENOUS at 04:23

## 2025-07-10 RX ADMIN — PIPERACILLIN AND TAZOBACTAM 3375 MG: 3; .375 INJECTION, POWDER, LYOPHILIZED, FOR SOLUTION INTRAVENOUS at 18:56

## 2025-07-10 RX ADMIN — METOPROLOL SUCCINATE 100 MG: 100 TABLET, EXTENDED RELEASE ORAL at 08:49

## 2025-07-10 RX ADMIN — DICYCLOMINE HYDROCHLORIDE 10 MG: 10 CAPSULE ORAL at 11:18

## 2025-07-10 RX ADMIN — PREGABALIN 100 MG: 50 CAPSULE ORAL at 08:49

## 2025-07-10 RX ADMIN — PREGABALIN 100 MG: 50 CAPSULE ORAL at 19:53

## 2025-07-10 RX ADMIN — SODIUM CHLORIDE 1250 MG: 0.9 INJECTION, SOLUTION INTRAVENOUS at 08:57

## 2025-07-10 RX ADMIN — MIRTAZAPINE 30 MG: 15 TABLET, FILM COATED ORAL at 19:52

## 2025-07-10 RX ADMIN — POTASSIUM CHLORIDE 40 MEQ: 1500 TABLET, EXTENDED RELEASE ORAL at 08:48

## 2025-07-10 RX ADMIN — DICYCLOMINE HYDROCHLORIDE 10 MG: 10 CAPSULE ORAL at 06:23

## 2025-07-10 RX ADMIN — MORPHINE SULFATE 2 MG: 2 INJECTION, SOLUTION INTRAMUSCULAR; INTRAVENOUS at 19:50

## 2025-07-10 RX ADMIN — ARIPIPRAZOLE 5 MG: 5 TABLET ORAL at 08:49

## 2025-07-10 ASSESSMENT — PAIN SCALES - GENERAL
PAINLEVEL_OUTOF10: 9
PAINLEVEL_OUTOF10: 2
PAINLEVEL_OUTOF10: 10
PAINLEVEL_OUTOF10: 10
PAINLEVEL_OUTOF10: 7
PAINLEVEL_OUTOF10: 10
PAINLEVEL_OUTOF10: 10
PAINLEVEL_OUTOF10: 9
PAINLEVEL_OUTOF10: 9
PAINLEVEL_OUTOF10: 8
PAINLEVEL_OUTOF10: 10
PAINLEVEL_OUTOF10: 9
PAINLEVEL_OUTOF10: 10
PAINLEVEL_OUTOF10: 8
PAINLEVEL_OUTOF10: 10

## 2025-07-10 ASSESSMENT — PAIN DESCRIPTION - ORIENTATION
ORIENTATION: POSTERIOR
ORIENTATION: RIGHT;LEFT
ORIENTATION: POSTERIOR
ORIENTATION: POSTERIOR
ORIENTATION: RIGHT;LEFT
ORIENTATION: POSTERIOR

## 2025-07-10 ASSESSMENT — PAIN DESCRIPTION - ONSET
ONSET: ON-GOING

## 2025-07-10 ASSESSMENT — PAIN - FUNCTIONAL ASSESSMENT
PAIN_FUNCTIONAL_ASSESSMENT: PREVENTS OR INTERFERES SOME ACTIVE ACTIVITIES AND ADLS

## 2025-07-10 ASSESSMENT — PAIN DESCRIPTION - FREQUENCY
FREQUENCY: CONTINUOUS

## 2025-07-10 ASSESSMENT — PAIN DESCRIPTION - LOCATION
LOCATION: ABDOMEN;RECTUM
LOCATION: RECTUM
LOCATION: ABDOMEN;RECTUM
LOCATION: ABDOMEN
LOCATION: ABDOMEN;RECTUM
LOCATION: ABDOMEN
LOCATION: RECTUM
LOCATION: ABDOMEN;RECTUM

## 2025-07-10 ASSESSMENT — PAIN DESCRIPTION - PAIN TYPE
TYPE: CHRONIC PAIN
TYPE: SURGICAL PAIN;CHRONIC PAIN
TYPE: CHRONIC PAIN;SURGICAL PAIN
TYPE: CHRONIC PAIN

## 2025-07-10 ASSESSMENT — PAIN DESCRIPTION - DESCRIPTORS
DESCRIPTORS: SHARP;STABBING
DESCRIPTORS: SHARP;STABBING
DESCRIPTORS: ACHING
DESCRIPTORS: SHARP;STABBING
DESCRIPTORS: ACHING
DESCRIPTORS: ACHING;DISCOMFORT;PRESSURE
DESCRIPTORS: ACHING;DISCOMFORT;PRESSURE
DESCRIPTORS: STABBING;SHARP

## 2025-07-10 ASSESSMENT — PAIN SCALES - WONG BAKER: WONGBAKER_NUMERICALRESPONSE: NO HURT

## 2025-07-10 NOTE — CARE COORDINATION
Case Management Assessment           Daily Note                 Date/ Time of Note: 7/10/2025 2:27 PM         Note completed by: Elizabeth Wei    Patient Name: Barbra Cortés  YOB: 2000    Diagnosis:Dehydration [E86.0]  Generalized abdominal pain [R10.84]  SIRS (systemic inflammatory response syndrome) (HCC) [R65.10]  Sepsis (HCC) [A41.9]  Patient Admission Status: Inpatient  Date of Admission:7/1/2025  3:59 PM    Length of Stay: 9 GLOS: GMLOS: 4.9 Readmission Risk Score: Readmission Risk Score: 17.3  ________________________________________________________________________________________  PT AM-PAC: 8 / 24 per last evaluation on: 7/8    OT AM-PAC: 13 / 24 per last evaluation on: 7/8    ________________________________________________________________________________________  Discharge Plan: SNF: [facility]  Pre-cert required for SNF: YES, PRE-CERT STARTED: 7/10  COVID Result:  No results found for: \"COVID19\"    Transportation PLAN for discharge: EMS transportation    Tentative discharge date: TBD    Potential assistance Purchasing Medications: Potential Assistance Purchasing Medications: No  Does Patient want to participate in local refill/ meds to beds program?:      Current barriers to discharge: Depression and Medical complications    Referrals completed: SNF: [facility]  ________________________________________________________________________________________  Case Management Notes:     CM received call from pt's aunt/POA- states she just left tour at [facility] at [place] and does not want pt to go there. Preference of [facility]- CM called to f/u on previous referral, liaison states they can accept pt for SNF admission they just need to know catheter size so that the correct supplies can be ordered and ready. CM will f/u with nursing to get correct catheter size.    CM requested precert be cancelled at [facility] and start for [facility]-

## 2025-07-10 NOTE — PLAN OF CARE
Problem: Discharge Planning  Goal: Discharge to home or other facility with appropriate resources  7/10/2025 0956 by Ron Buchanan RN  Outcome: Progressing  Flowsheets (Taken 7/10/2025 0956)  Discharge to home or other facility with appropriate resources:   Identify barriers to discharge with patient and caregiver   Arrange for needed discharge resources and transportation as appropriate   Identify discharge learning needs (meds, wound care, etc)   Arrange for interpreters to assist at discharge as needed   Refer to discharge planning if patient needs post-hospital services based on physician order or complex needs related to functional status, cognitive ability or social support system     Problem: Pain  Goal: Verbalizes/displays adequate comfort level or baseline comfort level  7/10/2025 0956 by Ron Buchanan RN  Outcome: Progressing  Flowsheets (Taken 7/10/2025 0956)  Verbalizes/displays adequate comfort level or baseline comfort level:   Encourage patient to monitor pain and request assistance   Assess pain using appropriate pain scale   Administer analgesics based on type and severity of pain and evaluate response   Implement non-pharmacological measures as appropriate and evaluate response   Consider cultural and social influences on pain and pain management   Notify Licensed Independent Practitioner if interventions unsuccessful or patient reports new pain     Problem: Nutrition Deficit:  Goal: Optimize nutritional status  Outcome: Progressing  Flowsheets (Taken 7/10/2025 0956)  Nutrient intake appropriate for improving, restoring, or maintaining nutritional needs:   Monitor oral intake, labs, and treatment plans   Assess nutritional status and recommend course of action   Recommend appropriate diets, oral nutritional supplements, and vitamin/mineral supplements   Provide specific nutrition education to patient or family as appropriate     Problem: Skin/Tissue Integrity  Goal: Skin integrity remains

## 2025-07-10 NOTE — PLAN OF CARE
Problem: Discharge Planning  Goal: Discharge to home or other facility with appropriate resources  Outcome: Progressing  Flowsheets (Taken 7/10/2025 0312)  Discharge to home or other facility with appropriate resources:   Identify barriers to discharge with patient and caregiver   Arrange for needed discharge resources and transportation as appropriate   Identify discharge learning needs (meds, wound care, etc)   Arrange for interpreters to assist at discharge as needed   Refer to discharge planning if patient needs post-hospital services based on physician order or complex needs related to functional status, cognitive ability or social support system     Problem: Pain  Goal: Verbalizes/displays adequate comfort level or baseline comfort level  Outcome: Progressing  Flowsheets (Taken 7/10/2025 0312)  Verbalizes/displays adequate comfort level or baseline comfort level:   Encourage patient to monitor pain and request assistance   Assess pain using appropriate pain scale   Administer analgesics based on type and severity of pain and evaluate response   Implement non-pharmacological measures as appropriate and evaluate response   Consider cultural and social influences on pain and pain management   Notify Licensed Independent Practitioner if interventions unsuccessful or patient reports new pain     Problem: Skin/Tissue Integrity  Goal: Skin integrity remains intact  Description: 1.  Monitor for areas of redness and/or skin breakdown  2.  Assess vascular access sites hourly  3.  Every 4-6 hours minimum:  Change oxygen saturation probe site  4.  Every 4-6 hours:  If on nasal continuous positive airway pressure, respiratory therapy assess nares and determine need for appliance change or resting period  Outcome: Progressing  Flowsheets (Taken 7/10/2025 0312)  Skin Integrity Remains Intact: Monitor for areas of redness and/or skin breakdown     Problem: Safety - Adult  Goal: Free from fall injury  Outcome:

## 2025-07-11 LAB
ABO/RH: NORMAL
ANION GAP SERPL CALCULATED.3IONS-SCNC: 10 MMOL/L (ref 3–16)
ANTIBODY SCREEN: NORMAL
BASOPHILS # BLD: 0.1 K/UL (ref 0–0.2)
BASOPHILS NFR BLD: 0.5 %
BLOOD BANK DISPENSE STATUS: NORMAL
BLOOD BANK PRODUCT CODE: NORMAL
BPU ID: NORMAL
BUN SERPL-MCNC: 3 MG/DL (ref 7–20)
CALCIUM SERPL-MCNC: 8.5 MG/DL (ref 8.3–10.6)
CHLORIDE SERPL-SCNC: 107 MMOL/L (ref 99–110)
CO2 SERPL-SCNC: 23 MMOL/L (ref 21–32)
CREAT SERPL-MCNC: 0.9 MG/DL (ref 0.9–1.3)
DEPRECATED RDW RBC AUTO: 17.8 % (ref 12.4–15.4)
DESCRIPTION BLOOD BANK: NORMAL
EOSINOPHIL # BLD: 0.2 K/UL (ref 0–0.6)
EOSINOPHIL NFR BLD: 1.3 %
GFR SERPLBLD CREATININE-BSD FMLA CKD-EPI: >90 ML/MIN/{1.73_M2}
GLUCOSE SERPL-MCNC: 114 MG/DL (ref 70–99)
HCT VFR BLD AUTO: 22.2 % (ref 40.5–52.5)
HCT VFR BLD AUTO: 27.7 % (ref 40.5–52.5)
HGB BLD-MCNC: 6.9 G/DL (ref 13.5–17.5)
HGB BLD-MCNC: 8.4 G/DL (ref 13.5–17.5)
LYMPHOCYTES # BLD: 1.9 K/UL (ref 1–5.1)
LYMPHOCYTES NFR BLD: 15.3 %
MCH RBC QN AUTO: 24 PG (ref 26–34)
MCHC RBC AUTO-ENTMCNC: 30.8 G/DL (ref 31–36)
MCV RBC AUTO: 77.9 FL (ref 80–100)
MONOCYTES # BLD: 0.8 K/UL (ref 0–1.3)
MONOCYTES NFR BLD: 6.4 %
NEUTROPHILS # BLD: 9.4 K/UL (ref 1.7–7.7)
NEUTROPHILS NFR BLD: 76.5 %
PLATELET # BLD AUTO: 368 K/UL (ref 135–450)
PMV BLD AUTO: 6.7 FL (ref 5–10.5)
POTASSIUM SERPL-SCNC: 3.4 MMOL/L (ref 3.5–5.1)
RBC # BLD AUTO: 2.85 M/UL (ref 4.2–5.9)
SODIUM SERPL-SCNC: 140 MMOL/L (ref 136–145)
VANCOMYCIN SERPL-MCNC: 28.9 UG/ML
WBC # BLD AUTO: 12.3 K/UL (ref 4–11)

## 2025-07-11 PROCEDURE — C1751 CATH, INF, PER/CENT/MIDLINE: HCPCS

## 2025-07-11 PROCEDURE — 85025 COMPLETE CBC W/AUTO DIFF WBC: CPT

## 2025-07-11 PROCEDURE — 2580000003 HC RX 258: Performed by: NURSE PRACTITIONER

## 2025-07-11 PROCEDURE — 85018 HEMOGLOBIN: CPT

## 2025-07-11 PROCEDURE — 36415 COLL VENOUS BLD VENIPUNCTURE: CPT

## 2025-07-11 PROCEDURE — 80202 ASSAY OF VANCOMYCIN: CPT

## 2025-07-11 PROCEDURE — 6370000000 HC RX 637 (ALT 250 FOR IP): Performed by: FAMILY MEDICINE

## 2025-07-11 PROCEDURE — 86850 RBC ANTIBODY SCREEN: CPT

## 2025-07-11 PROCEDURE — 86923 COMPATIBILITY TEST ELECTRIC: CPT

## 2025-07-11 PROCEDURE — 05H933Z INSERTION OF INFUSION DEVICE INTO RIGHT BRACHIAL VEIN, PERCUTANEOUS APPROACH: ICD-10-PCS | Performed by: HOSPITALIST

## 2025-07-11 PROCEDURE — 6360000002 HC RX W HCPCS: Performed by: INTERNAL MEDICINE

## 2025-07-11 PROCEDURE — 97530 THERAPEUTIC ACTIVITIES: CPT

## 2025-07-11 PROCEDURE — 80048 BASIC METABOLIC PNL TOTAL CA: CPT

## 2025-07-11 PROCEDURE — 2060000000 HC ICU INTERMEDIATE R&B

## 2025-07-11 PROCEDURE — P9016 RBC LEUKOCYTES REDUCED: HCPCS

## 2025-07-11 PROCEDURE — 2500000003 HC RX 250 WO HCPCS: Performed by: INTERNAL MEDICINE

## 2025-07-11 PROCEDURE — 97535 SELF CARE MNGMENT TRAINING: CPT

## 2025-07-11 PROCEDURE — 6360000002 HC RX W HCPCS: Performed by: FAMILY MEDICINE

## 2025-07-11 PROCEDURE — 6370000000 HC RX 637 (ALT 250 FOR IP): Performed by: NURSE PRACTITIONER

## 2025-07-11 PROCEDURE — 6360000002 HC RX W HCPCS: Performed by: NURSE PRACTITIONER

## 2025-07-11 PROCEDURE — 85014 HEMATOCRIT: CPT

## 2025-07-11 PROCEDURE — 2580000003 HC RX 258: Performed by: INTERNAL MEDICINE

## 2025-07-11 PROCEDURE — 86900 BLOOD TYPING SEROLOGIC ABO: CPT

## 2025-07-11 PROCEDURE — 99232 SBSQ HOSP IP/OBS MODERATE 35: CPT | Performed by: INTERNAL MEDICINE

## 2025-07-11 PROCEDURE — 36430 TRANSFUSION BLD/BLD COMPNT: CPT

## 2025-07-11 PROCEDURE — 36410 VNPNXR 3YR/> PHY/QHP DX/THER: CPT

## 2025-07-11 PROCEDURE — 30233N1 TRANSFUSION OF NONAUTOLOGOUS RED BLOOD CELLS INTO PERIPHERAL VEIN, PERCUTANEOUS APPROACH: ICD-10-PCS | Performed by: HOSPITALIST

## 2025-07-11 PROCEDURE — 86901 BLOOD TYPING SEROLOGIC RH(D): CPT

## 2025-07-11 RX ORDER — LIDOCAINE HYDROCHLORIDE 10 MG/ML
50 INJECTION, SOLUTION EPIDURAL; INFILTRATION; INTRACAUDAL; PERINEURAL ONCE
Status: COMPLETED | OUTPATIENT
Start: 2025-07-11 | End: 2025-07-11

## 2025-07-11 RX ORDER — HYDROMORPHONE HYDROCHLORIDE 2 MG/1
1 TABLET ORAL EVERY 4 HOURS PRN
Refills: 0 | Status: DISCONTINUED | OUTPATIENT
Start: 2025-07-11 | End: 2025-07-13

## 2025-07-11 RX ORDER — SODIUM CHLORIDE 9 MG/ML
INJECTION, SOLUTION INTRAVENOUS PRN
Status: DISCONTINUED | OUTPATIENT
Start: 2025-07-11 | End: 2025-07-15 | Stop reason: HOSPADM

## 2025-07-11 RX ORDER — SODIUM CHLORIDE 0.9 % (FLUSH) 0.9 %
5-40 SYRINGE (ML) INJECTION EVERY 12 HOURS SCHEDULED
Status: DISCONTINUED | OUTPATIENT
Start: 2025-07-11 | End: 2025-07-15 | Stop reason: HOSPADM

## 2025-07-11 RX ORDER — SODIUM CHLORIDE 0.9 % (FLUSH) 0.9 %
5-40 SYRINGE (ML) INJECTION PRN
Status: DISCONTINUED | OUTPATIENT
Start: 2025-07-11 | End: 2025-07-15 | Stop reason: HOSPADM

## 2025-07-11 RX ADMIN — POLYETHYLENE GLYCOL 3350 17 G: 17 POWDER, FOR SOLUTION ORAL at 21:19

## 2025-07-11 RX ADMIN — DICYCLOMINE HYDROCHLORIDE 10 MG: 10 CAPSULE ORAL at 11:38

## 2025-07-11 RX ADMIN — PREGABALIN 100 MG: 50 CAPSULE ORAL at 08:23

## 2025-07-11 RX ADMIN — DICYCLOMINE HYDROCHLORIDE 10 MG: 10 CAPSULE ORAL at 06:16

## 2025-07-11 RX ADMIN — PREGABALIN 100 MG: 50 CAPSULE ORAL at 14:08

## 2025-07-11 RX ADMIN — MORPHINE SULFATE 2 MG: 2 INJECTION, SOLUTION INTRAMUSCULAR; INTRAVENOUS at 16:16

## 2025-07-11 RX ADMIN — METRONIDAZOLE 500 MG: 500 TABLET ORAL at 21:18

## 2025-07-11 RX ADMIN — DICYCLOMINE HYDROCHLORIDE 10 MG: 10 CAPSULE ORAL at 16:45

## 2025-07-11 RX ADMIN — LIDOCAINE HYDROCHLORIDE ANHYDROUS 50 MG: 10 INJECTION, SOLUTION INFILTRATION at 15:09

## 2025-07-11 RX ADMIN — SODIUM CHLORIDE 1250 MG: 0.9 INJECTION, SOLUTION INTRAVENOUS at 08:26

## 2025-07-11 RX ADMIN — ARIPIPRAZOLE 5 MG: 5 TABLET ORAL at 08:23

## 2025-07-11 RX ADMIN — DICYCLOMINE HYDROCHLORIDE 10 MG: 10 CAPSULE ORAL at 21:18

## 2025-07-11 RX ADMIN — MORPHINE SULFATE 2 MG: 2 INJECTION, SOLUTION INTRAMUSCULAR; INTRAVENOUS at 14:08

## 2025-07-11 RX ADMIN — SODIUM CHLORIDE, PRESERVATIVE FREE 10 ML: 5 INJECTION INTRAVENOUS at 21:19

## 2025-07-11 RX ADMIN — MORPHINE SULFATE 2 MG: 2 INJECTION, SOLUTION INTRAMUSCULAR; INTRAVENOUS at 04:19

## 2025-07-11 RX ADMIN — Medication 400 MG: at 21:18

## 2025-07-11 RX ADMIN — MORPHINE SULFATE 2 MG: 2 INJECTION, SOLUTION INTRAMUSCULAR; INTRAVENOUS at 08:19

## 2025-07-11 RX ADMIN — MORPHINE SULFATE 2 MG: 2 INJECTION, SOLUTION INTRAMUSCULAR; INTRAVENOUS at 12:13

## 2025-07-11 RX ADMIN — MIRTAZAPINE 30 MG: 15 TABLET, FILM COATED ORAL at 21:18

## 2025-07-11 RX ADMIN — MORPHINE SULFATE 2 MG: 2 INJECTION, SOLUTION INTRAMUSCULAR; INTRAVENOUS at 18:10

## 2025-07-11 RX ADMIN — PREGABALIN 100 MG: 50 CAPSULE ORAL at 21:18

## 2025-07-11 RX ADMIN — MORPHINE SULFATE 2 MG: 2 INJECTION, SOLUTION INTRAMUSCULAR; INTRAVENOUS at 21:00

## 2025-07-11 RX ADMIN — METOPROLOL SUCCINATE 100 MG: 100 TABLET, EXTENDED RELEASE ORAL at 08:22

## 2025-07-11 RX ADMIN — PIPERACILLIN AND TAZOBACTAM 3375 MG: 3; .375 INJECTION, POWDER, LYOPHILIZED, FOR SOLUTION INTRAVENOUS at 03:11

## 2025-07-11 RX ADMIN — MORPHINE SULFATE 2 MG: 2 INJECTION, SOLUTION INTRAMUSCULAR; INTRAVENOUS at 02:00

## 2025-07-11 RX ADMIN — PIPERACILLIN AND TAZOBACTAM 3375 MG: 3; .375 INJECTION, POWDER, LYOPHILIZED, FOR SOLUTION INTRAVENOUS at 18:58

## 2025-07-11 RX ADMIN — PIPERACILLIN AND TAZOBACTAM 3375 MG: 3; .375 INJECTION, POWDER, LYOPHILIZED, FOR SOLUTION INTRAVENOUS at 11:38

## 2025-07-11 RX ADMIN — POTASSIUM CHLORIDE 40 MEQ: 1500 TABLET, EXTENDED RELEASE ORAL at 08:22

## 2025-07-11 RX ADMIN — PANTOPRAZOLE SODIUM 40 MG: 40 TABLET, DELAYED RELEASE ORAL at 06:15

## 2025-07-11 RX ADMIN — SODIUM CHLORIDE, PRESERVATIVE FREE 10 ML: 5 INJECTION INTRAVENOUS at 08:30

## 2025-07-11 RX ADMIN — MORPHINE SULFATE 2 MG: 2 INJECTION, SOLUTION INTRAMUSCULAR; INTRAVENOUS at 10:32

## 2025-07-11 RX ADMIN — MORPHINE SULFATE 2 MG: 2 INJECTION, SOLUTION INTRAMUSCULAR; INTRAVENOUS at 06:16

## 2025-07-11 RX ADMIN — Medication 400 MG: at 08:22

## 2025-07-11 ASSESSMENT — PAIN DESCRIPTION - ORIENTATION
ORIENTATION: INNER
ORIENTATION: MID
ORIENTATION: INNER
ORIENTATION: MID

## 2025-07-11 ASSESSMENT — PAIN - FUNCTIONAL ASSESSMENT
PAIN_FUNCTIONAL_ASSESSMENT: ACTIVITIES ARE NOT PREVENTED

## 2025-07-11 ASSESSMENT — PAIN DESCRIPTION - LOCATION
LOCATION: RECTUM
LOCATION: RECTUM
LOCATION: ABDOMEN;RECTUM
LOCATION: RECTUM
LOCATION: ABDOMEN;RECTUM
LOCATION: RECTUM

## 2025-07-11 ASSESSMENT — PAIN DESCRIPTION - ONSET
ONSET: ON-GOING

## 2025-07-11 ASSESSMENT — PAIN SCALES - GENERAL
PAINLEVEL_OUTOF10: 9
PAINLEVEL_OUTOF10: 10
PAINLEVEL_OUTOF10: 9
PAINLEVEL_OUTOF10: 10

## 2025-07-11 ASSESSMENT — PAIN DESCRIPTION - DESCRIPTORS
DESCRIPTORS: ACHING;STABBING
DESCRIPTORS: ACHING;DISCOMFORT;SORE
DESCRIPTORS: ACHING;STABBING
DESCRIPTORS: ACHING;DISCOMFORT;PRESSURE
DESCRIPTORS: ACHING;THROBBING;TIGHTNESS
DESCRIPTORS: ACHING;STABBING;SQUEEZING
DESCRIPTORS: ACHING;STABBING
DESCRIPTORS: SHARP;STABBING
DESCRIPTORS: ACHING;STABBING
DESCRIPTORS: ACHING;STABBING

## 2025-07-11 ASSESSMENT — PAIN DESCRIPTION - FREQUENCY
FREQUENCY: CONTINUOUS

## 2025-07-11 ASSESSMENT — PAIN DESCRIPTION - PAIN TYPE
TYPE: CHRONIC PAIN
TYPE: CHRONIC PAIN;SURGICAL PAIN
TYPE: CHRONIC PAIN
TYPE: SURGICAL PAIN;CHRONIC PAIN
TYPE: CHRONIC PAIN

## 2025-07-11 ASSESSMENT — PAIN SCALES - WONG BAKER: WONGBAKER_NUMERICALRESPONSE: NO HURT

## 2025-07-11 NOTE — DISCHARGE INSTR - COC
Continuity of Care Form    Patient Name: Barbra Cortés   :  2000  MRN:  [id number]    Admit date:  2025  Discharge date:  7/15/25    Code Status Order: Full Code   Advance Directives:     Admitting Physician:  Rishi Claros MD  PCP: Jade Lucia MD    Discharging Nurse: DENISHA Villegas  Discharging Hospital Unit/Room#: 4321/4321-01  Discharging Unit Phone Number: [phone]    Emergency Contact:   Extended Emergency Contact Information  Primary Emergency Contact: Quynh Ferro  Home Phone: [phone]  Relation: Aunt/Uncle    Past Surgical History:  History reviewed. No pertinent surgical history.    Immunization History:     There is no immunization history on file for this patient.    Active Problems:  Patient Active Problem List   Diagnosis Code    Pilonidal cyst with abscess L05.01    Sepsis ([facility]) A41.9    Dehydration E86.0    SIRS (systemic inflammatory response syndrome) ([facility]) R65.10    Generalized abdominal pain R10.84    MDD (major depressive disorder), recurrent severe, without psychosis ([facility]) F33.2    Anxiety F41.9    Open wound of buttock with complication, initial encounter S31.809A    Hidradenitis suppurativa L73.2    Crohn's disease of colon with complication ([facility]) K50.119    Leukocytosis D72.829       Isolation/Infection:   Isolation            No Isolation          Patient Infection Status    None to display         Nurse Assessment:  Last Vital Signs: BP (!) 135/93   Pulse (!) 116   Temp 98.6 °F (37 °C) (Oral)   Resp 20   Ht 1.93 m (6' 4\")   Wt 61.2 kg (134 lb 14.7 oz)   SpO2 100%   BMI 16.42 kg/m²     Last documented pain score (0-10 scale): Pain Level: 10  Last Weight:   Wt Readings from Last 1 Encounters:   25 61.2 kg (134 lb 14.7 oz)     Mental Status:  oriented and alert    IV Access:  - Central Venous Catheter  - site  right, insertion date: 25    Nursing Mobility/ADLs:  Walking   Dependent  Transfer  Dependent  Bathing  Dependent  Dressing

## 2025-07-11 NOTE — CONSENT
Informed Consent for Blood Component Transfusion Note    I have discussed with the patient the rationale for blood component transfusion; its benefits in treating or preventing fatigue, organ damage, or death; and its risk which includes mild transfusion reactions, rare risk of blood borne infection, or more serious but rare reactions. I have discussed the alternatives to transfusion, including the risk and consequences of not receiving transfusion. The patient had an opportunity to ask questions and had agreed to proceed with transfusion of blood components. Spoke with Quynh Ferro, aunt, via phone and updated provided.     Electronically signed by BOUCHRA Miller CNP on 7/11/25 at 8:13 AM EDT

## 2025-07-11 NOTE — PLAN OF CARE
Problem: Discharge Planning  Goal: Discharge to home or other facility with appropriate resources  7/11/2025 1344 by Kelsi Alvarenga RN  Outcome: Progressing  Flowsheets (Taken 7/11/2025 1344)  Discharge to home or other facility with appropriate resources:   Identify barriers to discharge with patient and caregiver   Arrange for needed discharge resources and transportation as appropriate   Identify discharge learning needs (meds, wound care, etc)     Problem: Pain  Goal: Verbalizes/displays adequate comfort level or baseline comfort level  7/11/2025 1344 by Kelsi Alvarenga RN  Outcome: Progressing  Flowsheets (Taken 7/11/2025 1344)  Verbalizes/displays adequate comfort level or baseline comfort level:   Encourage patient to monitor pain and request assistance   Assess pain using appropriate pain scale   Administer analgesics based on type and severity of pain and evaluate response   Implement non-pharmacological measures as appropriate and evaluate response     Problem: Skin/Tissue Integrity  Goal: Skin integrity remains intact  Description: 1.  Monitor for areas of redness and/or skin breakdown  2.  Assess vascular access sites hourly  3.  Every 4-6 hours minimum:  Change oxygen saturation probe site  4.  Every 4-6 hours:  If on nasal continuous positive airway pressure, respiratory therapy assess nares and determine need for appliance change or resting period  7/11/2025 1344 by Kelsi Alvarenga RN  Outcome: Progressing  Note: Monitoring as pt will allow.  Refusing turns at this time.  No new signs of skin breakdown that I can see.     Problem: Safety - Adult  Goal: Free from fall injury  7/11/2025 1344 by Kelsi Alvarenga RN  Outcome: Progressing  Flowsheets (Taken 7/11/2025 1344)  Free From Fall Injury: Instruct family/caregiver on patient safety  Note: Pt is a Fall Risk. See [place]tan Fall Risk Score. Pt bed in low position and side rails up. Call light and belongings in reach. Pt encouraged to call

## 2025-07-11 NOTE — PROCEDURES
PROCEDURE NOTE  Date: 2025   Name: Barbra Cortés  YOB: 2000    Procedures    POWER MIDLINE PROCEDURE NOTE  Chart reviewed for allergies, diagnosis, labs, known contraindications, reason for line placement and planned length of treatment.  Insertion procedure discussed with patient/family member.  Informed consent not required for Midline placement.  Three patient identifiers - Patient name,   and MRN -  completed &  confirmed verbally.   Hat, mask and eye shield donned.  Midline site scrubbed with Chloraprep  One-Step applicator  for 30 seconds x 1.   Hand Hygiene  performed with 3% Chlorhexidine surgical scrub x1 min prior to  Sterile gloves, sterile gown being donned.  Patient draped using maximal sterile barrier technique ( head to toe ).  Midline site scrubbed a 2nd time with Chloraprep One-Step applicator x 30 sec. Vein located by [employer] and site marked with sterile pen.  1% Lidocaine 5 ml injected intradermal pre-insertion for trimmed Midline.  Midline inserted.  Positive brisk blood return obtained.  Valve applied to lumen.  Midline flushed with 10 mls  0.9% Sterile Sodium Chloride. Midline flushes easily with no resistance.   Skin prep applied to site. Catheter secured with non-sutured locking device per hospital protocol.  Bio-patch/CHG impregnated sterile tegaderm dressing applied. Alcohol Swab Caps applied to valve.   Sterile field maintained during procedure. Positioning wire accounted for post procedure. Pt. Response to procedure, tolerated well. Appearance of site: Clean dry and intact without bleeding or edema. All edges of Tegaderm occlusive.   Site marked with date and initials of RN placing line.Top 2 side rails in up position, call button in reach, RN notified of all of the above.   A Power Midline 15 CM placed in the PAO brachial vein. 0 cm showing from insertion site.

## 2025-07-11 NOTE — PLAN OF CARE
Problem: Discharge Planning  Goal: Discharge to home or other facility with appropriate resources  Outcome: Progressing  Flowsheets (Taken 7/10/2025 0956 by Ron Buchanan, RN)  Discharge to home or other facility with appropriate resources:   Identify barriers to discharge with patient and caregiver   Arrange for needed discharge resources and transportation as appropriate   Identify discharge learning needs (meds, wound care, etc)   Arrange for interpreters to assist at discharge as needed   Refer to discharge planning if patient needs post-hospital services based on physician order or complex needs related to functional status, cognitive ability or social support system     Problem: Pain  Goal: Verbalizes/displays adequate comfort level or baseline comfort level  Outcome: Progressing  Flowsheets (Taken 7/10/2025 0956 by Ron Buchanan, RN)  Verbalizes/displays adequate comfort level or baseline comfort level:   Encourage patient to monitor pain and request assistance   Assess pain using appropriate pain scale   Administer analgesics based on type and severity of pain and evaluate response   Implement non-pharmacological measures as appropriate and evaluate response   Consider cultural and social influences on pain and pain management   Notify Licensed Independent Practitioner if interventions unsuccessful or patient reports new pain     Problem: Skin/Tissue Integrity  Goal: Skin integrity remains intact  Description: 1.  Monitor for areas of redness and/or skin breakdown  2.  Assess vascular access sites hourly  3.  Every 4-6 hours minimum:  Change oxygen saturation probe site  4.  Every 4-6 hours:  If on nasal continuous positive airway pressure, respiratory therapy assess nares and determine need for appliance change or resting period  Outcome: Progressing  Flowsheets (Taken 7/10/2025 0956 by Ron Buchanan, RN)  Skin Integrity Remains Intact:   Monitor for areas of redness and/or skin breakdown   Turn  electronic

## 2025-07-12 LAB
ANION GAP SERPL CALCULATED.3IONS-SCNC: 12 MMOL/L (ref 3–16)
BASOPHILS # BLD: 0 K/UL (ref 0–0.2)
BASOPHILS NFR BLD: 0.3 %
BUN SERPL-MCNC: 6 MG/DL (ref 7–20)
CALCIUM SERPL-MCNC: 8.6 MG/DL (ref 8.3–10.6)
CHLORIDE SERPL-SCNC: 106 MMOL/L (ref 99–110)
CO2 SERPL-SCNC: 25 MMOL/L (ref 21–32)
CREAT SERPL-MCNC: 1.2 MG/DL (ref 0.9–1.3)
DEPRECATED RDW RBC AUTO: 18.3 % (ref 12.4–15.4)
EOSINOPHIL # BLD: 0.1 K/UL (ref 0–0.6)
EOSINOPHIL NFR BLD: 0.5 %
GFR SERPLBLD CREATININE-BSD FMLA CKD-EPI: 87 ML/MIN/{1.73_M2}
GLUCOSE SERPL-MCNC: 106 MG/DL (ref 70–99)
HCT VFR BLD AUTO: 26.8 % (ref 40.5–52.5)
HGB BLD-MCNC: 8.4 G/DL (ref 13.5–17.5)
LYMPHOCYTES # BLD: 1.3 K/UL (ref 1–5.1)
LYMPHOCYTES NFR BLD: 7.1 %
MCH RBC QN AUTO: 24.5 PG (ref 26–34)
MCHC RBC AUTO-ENTMCNC: 31.3 G/DL (ref 31–36)
MCV RBC AUTO: 78.3 FL (ref 80–100)
MONOCYTES # BLD: 0.6 K/UL (ref 0–1.3)
MONOCYTES NFR BLD: 3.3 %
NEUTROPHILS # BLD: 16 K/UL (ref 1.7–7.7)
NEUTROPHILS NFR BLD: 88.8 %
PLATELET # BLD AUTO: 336 K/UL (ref 135–450)
PMV BLD AUTO: 7 FL (ref 5–10.5)
POTASSIUM SERPL-SCNC: 3.1 MMOL/L (ref 3.5–5.1)
RBC # BLD AUTO: 3.42 M/UL (ref 4.2–5.9)
SODIUM SERPL-SCNC: 143 MMOL/L (ref 136–145)
WBC # BLD AUTO: 18 K/UL (ref 4–11)

## 2025-07-12 PROCEDURE — 6370000000 HC RX 637 (ALT 250 FOR IP): Performed by: FAMILY MEDICINE

## 2025-07-12 PROCEDURE — 80048 BASIC METABOLIC PNL TOTAL CA: CPT

## 2025-07-12 PROCEDURE — 2580000003 HC RX 258: Performed by: NURSE PRACTITIONER

## 2025-07-12 PROCEDURE — 6370000000 HC RX 637 (ALT 250 FOR IP): Performed by: NURSE PRACTITIONER

## 2025-07-12 PROCEDURE — 36415 COLL VENOUS BLD VENIPUNCTURE: CPT

## 2025-07-12 PROCEDURE — 6360000002 HC RX W HCPCS: Performed by: NURSE PRACTITIONER

## 2025-07-12 PROCEDURE — 2580000003 HC RX 258: Performed by: INTERNAL MEDICINE

## 2025-07-12 PROCEDURE — 6360000002 HC RX W HCPCS: Performed by: FAMILY MEDICINE

## 2025-07-12 PROCEDURE — 2500000003 HC RX 250 WO HCPCS: Performed by: INTERNAL MEDICINE

## 2025-07-12 PROCEDURE — 6360000002 HC RX W HCPCS: Performed by: INTERNAL MEDICINE

## 2025-07-12 PROCEDURE — 2060000000 HC ICU INTERMEDIATE R&B

## 2025-07-12 PROCEDURE — 2580000003 HC RX 258: Performed by: FAMILY MEDICINE

## 2025-07-12 PROCEDURE — 85025 COMPLETE CBC W/AUTO DIFF WBC: CPT

## 2025-07-12 PROCEDURE — 99232 SBSQ HOSP IP/OBS MODERATE 35: CPT | Performed by: INTERNAL MEDICINE

## 2025-07-12 RX ORDER — MORPHINE SULFATE 2 MG/ML
2 INJECTION, SOLUTION INTRAMUSCULAR; INTRAVENOUS
Status: DISCONTINUED | OUTPATIENT
Start: 2025-07-12 | End: 2025-07-12

## 2025-07-12 RX ORDER — 0.9 % SODIUM CHLORIDE 0.9 %
250 INTRAVENOUS SOLUTION INTRAVENOUS ONCE
Status: COMPLETED | OUTPATIENT
Start: 2025-07-12 | End: 2025-07-12

## 2025-07-12 RX ORDER — ARIPIPRAZOLE 5 MG/1
10 TABLET ORAL DAILY
Status: DISCONTINUED | OUTPATIENT
Start: 2025-07-13 | End: 2025-07-15 | Stop reason: HOSPADM

## 2025-07-12 RX ORDER — MORPHINE SULFATE 2 MG/ML
2 INJECTION, SOLUTION INTRAMUSCULAR; INTRAVENOUS
Status: COMPLETED | OUTPATIENT
Start: 2025-07-12 | End: 2025-07-14

## 2025-07-12 RX ORDER — MIRTAZAPINE 15 MG/1
45 TABLET, FILM COATED ORAL NIGHTLY
Status: DISCONTINUED | OUTPATIENT
Start: 2025-07-12 | End: 2025-07-15 | Stop reason: HOSPADM

## 2025-07-12 RX ORDER — DICYCLOMINE HYDROCHLORIDE 10 MG/1
20 CAPSULE ORAL
Status: DISCONTINUED | OUTPATIENT
Start: 2025-07-12 | End: 2025-07-15 | Stop reason: HOSPADM

## 2025-07-12 RX ADMIN — DICYCLOMINE HYDROCHLORIDE 20 MG: 10 CAPSULE ORAL at 22:10

## 2025-07-12 RX ADMIN — MORPHINE SULFATE 2 MG: 2 INJECTION, SOLUTION INTRAMUSCULAR; INTRAVENOUS at 05:10

## 2025-07-12 RX ADMIN — Medication 400 MG: at 22:10

## 2025-07-12 RX ADMIN — PREGABALIN 100 MG: 50 CAPSULE ORAL at 22:10

## 2025-07-12 RX ADMIN — MORPHINE SULFATE 2 MG: 2 INJECTION, SOLUTION INTRAMUSCULAR; INTRAVENOUS at 10:45

## 2025-07-12 RX ADMIN — MORPHINE SULFATE 2 MG: 2 INJECTION, SOLUTION INTRAMUSCULAR; INTRAVENOUS at 08:35

## 2025-07-12 RX ADMIN — MORPHINE SULFATE 2 MG: 2 INJECTION, SOLUTION INTRAMUSCULAR; INTRAVENOUS at 02:50

## 2025-07-12 RX ADMIN — METOPROLOL SUCCINATE 100 MG: 100 TABLET, EXTENDED RELEASE ORAL at 08:36

## 2025-07-12 RX ADMIN — POTASSIUM CHLORIDE 40 MEQ: 1500 TABLET, EXTENDED RELEASE ORAL at 08:36

## 2025-07-12 RX ADMIN — PIPERACILLIN AND TAZOBACTAM 3375 MG: 3; .375 INJECTION, POWDER, LYOPHILIZED, FOR SOLUTION INTRAVENOUS at 18:28

## 2025-07-12 RX ADMIN — MORPHINE SULFATE 2 MG: 2 INJECTION, SOLUTION INTRAMUSCULAR; INTRAVENOUS at 13:35

## 2025-07-12 RX ADMIN — MIRTAZAPINE 45 MG: 15 TABLET, FILM COATED ORAL at 22:10

## 2025-07-12 RX ADMIN — PREGABALIN 100 MG: 50 CAPSULE ORAL at 14:10

## 2025-07-12 RX ADMIN — DICYCLOMINE HYDROCHLORIDE 20 MG: 10 CAPSULE ORAL at 17:27

## 2025-07-12 RX ADMIN — SODIUM CHLORIDE, PRESERVATIVE FREE 10 ML: 5 INJECTION INTRAVENOUS at 08:51

## 2025-07-12 RX ADMIN — SODIUM CHLORIDE, PRESERVATIVE FREE 10 ML: 5 INJECTION INTRAVENOUS at 08:36

## 2025-07-12 RX ADMIN — DICYCLOMINE HYDROCHLORIDE 10 MG: 10 CAPSULE ORAL at 10:46

## 2025-07-12 RX ADMIN — PANTOPRAZOLE SODIUM 40 MG: 40 TABLET, DELAYED RELEASE ORAL at 05:10

## 2025-07-12 RX ADMIN — SODIUM CHLORIDE 1500 MG: 0.9 INJECTION, SOLUTION INTRAVENOUS at 08:51

## 2025-07-12 RX ADMIN — ARIPIPRAZOLE 5 MG: 5 TABLET ORAL at 08:36

## 2025-07-12 RX ADMIN — PIPERACILLIN AND TAZOBACTAM 3375 MG: 3; .375 INJECTION, POWDER, LYOPHILIZED, FOR SOLUTION INTRAVENOUS at 02:51

## 2025-07-12 RX ADMIN — SODIUM CHLORIDE 250 ML: 0.9 INJECTION, SOLUTION INTRAVENOUS at 12:08

## 2025-07-12 RX ADMIN — POLYETHYLENE GLYCOL 3350 17 G: 17 POWDER, FOR SOLUTION ORAL at 22:13

## 2025-07-12 RX ADMIN — PIPERACILLIN AND TAZOBACTAM 3375 MG: 3; .375 INJECTION, POWDER, LYOPHILIZED, FOR SOLUTION INTRAVENOUS at 10:45

## 2025-07-12 RX ADMIN — METRONIDAZOLE 500 MG: 500 TABLET ORAL at 22:10

## 2025-07-12 RX ADMIN — PREGABALIN 100 MG: 50 CAPSULE ORAL at 08:35

## 2025-07-12 RX ADMIN — DICYCLOMINE HYDROCHLORIDE 10 MG: 10 CAPSULE ORAL at 05:10

## 2025-07-12 RX ADMIN — Medication 400 MG: at 08:36

## 2025-07-12 ASSESSMENT — PAIN DESCRIPTION - ONSET
ONSET: ON-GOING

## 2025-07-12 ASSESSMENT — PAIN DESCRIPTION - FREQUENCY
FREQUENCY: CONTINUOUS

## 2025-07-12 ASSESSMENT — PAIN SCALES - GENERAL
PAINLEVEL_OUTOF10: 10
PAINLEVEL_OUTOF10: 9
PAINLEVEL_OUTOF10: 10

## 2025-07-12 ASSESSMENT — PAIN DESCRIPTION - ORIENTATION
ORIENTATION: MID

## 2025-07-12 ASSESSMENT — PAIN DESCRIPTION - PAIN TYPE
TYPE: CHRONIC PAIN
TYPE: CHRONIC PAIN;SURGICAL PAIN
TYPE: CHRONIC PAIN;SURGICAL PAIN

## 2025-07-12 ASSESSMENT — PAIN - FUNCTIONAL ASSESSMENT
PAIN_FUNCTIONAL_ASSESSMENT: ACTIVITIES ARE NOT PREVENTED

## 2025-07-12 ASSESSMENT — PAIN DESCRIPTION - LOCATION
LOCATION: ABDOMEN;RECTUM

## 2025-07-12 ASSESSMENT — PAIN DESCRIPTION - DESCRIPTORS
DESCRIPTORS: ACHING;DISCOMFORT;TENDER;SORE
DESCRIPTORS: DISCOMFORT;SORE;SHARP
DESCRIPTORS: DISCOMFORT;SHARP;SORE
DESCRIPTORS: DISCOMFORT;PRESSURE
DESCRIPTORS: SHARP;DISCOMFORT;SORE
DESCRIPTORS: ACHING;DISCOMFORT
DESCRIPTORS: DISCOMFORT;SHARP;SORE

## 2025-07-12 NOTE — PLAN OF CARE
Problem: Discharge Planning  Goal: Discharge to home or other facility with appropriate resources  7/12/2025 1439 by Ron Buchanan, RN  Outcome: Progressing  Flowsheets (Taken 7/12/2025 1439)  Discharge to home or other facility with appropriate resources:   Identify barriers to discharge with patient and caregiver   Arrange for needed discharge resources and transportation as appropriate   Identify discharge learning needs (meds, wound care, etc)   Arrange for interpreters to assist at discharge as needed   Refer to discharge planning if patient needs post-hospital services based on physician order or complex needs related to functional status, cognitive ability or social support system     Problem: Pain  Goal: Verbalizes/displays adequate comfort level or baseline comfort level  7/12/2025 1439 by Ron Buchanan, RN  Outcome: Progressing  Flowsheets (Taken 7/12/2025 1439)  Verbalizes/displays adequate comfort level or baseline comfort level:   Encourage patient to monitor pain and request assistance   Assess pain using appropriate pain scale   Administer analgesics based on type and severity of pain and evaluate response   Implement non-pharmacological measures as appropriate and evaluate response   Consider cultural and social influences on pain and pain management   Notify Licensed Independent Practitioner if interventions unsuccessful or patient reports new pain     Problem: Nutrition Deficit:  Goal: Optimize nutritional status  7/12/2025 1439 by Ron Buchanan, RN  Outcome: Progressing  Flowsheets (Taken 7/12/2025 1439)  Nutrient intake appropriate for improving, restoring, or maintaining nutritional needs:   Assess nutritional status and recommend course of action   Monitor oral intake, labs, and treatment plans   Recommend appropriate diets, oral nutritional supplements, and vitamin/mineral supplements   Provide specific nutrition education to patient or family as appropriate     Problem: Skin/Tissue

## 2025-07-12 NOTE — VIRTUAL HEALTH
Barbra Cortés  [id number]  2000     INPATIENT TELEPSYCHIATRY EVALUATION    07/08/25    Chief Complaint:  “I have Chron's”    HPI: Patient is a 25 y.o. male who presented to the ED on 07/08/25 from [facility] for \"abnormal labs\" and abdominal pain. He was found to be septic, hyponatremic with ARF and have a small bowel obstruction and was admitted for management of such.  A telepsych consult was requested for: \"mood disorder - previously followed at  during last admission\". Per review of available documentation, there is limited psych history, but patient is currently being tapered off of duloxetine and mirtazapine is being uptitrated with previous intermittently recurrent scripts for fentanyl, oxycodone, and gabapentin per review of PDMP.    Per Social History:  \"Pt from [facility] however per CM \" (of [facility]) spoke with patients Aunt and explained they cannot take patient back as he refuses all care. They have been by to  his belongings since that discussion. I called patients Aunt Dimitri who stated [facility] never told her that patient couldn't return but she did go and  his staff as she was afraid they would lose it. I informed her that [facility] is not agreeable to taking patient back unless he is under hospice care (which they are not ready for). Quynh states patient is originally from home, and she's okay with him returning home if he's able to get up and care for himself; as she has a \"special needs\" daughter and \"handicapped\" nephew that she cares for. Before patient went to the hospital at the beginning of the year, he was able to ambulate independently (stayed in the basement) and has been changing his own colostomy for the last 3-4 years. She has no DME for him at home.\" Pt answers limited questions about PLOF however reports he was able to get dressed and bathe on his own at [facility]. Says he was participating in PT/OT but does not say what 
. Urinary bladder moderately distended. Bowel: The colon has been resected with right lower quadrant ileostomy place and there is free fluid in the abdominal cavity and fluid filling some loops of bowel. Abdominal wall: Right lower quadrant ileostomy without signs of any obstruction grossly Peritoneum/retroperitoneum: No fluid collections are seen. No free air. Vasculature: No aneurysm. Lymph nodes: No lymphadenopathy is appreciated. Osseous structures: No suspicious abnormality. There is edema in the subcutaneous and dermal soft tissues of the gluteal regions inferiorly along the gluteal crease. There appears to be a sacral decubitus ulcer at the inferior margin of the sacrum and some small gas bubbles. No identifiable drainable  abscess cavity is noted. There is some inflammation and skin thickening in the perineum, limited assessment without any IV contrast, certainly any workup for abscess should include IV contrast     1. There is edema in the subcutaneous and dermal soft tissues of the gluteal regions inferiorly along the gluteal crease. There appears to be a sacral decubitus ulcer at the inferior margin of the sacrum and some small gas bubbles. No identifiable drainable abscess cavity is noted. There is some inflammation and skin thickening in the perineum, limited assessment without any IV contrast, certainly any workup for abscess should include IV contrast 2. Some free fluid noted in the peritoneal cavity along the margin of the liver and small bowel in the right lower quadrant. Ileostomy noted right lower quadrant without signs of any obstruction. Subtotal or total colectomy noted 3. Calyceal calculi throughout the mid and inferior posterior right kidney 4. Cholelithiasis again noted with stones layering in the dependent portion of the gallbladder 5. Small right-sided pleural effusion is new compared to prior study. Electronically signed by Compa Swartz, DO    CT ABDOMEN PELVIS W IV CONTRAST 
gabapentin per review of PDMP.     Per Social History:  \"Pt from [facility] however per CM \" (of [facility]) spoke with patients Aunt and explained they cannot take patient back as he refuses all care. They have been by to  his belongings since that discussion. I called patients Aunt Dimitri who stated [facility] never told her that patient couldn't return but she did go and  his staff as she was afraid they would lose it. I informed her that [facility] is not agreeable to taking patient back unless he is under hospice care (which they are not ready for). Quynh states patient is originally from home, and she's okay with him returning home if he's able to get up and care for himself; as she has a \"special needs\" daughter and \"handicapped\" nephew that she cares for. Before patient went to the hospital at the beginning of the year, he was able to ambulate independently (stayed in the basement) and has been changing his own colostomy for the last 3-4 years. She has no DME for him at home.\" Pt answers limited questions about PLOF however reports he was able to get dressed and bathe on his own at [facility]. Says he was participating in PT/OT but does not say what they were doing \"it changed from day to day\" \" I can't rememeber\". Pt says it has been \"a long time\" since he got out of bed or walked\"     The patient presents with amotivation, apathy, anhedonia, intermittent irritability and demonstrates passive and dependent traits.  He would benefit from structured environment and schedule with consistent behavioral activation plan.  Per review of medication administration record, he is selectively adherent to medications and treatments.     We discussed evidence-based treatment options.  Per review of available documentation, he is currently being cross tapered from duloxetine to mirtazapine.  Duloxetine should be discontinued at this time, and mirtazapine increased to 30 mg po HS.

## 2025-07-12 NOTE — PLAN OF CARE
Problem: Discharge Planning  Goal: Discharge to home or other facility with appropriate resources  Outcome: Progressing  Flowsheets (Taken 7/11/2025 1344 by Kelsi Alvarenga, RN)  Discharge to home or other facility with appropriate resources:   Identify barriers to discharge with patient and caregiver   Arrange for needed discharge resources and transportation as appropriate   Identify discharge learning needs (meds, wound care, etc)     Problem: Pain  Goal: Verbalizes/displays adequate comfort level or baseline comfort level  Outcome: Progressing  Flowsheets (Taken 7/11/2025 1344 by Kelsi Alvarenga, RN)  Verbalizes/displays adequate comfort level or baseline comfort level:   Encourage patient to monitor pain and request assistance   Assess pain using appropriate pain scale   Administer analgesics based on type and severity of pain and evaluate response   Implement non-pharmacological measures as appropriate and evaluate response     Problem: Nutrition Deficit:  Goal: Optimize nutritional status  Outcome: Progressing  Flowsheets (Taken 7/10/2025 0956 by Ron Buchanan, RN)  Nutrient intake appropriate for improving, restoring, or maintaining nutritional needs:   Monitor oral intake, labs, and treatment plans   Assess nutritional status and recommend course of action   Recommend appropriate diets, oral nutritional supplements, and vitamin/mineral supplements   Provide specific nutrition education to patient or family as appropriate     Problem: Skin/Tissue Integrity  Goal: Skin integrity remains intact  Description: 1.  Monitor for areas of redness and/or skin breakdown  2.  Assess vascular access sites hourly  3.  Every 4-6 hours minimum:  Change oxygen saturation probe site  4.  Every 4-6 hours:  If on nasal continuous positive airway pressure, respiratory therapy assess nares and determine need for appliance change or resting period  Flowsheets (Taken 7/10/2025 0956 by Ron Buchanan, RN)  Skin Integrity

## 2025-07-13 LAB
ANION GAP SERPL CALCULATED.3IONS-SCNC: 14 MMOL/L (ref 3–16)
BASOPHILS # BLD: 0 K/UL (ref 0–0.2)
BASOPHILS NFR BLD: 0.2 %
BUN SERPL-MCNC: 8 MG/DL (ref 7–20)
CALCIUM SERPL-MCNC: 8.2 MG/DL (ref 8.3–10.6)
CHLORIDE SERPL-SCNC: 103 MMOL/L (ref 99–110)
CO2 SERPL-SCNC: 24 MMOL/L (ref 21–32)
CREAT SERPL-MCNC: 1.3 MG/DL (ref 0.9–1.3)
DEPRECATED RDW RBC AUTO: 18 % (ref 12.4–15.4)
EOSINOPHIL # BLD: 0.1 K/UL (ref 0–0.6)
EOSINOPHIL NFR BLD: 0.3 %
GFR SERPLBLD CREATININE-BSD FMLA CKD-EPI: 79 ML/MIN/{1.73_M2}
GLUCOSE SERPL-MCNC: 109 MG/DL (ref 70–99)
HCT VFR BLD AUTO: 25.4 % (ref 40.5–52.5)
HGB BLD-MCNC: 8.1 G/DL (ref 13.5–17.5)
LYMPHOCYTES # BLD: 1.2 K/UL (ref 1–5.1)
LYMPHOCYTES NFR BLD: 7.2 %
MCH RBC QN AUTO: 24.6 PG (ref 26–34)
MCHC RBC AUTO-ENTMCNC: 31.7 G/DL (ref 31–36)
MCV RBC AUTO: 77.6 FL (ref 80–100)
MONOCYTES # BLD: 0.7 K/UL (ref 0–1.3)
MONOCYTES NFR BLD: 4.1 %
NEUTROPHILS # BLD: 14.7 K/UL (ref 1.7–7.7)
NEUTROPHILS NFR BLD: 88.2 %
PLATELET # BLD AUTO: 333 K/UL (ref 135–450)
PMV BLD AUTO: 6.9 FL (ref 5–10.5)
POTASSIUM SERPL-SCNC: 3.5 MMOL/L (ref 3.5–5.1)
RBC # BLD AUTO: 3.28 M/UL (ref 4.2–5.9)
SODIUM SERPL-SCNC: 141 MMOL/L (ref 136–145)
VANCOMYCIN SERPL-MCNC: 27.7 UG/ML
WBC # BLD AUTO: 16.6 K/UL (ref 4–11)

## 2025-07-13 PROCEDURE — 2580000003 HC RX 258: Performed by: FAMILY MEDICINE

## 2025-07-13 PROCEDURE — 85025 COMPLETE CBC W/AUTO DIFF WBC: CPT

## 2025-07-13 PROCEDURE — 6360000002 HC RX W HCPCS: Performed by: FAMILY MEDICINE

## 2025-07-13 PROCEDURE — 2500000003 HC RX 250 WO HCPCS: Performed by: INTERNAL MEDICINE

## 2025-07-13 PROCEDURE — 6360000002 HC RX W HCPCS: Performed by: NURSE PRACTITIONER

## 2025-07-13 PROCEDURE — 6360000002 HC RX W HCPCS: Performed by: INTERNAL MEDICINE

## 2025-07-13 PROCEDURE — 87040 BLOOD CULTURE FOR BACTERIA: CPT

## 2025-07-13 PROCEDURE — 6370000000 HC RX 637 (ALT 250 FOR IP): Performed by: NURSE PRACTITIONER

## 2025-07-13 PROCEDURE — 80202 ASSAY OF VANCOMYCIN: CPT

## 2025-07-13 PROCEDURE — 36415 COLL VENOUS BLD VENIPUNCTURE: CPT

## 2025-07-13 PROCEDURE — 6370000000 HC RX 637 (ALT 250 FOR IP): Performed by: FAMILY MEDICINE

## 2025-07-13 PROCEDURE — 2580000003 HC RX 258: Performed by: INTERNAL MEDICINE

## 2025-07-13 PROCEDURE — 80048 BASIC METABOLIC PNL TOTAL CA: CPT

## 2025-07-13 PROCEDURE — 2060000000 HC ICU INTERMEDIATE R&B

## 2025-07-13 RX ORDER — METOPROLOL TARTRATE 1 MG/ML
2.5 INJECTION, SOLUTION INTRAVENOUS EVERY 6 HOURS PRN
Status: DISCONTINUED | OUTPATIENT
Start: 2025-07-13 | End: 2025-07-15 | Stop reason: HOSPADM

## 2025-07-13 RX ORDER — SODIUM CHLORIDE 9 MG/ML
INJECTION, SOLUTION INTRAVENOUS CONTINUOUS
Status: ACTIVE | OUTPATIENT
Start: 2025-07-13 | End: 2025-07-13

## 2025-07-13 RX ORDER — ACETAMINOPHEN 325 MG/1
650 TABLET ORAL EVERY 4 HOURS PRN
Status: DISCONTINUED | OUTPATIENT
Start: 2025-07-13 | End: 2025-07-15 | Stop reason: HOSPADM

## 2025-07-13 RX ORDER — OXYCODONE HYDROCHLORIDE 5 MG/1
10 TABLET ORAL
Refills: 0 | Status: DISCONTINUED | OUTPATIENT
Start: 2025-07-13 | End: 2025-07-15 | Stop reason: HOSPADM

## 2025-07-13 RX ORDER — OXYCODONE HYDROCHLORIDE 5 MG/1
5 TABLET ORAL
Refills: 0 | Status: DISCONTINUED | OUTPATIENT
Start: 2025-07-13 | End: 2025-07-15 | Stop reason: HOSPADM

## 2025-07-13 RX ORDER — MORPHINE SULFATE 2 MG/ML
1 INJECTION, SOLUTION INTRAMUSCULAR; INTRAVENOUS ONCE
Status: COMPLETED | OUTPATIENT
Start: 2025-07-13 | End: 2025-07-13

## 2025-07-13 RX ORDER — METOPROLOL TARTRATE 1 MG/ML
5 INJECTION, SOLUTION INTRAVENOUS
Status: DISCONTINUED | OUTPATIENT
Start: 2025-07-13 | End: 2025-07-13

## 2025-07-13 RX ORDER — METOPROLOL SUCCINATE 50 MG/1
50 TABLET, EXTENDED RELEASE ORAL 2 TIMES DAILY
Status: DISCONTINUED | OUTPATIENT
Start: 2025-07-13 | End: 2025-07-14

## 2025-07-13 RX ADMIN — METOPROLOL TARTRATE 5 MG: 5 INJECTION INTRAVENOUS at 00:42

## 2025-07-13 RX ADMIN — PIPERACILLIN AND TAZOBACTAM 3375 MG: 3; .375 INJECTION, POWDER, LYOPHILIZED, FOR SOLUTION INTRAVENOUS at 12:22

## 2025-07-13 RX ADMIN — PREGABALIN 100 MG: 50 CAPSULE ORAL at 08:41

## 2025-07-13 RX ADMIN — PIPERACILLIN AND TAZOBACTAM 3375 MG: 3; .375 INJECTION, POWDER, LYOPHILIZED, FOR SOLUTION INTRAVENOUS at 03:12

## 2025-07-13 RX ADMIN — ACETAMINOPHEN 650 MG: 325 TABLET ORAL at 23:27

## 2025-07-13 RX ADMIN — PREGABALIN 100 MG: 50 CAPSULE ORAL at 20:12

## 2025-07-13 RX ADMIN — METOPROLOL TARTRATE 2.5 MG: 5 INJECTION INTRAVENOUS at 09:33

## 2025-07-13 RX ADMIN — METOPROLOL SUCCINATE 50 MG: 100 TABLET, EXTENDED RELEASE ORAL at 20:12

## 2025-07-13 RX ADMIN — SODIUM CHLORIDE, PRESERVATIVE FREE 10 ML: 5 INJECTION INTRAVENOUS at 08:44

## 2025-07-13 RX ADMIN — Medication 400 MG: at 20:12

## 2025-07-13 RX ADMIN — METOPROLOL SUCCINATE 50 MG: 100 TABLET, EXTENDED RELEASE ORAL at 08:45

## 2025-07-13 RX ADMIN — DICYCLOMINE HYDROCHLORIDE 20 MG: 10 CAPSULE ORAL at 06:42

## 2025-07-13 RX ADMIN — PANTOPRAZOLE SODIUM 40 MG: 40 TABLET, DELAYED RELEASE ORAL at 06:42

## 2025-07-13 RX ADMIN — MORPHINE SULFATE 2 MG: 2 INJECTION, SOLUTION INTRAMUSCULAR; INTRAVENOUS at 23:10

## 2025-07-13 RX ADMIN — ARIPIPRAZOLE 10 MG: 5 TABLET ORAL at 08:41

## 2025-07-13 RX ADMIN — MORPHINE SULFATE 1 MG: 2 INJECTION, SOLUTION INTRAMUSCULAR; INTRAVENOUS at 23:39

## 2025-07-13 RX ADMIN — SODIUM CHLORIDE, PRESERVATIVE FREE 10 ML: 5 INJECTION INTRAVENOUS at 08:42

## 2025-07-13 RX ADMIN — MORPHINE SULFATE 2 MG: 2 INJECTION, SOLUTION INTRAMUSCULAR; INTRAVENOUS at 20:11

## 2025-07-13 RX ADMIN — DICYCLOMINE HYDROCHLORIDE 20 MG: 10 CAPSULE ORAL at 12:22

## 2025-07-13 RX ADMIN — MORPHINE SULFATE 2 MG: 2 INJECTION, SOLUTION INTRAMUSCULAR; INTRAVENOUS at 10:19

## 2025-07-13 RX ADMIN — MORPHINE SULFATE 2 MG: 2 INJECTION, SOLUTION INTRAMUSCULAR; INTRAVENOUS at 04:30

## 2025-07-13 RX ADMIN — SODIUM CHLORIDE: 0.9 INJECTION, SOLUTION INTRAVENOUS at 12:24

## 2025-07-13 RX ADMIN — PIPERACILLIN AND TAZOBACTAM 3375 MG: 3; .375 INJECTION, POWDER, LYOPHILIZED, FOR SOLUTION INTRAVENOUS at 18:48

## 2025-07-13 RX ADMIN — MIRTAZAPINE 45 MG: 15 TABLET, FILM COATED ORAL at 20:11

## 2025-07-13 RX ADMIN — Medication 400 MG: at 08:41

## 2025-07-13 RX ADMIN — DICYCLOMINE HYDROCHLORIDE 20 MG: 10 CAPSULE ORAL at 20:11

## 2025-07-13 RX ADMIN — ACETAMINOPHEN 500 MG: 500 TABLET ORAL at 08:41

## 2025-07-13 RX ADMIN — POTASSIUM CHLORIDE 40 MEQ: 1500 TABLET, EXTENDED RELEASE ORAL at 08:41

## 2025-07-13 RX ADMIN — METOPROLOL TARTRATE 2.5 MG: 5 INJECTION INTRAVENOUS at 18:46

## 2025-07-13 RX ADMIN — DICYCLOMINE HYDROCHLORIDE 20 MG: 10 CAPSULE ORAL at 17:41

## 2025-07-13 ASSESSMENT — PAIN DESCRIPTION - FREQUENCY
FREQUENCY: CONTINUOUS

## 2025-07-13 ASSESSMENT — PAIN DESCRIPTION - ORIENTATION
ORIENTATION: MID
ORIENTATION: MID;POSTERIOR

## 2025-07-13 ASSESSMENT — PAIN DESCRIPTION - DESCRIPTORS
DESCRIPTORS: ACHING
DESCRIPTORS: ACHING;DISCOMFORT
DESCRIPTORS: ACHING;DISCOMFORT
DESCRIPTORS: ACHING
DESCRIPTORS: ACHING
DESCRIPTORS: ACHING;DISCOMFORT;PRESSURE
DESCRIPTORS: ACHING;DISCOMFORT;SHARP

## 2025-07-13 ASSESSMENT — PAIN SCALES - GENERAL
PAINLEVEL_OUTOF10: 10
PAINLEVEL_OUTOF10: 9
PAINLEVEL_OUTOF10: 10

## 2025-07-13 ASSESSMENT — PAIN DESCRIPTION - ONSET
ONSET: ON-GOING

## 2025-07-13 ASSESSMENT — PAIN DESCRIPTION - LOCATION
LOCATION: ABDOMEN;RECTUM
LOCATION: RECTUM
LOCATION: ABDOMEN;RECTUM
LOCATION: BUTTOCKS;RECTUM
LOCATION: RECTUM
LOCATION: ABDOMEN;RECTUM
LOCATION: RECTUM

## 2025-07-13 ASSESSMENT — PAIN DESCRIPTION - PAIN TYPE
TYPE: CHRONIC PAIN;SURGICAL PAIN
TYPE: CHRONIC PAIN

## 2025-07-13 ASSESSMENT — PAIN - FUNCTIONAL ASSESSMENT
PAIN_FUNCTIONAL_ASSESSMENT: PREVENTS OR INTERFERES SOME ACTIVE ACTIVITIES AND ADLS
PAIN_FUNCTIONAL_ASSESSMENT: ACTIVITIES ARE NOT PREVENTED
PAIN_FUNCTIONAL_ASSESSMENT: PREVENTS OR INTERFERES SOME ACTIVE ACTIVITIES AND ADLS
PAIN_FUNCTIONAL_ASSESSMENT: PREVENTS OR INTERFERES SOME ACTIVE ACTIVITIES AND ADLS
PAIN_FUNCTIONAL_ASSESSMENT: ACTIVITIES ARE NOT PREVENTED

## 2025-07-13 NOTE — PLAN OF CARE
Problem: Discharge Planning  Goal: Discharge to home or other facility with appropriate resources  7/13/2025 0236 by Cachorro Camarena RN  Outcome: Progressing  Flowsheets (Taken 7/12/2025 1439 by Ron Buchanan, RN)  Discharge to home or other facility with appropriate resources:   Identify barriers to discharge with patient and caregiver   Arrange for needed discharge resources and transportation as appropriate   Identify discharge learning needs (meds, wound care, etc)   Arrange for interpreters to assist at discharge as needed   Refer to discharge planning if patient needs post-hospital services based on physician order or complex needs related to functional status, cognitive ability or social support system     Problem: Pain  Goal: Verbalizes/displays adequate comfort level or baseline comfort level  7/13/2025 0236 by Cachorro Camarena RN  Flowsheets (Taken 7/12/2025 1439 by Ron Buchanan, RN)  Verbalizes/displays adequate comfort level or baseline comfort level:   Encourage patient to monitor pain and request assistance   Assess pain using appropriate pain scale   Administer analgesics based on type and severity of pain and evaluate response   Implement non-pharmacological measures as appropriate and evaluate response   Consider cultural and social influences on pain and pain management   Notify Licensed Independent Practitioner if interventions unsuccessful or patient reports new pain     Problem: Nutrition Deficit:  Goal: Optimize nutritional status  7/12/2025 1439 by Ron Buchanan, RN  Outcome: Progressing  Flowsheets (Taken 7/12/2025 1439)  Nutrient intake appropriate for improving, restoring, or maintaining nutritional needs:   Assess nutritional status and recommend course of action   Monitor oral intake, labs, and treatment plans   Recommend appropriate diets, oral nutritional supplements, and vitamin/mineral supplements   Provide specific nutrition education to patient or family as

## 2025-07-13 NOTE — PLAN OF CARE
Problem: Discharge Planning  Goal: Discharge to home or other facility with appropriate resources  7/13/2025 1326 by Ron Buchanan, RN  Outcome: Progressing  Flowsheets (Taken 7/13/2025 1326)  Discharge to home or other facility with appropriate resources:   Identify barriers to discharge with patient and caregiver   Arrange for needed discharge resources and transportation as appropriate   Identify discharge learning needs (meds, wound care, etc)   Arrange for interpreters to assist at discharge as needed   Refer to discharge planning if patient needs post-hospital services based on physician order or complex needs related to functional status, cognitive ability or social support system     Problem: Pain  Goal: Verbalizes/displays adequate comfort level or baseline comfort level  7/13/2025 1326 by Ron Buchanan RN  Outcome: Progressing  Flowsheets (Taken 7/13/2025 1326)  Verbalizes/displays adequate comfort level or baseline comfort level:   Encourage patient to monitor pain and request assistance   Assess pain using appropriate pain scale   Administer analgesics based on type and severity of pain and evaluate response   Implement non-pharmacological measures as appropriate and evaluate response   Consider cultural and social influences on pain and pain management   Notify Licensed Independent Practitioner if interventions unsuccessful or patient reports new pain     Problem: Nutrition Deficit:  Goal: Optimize nutritional status  Outcome: Progressing  Flowsheets (Taken 7/13/2025 1326)  Nutrient intake appropriate for improving, restoring, or maintaining nutritional needs:   Assess nutritional status and recommend course of action   Monitor oral intake, labs, and treatment plans   Recommend appropriate diets, oral nutritional supplements, and vitamin/mineral supplements   Provide specific nutrition education to patient or family as appropriate     Problem: Skin/Tissue Integrity  Goal: Skin integrity remains

## 2025-07-14 LAB
ANION GAP SERPL CALCULATED.3IONS-SCNC: 15 MMOL/L (ref 3–16)
BACTERIA URNS QL MICRO: ABNORMAL /HPF
BASOPHILS # BLD: 0.1 K/UL (ref 0–0.2)
BASOPHILS NFR BLD: 0.7 %
BILIRUB UR QL STRIP.AUTO: NEGATIVE
BUN SERPL-MCNC: 6 MG/DL (ref 7–20)
CALCIUM SERPL-MCNC: 8.5 MG/DL (ref 8.3–10.6)
CHLORIDE SERPL-SCNC: 102 MMOL/L (ref 99–110)
CLARITY UR: CLEAR
CO2 SERPL-SCNC: 23 MMOL/L (ref 21–32)
COLOR UR: YELLOW
CREAT SERPL-MCNC: 1.3 MG/DL (ref 0.9–1.3)
CRP SERPL-MCNC: 163 MG/L (ref 0–5.1)
DEPRECATED RDW RBC AUTO: 17.9 % (ref 12.4–15.4)
EOSINOPHIL # BLD: 0.1 K/UL (ref 0–0.6)
EOSINOPHIL NFR BLD: 0.9 %
GFR SERPLBLD CREATININE-BSD FMLA CKD-EPI: 79 ML/MIN/{1.73_M2}
GLUCOSE SERPL-MCNC: 100 MG/DL (ref 70–99)
GLUCOSE UR STRIP.AUTO-MCNC: NEGATIVE MG/DL
HCT VFR BLD AUTO: 23.8 % (ref 40.5–52.5)
HGB BLD-MCNC: 7.5 G/DL (ref 13.5–17.5)
HGB UR QL STRIP.AUTO: ABNORMAL
KETONES UR STRIP.AUTO-MCNC: NEGATIVE MG/DL
LEUKOCYTE ESTERASE UR QL STRIP.AUTO: ABNORMAL
LYMPHOCYTES # BLD: 2 K/UL (ref 1–5.1)
LYMPHOCYTES NFR BLD: 13.5 %
MAGNESIUM SERPL-MCNC: 1.19 MG/DL (ref 1.8–2.4)
MAGNESIUM SERPL-MCNC: 2.85 MG/DL (ref 1.8–2.4)
MCH RBC QN AUTO: 24.8 PG (ref 26–34)
MCHC RBC AUTO-ENTMCNC: 31.6 G/DL (ref 31–36)
MCV RBC AUTO: 78.5 FL (ref 80–100)
MONOCYTES # BLD: 0.7 K/UL (ref 0–1.3)
MONOCYTES NFR BLD: 4.7 %
NEUTROPHILS # BLD: 11.8 K/UL (ref 1.7–7.7)
NEUTROPHILS NFR BLD: 80.2 %
NITRITE UR QL STRIP.AUTO: NEGATIVE
NT-PROBNP SERPL-MCNC: 1973 PG/ML (ref 0–124)
PH UR STRIP.AUTO: 6 [PH] (ref 5–8)
PHOSPHATE SERPL-MCNC: 3.9 MG/DL (ref 2.5–4.9)
PLATELET # BLD AUTO: 323 K/UL (ref 135–450)
PMV BLD AUTO: 7 FL (ref 5–10.5)
POTASSIUM SERPL-SCNC: 2.7 MMOL/L (ref 3.5–5.1)
POTASSIUM SERPL-SCNC: 3 MMOL/L (ref 3.5–5.1)
PROCALCITONIN SERPL IA-MCNC: 0.35 NG/ML (ref 0–0.15)
PROT UR STRIP.AUTO-MCNC: NEGATIVE MG/DL
RBC # BLD AUTO: 3.03 M/UL (ref 4.2–5.9)
RBC #/AREA URNS HPF: ABNORMAL /HPF (ref 0–4)
SODIUM SERPL-SCNC: 140 MMOL/L (ref 136–145)
SODIUM UR-SCNC: 30 MMOL/L
SP GR UR STRIP.AUTO: <=1.005 (ref 1–1.03)
TROPONIN, HIGH SENSITIVITY: 27 NG/L (ref 0–22)
UA DIPSTICK W REFLEX MICRO PNL UR: YES
URN SPEC COLLECT METH UR: ABNORMAL
UROBILINOGEN UR STRIP-ACNC: 0.2 E.U./DL
VANCOMYCIN SERPL-MCNC: 11.9 UG/ML
WBC # BLD AUTO: 14.7 K/UL (ref 4–11)
WBC #/AREA URNS HPF: ABNORMAL /HPF (ref 0–5)

## 2025-07-14 PROCEDURE — 80048 BASIC METABOLIC PNL TOTAL CA: CPT

## 2025-07-14 PROCEDURE — 86140 C-REACTIVE PROTEIN: CPT

## 2025-07-14 PROCEDURE — 99232 SBSQ HOSP IP/OBS MODERATE 35: CPT | Performed by: INTERNAL MEDICINE

## 2025-07-14 PROCEDURE — 6360000002 HC RX W HCPCS: Performed by: FAMILY MEDICINE

## 2025-07-14 PROCEDURE — 81001 URINALYSIS AUTO W/SCOPE: CPT

## 2025-07-14 PROCEDURE — 2580000003 HC RX 258: Performed by: HOSPITALIST

## 2025-07-14 PROCEDURE — 85025 COMPLETE CBC W/AUTO DIFF WBC: CPT

## 2025-07-14 PROCEDURE — 6370000000 HC RX 637 (ALT 250 FOR IP): Performed by: NURSE PRACTITIONER

## 2025-07-14 PROCEDURE — 6360000002 HC RX W HCPCS: Performed by: INTERNAL MEDICINE

## 2025-07-14 PROCEDURE — 84300 ASSAY OF URINE SODIUM: CPT

## 2025-07-14 PROCEDURE — 84132 ASSAY OF SERUM POTASSIUM: CPT

## 2025-07-14 PROCEDURE — 6360000002 HC RX W HCPCS: Performed by: HOSPITALIST

## 2025-07-14 PROCEDURE — 6360000002 HC RX W HCPCS: Performed by: NURSE PRACTITIONER

## 2025-07-14 PROCEDURE — 2060000000 HC ICU INTERMEDIATE R&B

## 2025-07-14 PROCEDURE — 6370000000 HC RX 637 (ALT 250 FOR IP): Performed by: FAMILY MEDICINE

## 2025-07-14 PROCEDURE — 83735 ASSAY OF MAGNESIUM: CPT

## 2025-07-14 PROCEDURE — 82570 ASSAY OF URINE CREATININE: CPT

## 2025-07-14 PROCEDURE — 2580000003 HC RX 258: Performed by: INTERNAL MEDICINE

## 2025-07-14 PROCEDURE — 36415 COLL VENOUS BLD VENIPUNCTURE: CPT

## 2025-07-14 PROCEDURE — 84145 PROCALCITONIN (PCT): CPT

## 2025-07-14 PROCEDURE — 83880 ASSAY OF NATRIURETIC PEPTIDE: CPT

## 2025-07-14 PROCEDURE — 6370000000 HC RX 637 (ALT 250 FOR IP): Performed by: HOSPITALIST

## 2025-07-14 PROCEDURE — 2580000003 HC RX 258: Performed by: NURSE PRACTITIONER

## 2025-07-14 PROCEDURE — 84484 ASSAY OF TROPONIN QUANT: CPT

## 2025-07-14 PROCEDURE — 80202 ASSAY OF VANCOMYCIN: CPT

## 2025-07-14 PROCEDURE — 84100 ASSAY OF PHOSPHORUS: CPT

## 2025-07-14 RX ORDER — MORPHINE SULFATE 2 MG/ML
1 INJECTION, SOLUTION INTRAMUSCULAR; INTRAVENOUS EVERY 6 HOURS PRN
Status: DISCONTINUED | OUTPATIENT
Start: 2025-07-14 | End: 2025-07-14

## 2025-07-14 RX ORDER — POTASSIUM CHLORIDE 7.45 MG/ML
10 INJECTION INTRAVENOUS
Status: COMPLETED | OUTPATIENT
Start: 2025-07-14 | End: 2025-07-14

## 2025-07-14 RX ORDER — METRONIDAZOLE 500 MG/100ML
500 INJECTION, SOLUTION INTRAVENOUS EVERY 8 HOURS
Status: DISCONTINUED | OUTPATIENT
Start: 2025-07-14 | End: 2025-07-15 | Stop reason: HOSPADM

## 2025-07-14 RX ORDER — MORPHINE SULFATE 2 MG/ML
2 INJECTION, SOLUTION INTRAMUSCULAR; INTRAVENOUS
Status: DISCONTINUED | OUTPATIENT
Start: 2025-07-14 | End: 2025-07-14

## 2025-07-14 RX ORDER — POTASSIUM CHLORIDE 1500 MG/1
40 TABLET, EXTENDED RELEASE ORAL 2 TIMES DAILY WITH MEALS
Status: DISCONTINUED | OUTPATIENT
Start: 2025-07-14 | End: 2025-07-15 | Stop reason: HOSPADM

## 2025-07-14 RX ORDER — METOPROLOL SUCCINATE 100 MG/1
100 TABLET, EXTENDED RELEASE ORAL 2 TIMES DAILY
Status: DISCONTINUED | OUTPATIENT
Start: 2025-07-14 | End: 2025-07-15 | Stop reason: HOSPADM

## 2025-07-14 RX ORDER — SODIUM CHLORIDE 9 MG/ML
INJECTION, SOLUTION INTRAVENOUS CONTINUOUS
Status: DISCONTINUED | OUTPATIENT
Start: 2025-07-14 | End: 2025-07-15 | Stop reason: HOSPADM

## 2025-07-14 RX ORDER — MORPHINE SULFATE 15 MG/1
15 TABLET, FILM COATED, EXTENDED RELEASE ORAL EVERY 12 HOURS SCHEDULED
Refills: 0 | Status: DISCONTINUED | OUTPATIENT
Start: 2025-07-14 | End: 2025-07-15 | Stop reason: HOSPADM

## 2025-07-14 RX ORDER — OXYCODONE HCL 20 MG/1
20 TABLET, FILM COATED, EXTENDED RELEASE ORAL EVERY 12 HOURS SCHEDULED
Refills: 0 | Status: DISCONTINUED | OUTPATIENT
Start: 2025-07-14 | End: 2025-07-14

## 2025-07-14 RX ORDER — POTASSIUM CHLORIDE 1500 MG/1
40 TABLET, EXTENDED RELEASE ORAL ONCE
Status: DISCONTINUED | OUTPATIENT
Start: 2025-07-14 | End: 2025-07-15 | Stop reason: HOSPADM

## 2025-07-14 RX ORDER — MAGNESIUM SULFATE IN WATER 40 MG/ML
4000 INJECTION, SOLUTION INTRAVENOUS ONCE
Status: COMPLETED | OUTPATIENT
Start: 2025-07-14 | End: 2025-07-14

## 2025-07-14 RX ADMIN — CEFEPIME 2000 MG: 2 INJECTION, POWDER, FOR SOLUTION INTRAVENOUS at 15:48

## 2025-07-14 RX ADMIN — Medication 400 MG: at 10:16

## 2025-07-14 RX ADMIN — MORPHINE SULFATE 1 MG: 2 INJECTION, SOLUTION INTRAMUSCULAR; INTRAVENOUS at 10:15

## 2025-07-14 RX ADMIN — DICYCLOMINE HYDROCHLORIDE 20 MG: 10 CAPSULE ORAL at 06:11

## 2025-07-14 RX ADMIN — POTASSIUM CHLORIDE 10 MEQ: 10 INJECTION, SOLUTION INTRAVENOUS at 10:45

## 2025-07-14 RX ADMIN — METOPROLOL SUCCINATE 75 MG: 50 TABLET, EXTENDED RELEASE ORAL at 10:16

## 2025-07-14 RX ADMIN — PIPERACILLIN AND TAZOBACTAM 3375 MG: 3; .375 INJECTION, POWDER, LYOPHILIZED, FOR SOLUTION INTRAVENOUS at 02:44

## 2025-07-14 RX ADMIN — METRONIDAZOLE 500 MG: 500 INJECTION, SOLUTION INTRAVENOUS at 13:06

## 2025-07-14 RX ADMIN — METOPROLOL TARTRATE 2.5 MG: 5 INJECTION INTRAVENOUS at 01:31

## 2025-07-14 RX ADMIN — VANCOMYCIN HYDROCHLORIDE 1000 MG: 1 INJECTION, POWDER, LYOPHILIZED, FOR SOLUTION INTRAVENOUS at 10:47

## 2025-07-14 RX ADMIN — ARIPIPRAZOLE 10 MG: 5 TABLET ORAL at 10:16

## 2025-07-14 RX ADMIN — MORPHINE SULFATE 2 MG: 2 INJECTION, SOLUTION INTRAMUSCULAR; INTRAVENOUS at 05:22

## 2025-07-14 RX ADMIN — POTASSIUM CHLORIDE 10 MEQ: 10 INJECTION, SOLUTION INTRAVENOUS at 12:52

## 2025-07-14 RX ADMIN — SODIUM CHLORIDE: 0.9 INJECTION, SOLUTION INTRAVENOUS at 10:40

## 2025-07-14 RX ADMIN — MORPHINE SULFATE 1 MG: 2 INJECTION, SOLUTION INTRAMUSCULAR; INTRAVENOUS at 16:14

## 2025-07-14 RX ADMIN — MORPHINE SULFATE 2 MG: 2 INJECTION, SOLUTION INTRAMUSCULAR; INTRAVENOUS at 02:35

## 2025-07-14 RX ADMIN — POTASSIUM CHLORIDE 10 MEQ: 10 INJECTION, SOLUTION INTRAVENOUS at 12:01

## 2025-07-14 RX ADMIN — DICYCLOMINE HYDROCHLORIDE 20 MG: 10 CAPSULE ORAL at 16:14

## 2025-07-14 RX ADMIN — PREGABALIN 100 MG: 50 CAPSULE ORAL at 15:46

## 2025-07-14 RX ADMIN — MAGNESIUM SULFATE HEPTAHYDRATE 4000 MG: 40 INJECTION, SOLUTION INTRAVENOUS at 10:56

## 2025-07-14 RX ADMIN — PANTOPRAZOLE SODIUM 40 MG: 40 TABLET, DELAYED RELEASE ORAL at 06:12

## 2025-07-14 RX ADMIN — PREGABALIN 100 MG: 50 CAPSULE ORAL at 10:16

## 2025-07-14 RX ADMIN — METRONIDAZOLE 500 MG: 500 INJECTION, SOLUTION INTRAVENOUS at 21:58

## 2025-07-14 RX ADMIN — POTASSIUM CHLORIDE 40 MEQ: 1500 TABLET, EXTENDED RELEASE ORAL at 16:14

## 2025-07-14 RX ADMIN — DICYCLOMINE HYDROCHLORIDE 20 MG: 10 CAPSULE ORAL at 12:06

## 2025-07-14 RX ADMIN — CEFEPIME 2000 MG: 2 INJECTION, POWDER, FOR SOLUTION INTRAVENOUS at 23:35

## 2025-07-14 ASSESSMENT — PAIN SCALES - GENERAL
PAINLEVEL_OUTOF10: 9
PAINLEVEL_OUTOF10: 10
PAINLEVEL_OUTOF10: 8
PAINLEVEL_OUTOF10: 9
PAINLEVEL_OUTOF10: 9
PAINLEVEL_OUTOF10: 4
PAINLEVEL_OUTOF10: 10
PAINLEVEL_OUTOF10: 7
PAINLEVEL_OUTOF10: 10
PAINLEVEL_OUTOF10: 4
PAINLEVEL_OUTOF10: 4
PAINLEVEL_OUTOF10: 10
PAINLEVEL_OUTOF10: 10
PAINLEVEL_OUTOF10: 7
PAINLEVEL_OUTOF10: 10
PAINLEVEL_OUTOF10: 3

## 2025-07-14 ASSESSMENT — PAIN DESCRIPTION - ONSET
ONSET: ON-GOING

## 2025-07-14 ASSESSMENT — PAIN DESCRIPTION - DESCRIPTORS
DESCRIPTORS: ACHING

## 2025-07-14 ASSESSMENT — PAIN DESCRIPTION - LOCATION
LOCATION: ABDOMEN
LOCATION: RECTUM
LOCATION: BUTTOCKS;RECTUM
LOCATION: COCCYX;RECTUM
LOCATION: BUTTOCKS;RECTUM
LOCATION: COCCYX
LOCATION: RECTUM
LOCATION: COCCYX

## 2025-07-14 ASSESSMENT — PAIN - FUNCTIONAL ASSESSMENT
PAIN_FUNCTIONAL_ASSESSMENT: ACTIVITIES ARE NOT PREVENTED
PAIN_FUNCTIONAL_ASSESSMENT: PREVENTS OR INTERFERES SOME ACTIVE ACTIVITIES AND ADLS
PAIN_FUNCTIONAL_ASSESSMENT: PREVENTS OR INTERFERES SOME ACTIVE ACTIVITIES AND ADLS
PAIN_FUNCTIONAL_ASSESSMENT: ACTIVITIES ARE NOT PREVENTED
PAIN_FUNCTIONAL_ASSESSMENT: PREVENTS OR INTERFERES SOME ACTIVE ACTIVITIES AND ADLS
PAIN_FUNCTIONAL_ASSESSMENT: ACTIVITIES ARE NOT PREVENTED
PAIN_FUNCTIONAL_ASSESSMENT: ACTIVITIES ARE NOT PREVENTED
PAIN_FUNCTIONAL_ASSESSMENT: PREVENTS OR INTERFERES SOME ACTIVE ACTIVITIES AND ADLS

## 2025-07-14 ASSESSMENT — PAIN DESCRIPTION - FREQUENCY
FREQUENCY: CONTINUOUS
FREQUENCY: INTERMITTENT
FREQUENCY: CONTINUOUS

## 2025-07-14 ASSESSMENT — PAIN DESCRIPTION - PAIN TYPE
TYPE: ACUTE PAIN
TYPE: CHRONIC PAIN
TYPE: CHRONIC PAIN
TYPE: ACUTE PAIN
TYPE: CHRONIC PAIN
TYPE: CHRONIC PAIN

## 2025-07-14 ASSESSMENT — PAIN DESCRIPTION - ORIENTATION
ORIENTATION: POSTERIOR
ORIENTATION: MID
ORIENTATION: MID;POSTERIOR
ORIENTATION: POSTERIOR

## 2025-07-14 NOTE — CARE COORDINATION
Discharge Planning:    CM spoke with admissions liaison, Criselda, at [facility] precert has been approved and is valid through 7/23. They are aware of ID's IV abx recs. Criselda requested we send pt to facility with extra ostomy supplies upon DC- CM will communicate this with nursing staff. Once medically stable and cleared for DC- CM will arrange for BLS transport to SNF. Attempted to call and update aunt/legal POA- no answer.     Thank you  Mitzi Wei RN, BSN,   ICU   [phone]

## 2025-07-14 NOTE — PLAN OF CARE
Problem: Pain  Goal: Verbalizes/displays adequate comfort level or baseline comfort level  7/14/2025 0253 by Iman Davila RN  Outcome: Not Progressing  Flowsheets (Taken 7/14/2025 0253)  Verbalizes/displays adequate comfort level or baseline comfort level:   Encourage patient to monitor pain and request assistance   Administer analgesics based on type and severity of pain and evaluate response   Assess pain using appropriate pain scale   Implement non-pharmacological measures as appropriate and evaluate response      Problem: Discharge Planning  Goal: Discharge to home or other facility with appropriate resources  7/14/2025 0253 by Iman Davila RN  Outcome: Progressing  Flowsheets (Taken 7/13/2025 2000)  Discharge to home or other facility with appropriate resources:   Identify barriers to discharge with patient and caregiver   Arrange for needed discharge resources and transportation as appropriate   Identify discharge learning needs (meds, wound care, etc)     Problem: Skin/Tissue Integrity  Goal: Skin integrity remains intact  Description: 1.  Monitor for areas of redness and/or skin breakdown  2.  Assess vascular access sites hourly  3.  Every 4-6 hours minimum:  Change oxygen saturation probe site  4.  Every 4-6 hours:  If on nasal continuous positive airway pressure, respiratory therapy assess nares and determine need for appliance change or resting period  7/14/2025 0253 by Iman Davila RN  Outcome: Progressing  Flowsheets (Taken 7/13/2025 2200)  Skin Integrity Remains Intact: Monitor for areas of redness and/or skin breakdown     Problem: Safety - Adult  Goal: Free from fall injury  7/14/2025 0253 by Iman Davila RN  Outcome: Progressing  Flowsheets (Taken 7/13/2025 2000)  Free From Fall Injury: Instruct family/caregiver on patient safety     Problem: Confusion  Goal: Confusion, delirium, dementia, or psychosis is improved or at baseline  Description: INTERVENTIONS:  1. Assess for possible

## 2025-07-14 NOTE — PLAN OF CARE
Problem: Pain  Goal: Verbalizes/displays adequate comfort level or baseline comfort level  7/14/2025 0253 by Iman Davila, RN  Outcome: Not Progressing  Flowsheets (Taken 7/14/2025 0253)  Verbalizes/displays adequate comfort level or baseline comfort level:   Encourage patient to monitor pain and request assistance   Administer analgesics based on type and severity of pain and evaluate response   Assess pain using appropriate pain scale   Implement non-pharmacological measures as appropriate and evaluate response   Patient resting comfortably in bed at this time.       Problem: Safety - Adult  Goal: Free from fall injury  7/14/2025 0920 by Nelly Tolliver, RN  Outcome: Progressing   Patient has remained free of falls. 2/4 bed rails up, bed locked and in lowest position, call light within reach. Patient instructed on use of call light and uses appropriately. Bed alarm on. Non-skid footwear and fall band on.

## 2025-07-14 NOTE — PLAN OF CARE
Problem: Discharge Planning  Goal: Discharge to home or other facility with appropriate resources  Outcome: Progressing  Flowsheets (Taken 7/14/2025 1941)  Discharge to home or other facility with appropriate resources:   Identify barriers to discharge with patient and caregiver   Arrange for needed discharge resources and transportation as appropriate   Identify discharge learning needs (meds, wound care, etc)   Refer to discharge planning if patient needs post-hospital services based on physician order or complex needs related to functional status, cognitive ability or social support system     Problem: Pain  Goal: Verbalizes/displays adequate comfort level or baseline comfort level  Outcome: Not Progressing  Flowsheets (Taken 7/14/2025 1941)  Verbalizes/displays adequate comfort level or baseline comfort level:   Encourage patient to monitor pain and request assistance   Assess pain using appropriate pain scale   Implement non-pharmacological measures as appropriate and evaluate response   Administer analgesics based on type and severity of pain and evaluate response   Consider cultural and social influences on pain and pain management   Notify Licensed Independent Practitioner if interventions unsuccessful or patient reports new pain     Problem: Nutrition Deficit:  Goal: Optimize nutritional status  Outcome: Progressing  Flowsheets (Taken 7/14/2025 1941)  Nutrient intake appropriate for improving, restoring, or maintaining nutritional needs:   Assess nutritional status and recommend course of action   Recommend appropriate diets, oral nutritional supplements, and vitamin/mineral supplements   Provide specific nutrition education to patient or family as appropriate   Order, calculate, and assess calorie counts as needed   Monitor oral intake, labs, and treatment plans     Problem: Skin/Tissue Integrity  Goal: Skin integrity remains intact  Description: 1.  Monitor for areas of redness and/or skin breakdown  2.

## 2025-07-15 ENCOUNTER — APPOINTMENT (OUTPATIENT)
Age: 25
DRG: 720 | End: 2025-07-15
Attending: HOSPITALIST
Payer: COMMERCIAL

## 2025-07-15 ENCOUNTER — TELEPHONE (OUTPATIENT)
Dept: INFECTIOUS DISEASES | Age: 25
End: 2025-07-15

## 2025-07-15 VITALS
OXYGEN SATURATION: 98 % | TEMPERATURE: 98.9 F | RESPIRATION RATE: 16 BRPM | WEIGHT: 133.16 LBS | BODY MASS INDEX: 16.22 KG/M2 | HEIGHT: 76 IN | SYSTOLIC BLOOD PRESSURE: 132 MMHG | HEART RATE: 124 BPM | DIASTOLIC BLOOD PRESSURE: 90 MMHG

## 2025-07-15 DIAGNOSIS — T14.8XXA WOUND INFECTION: Primary | ICD-10-CM

## 2025-07-15 DIAGNOSIS — L08.9 WOUND INFECTION: Primary | ICD-10-CM

## 2025-07-15 LAB
ALBUMIN SERPL-MCNC: 2.8 G/DL (ref 3.4–5)
ANION GAP SERPL CALCULATED.3IONS-SCNC: 11 MMOL/L (ref 3–16)
ANION GAP SERPL CALCULATED.3IONS-SCNC: 11 MMOL/L (ref 3–16)
BUN SERPL-MCNC: 4 MG/DL (ref 7–20)
BUN SERPL-MCNC: 4 MG/DL (ref 7–20)
CALCIUM SERPL-MCNC: 8.7 MG/DL (ref 8.3–10.6)
CALCIUM SERPL-MCNC: 8.8 MG/DL (ref 8.3–10.6)
CHLORIDE SERPL-SCNC: 109 MMOL/L (ref 99–110)
CHLORIDE SERPL-SCNC: 109 MMOL/L (ref 99–110)
CO2 SERPL-SCNC: 23 MMOL/L (ref 21–32)
CO2 SERPL-SCNC: 23 MMOL/L (ref 21–32)
CREAT SERPL-MCNC: 1.1 MG/DL (ref 0.9–1.3)
CREAT SERPL-MCNC: 1.1 MG/DL (ref 0.9–1.3)
GFR SERPLBLD CREATININE-BSD FMLA CKD-EPI: >90 ML/MIN/{1.73_M2}
GFR SERPLBLD CREATININE-BSD FMLA CKD-EPI: >90 ML/MIN/{1.73_M2}
GLUCOSE SERPL-MCNC: 106 MG/DL (ref 70–99)
GLUCOSE SERPL-MCNC: 108 MG/DL (ref 70–99)
PHOSPHATE SERPL-MCNC: 3.4 MG/DL (ref 2.5–4.9)
POTASSIUM SERPL-SCNC: 3 MMOL/L (ref 3.5–5.1)
POTASSIUM SERPL-SCNC: 3 MMOL/L (ref 3.5–5.1)
SODIUM SERPL-SCNC: 143 MMOL/L (ref 136–145)
SODIUM SERPL-SCNC: 143 MMOL/L (ref 136–145)
VANCOMYCIN SERPL-MCNC: 12.1 UG/ML

## 2025-07-15 PROCEDURE — 36415 COLL VENOUS BLD VENIPUNCTURE: CPT

## 2025-07-15 PROCEDURE — 99232 SBSQ HOSP IP/OBS MODERATE 35: CPT | Performed by: INTERNAL MEDICINE

## 2025-07-15 PROCEDURE — 97530 THERAPEUTIC ACTIVITIES: CPT

## 2025-07-15 PROCEDURE — 80069 RENAL FUNCTION PANEL: CPT

## 2025-07-15 PROCEDURE — 97110 THERAPEUTIC EXERCISES: CPT

## 2025-07-15 PROCEDURE — 6360000002 HC RX W HCPCS: Performed by: HOSPITALIST

## 2025-07-15 PROCEDURE — 80202 ASSAY OF VANCOMYCIN: CPT

## 2025-07-15 RX ORDER — METOPROLOL SUCCINATE 100 MG/1
100 TABLET, EXTENDED RELEASE ORAL 2 TIMES DAILY
Qty: 30 TABLET | Refills: 3 | Status: SHIPPED
Start: 2025-07-15

## 2025-07-15 RX ORDER — PREGABALIN 100 MG/1
100 CAPSULE ORAL 3 TIMES DAILY
Qty: 90 CAPSULE | Refills: 0 | Status: SHIPPED | OUTPATIENT
Start: 2025-07-15 | End: 2025-08-14

## 2025-07-15 RX ORDER — LANOLIN ALCOHOL/MO/W.PET/CERES
400 CREAM (GRAM) TOPICAL 2 TIMES DAILY
Qty: 30 TABLET | Refills: 0 | Status: SHIPPED | OUTPATIENT
Start: 2025-07-15

## 2025-07-15 RX ORDER — ERGOCALCIFEROL 1.25 MG/1
50000 CAPSULE ORAL WEEKLY
Qty: 5 CAPSULE | Refills: 0 | Status: SHIPPED | OUTPATIENT
Start: 2025-07-17

## 2025-07-15 RX ORDER — ARIPIPRAZOLE 10 MG/1
10 TABLET ORAL DAILY
Qty: 30 TABLET | Refills: 3 | Status: SHIPPED | OUTPATIENT
Start: 2025-07-16

## 2025-07-15 RX ORDER — POTASSIUM CHLORIDE 1.5 G/1.58G
20 POWDER, FOR SOLUTION ORAL 2 TIMES DAILY
Qty: 30 EACH | Refills: 3 | Status: SHIPPED | OUTPATIENT
Start: 2025-07-15

## 2025-07-15 RX ADMIN — METRONIDAZOLE 500 MG: 500 INJECTION, SOLUTION INTRAVENOUS at 11:02

## 2025-07-15 RX ADMIN — METRONIDAZOLE 500 MG: 500 INJECTION, SOLUTION INTRAVENOUS at 03:38

## 2025-07-15 ASSESSMENT — PAIN DESCRIPTION - DIRECTION: RADIATING_TOWARDS: N.A

## 2025-07-15 ASSESSMENT — PAIN SCALES - GENERAL: PAINLEVEL_OUTOF10: 6

## 2025-07-15 ASSESSMENT — PAIN DESCRIPTION - DESCRIPTORS: DESCRIPTORS: ACHING

## 2025-07-15 ASSESSMENT — PAIN DESCRIPTION - ONSET: ONSET: ON-GOING

## 2025-07-15 ASSESSMENT — PAIN DESCRIPTION - LOCATION: LOCATION: ABDOMEN

## 2025-07-15 ASSESSMENT — PAIN - FUNCTIONAL ASSESSMENT: PAIN_FUNCTIONAL_ASSESSMENT: ACTIVITIES ARE NOT PREVENTED

## 2025-07-15 ASSESSMENT — PAIN DESCRIPTION - FREQUENCY: FREQUENCY: INTERMITTENT

## 2025-07-15 ASSESSMENT — PAIN DESCRIPTION - ORIENTATION: ORIENTATION: MID

## 2025-07-15 ASSESSMENT — PAIN DESCRIPTION - PAIN TYPE: TYPE: ACUTE PAIN

## 2025-07-15 NOTE — CARE COORDINATION
Case Management Assessment            Discharge Note                    Date / Time of Note: 7/15/2025 9:01 AM                  Discharge Note Completed by: Elizabeth Wei    Patient Name: Barbra Cortés   YOB: 2000  Diagnosis: Dehydration [E86.0]  Generalized abdominal pain [R10.84]  SIRS (systemic inflammatory response syndrome) (HCC) [R65.10]  Sepsis (HCC) [A41.9]   Date / Time: 7/1/2025  3:59 PM    Current PCP: Jade Lucia MD  Clinic patient: No    Hospitalization in the last 30 days: No       Advance Directives:  Code Status: Full Code  [place] DNR form completed and on chart: Not Indicated    Financial:  Payor: [facility] / Plan: [facility] MEDICAID / Product Type: *No Product type* /      Pharmacy:    [facility] - [place], [place] - [address] - P [phone] - F [phone]  [address]  [address]  [facility] [address]  Phone: [phone] Fax: [phone]      Assistance purchasing medications?: Potential Assistance Purchasing Medications: No  Assistance provided by Case Management: None at this time    Does patient want to participate in local refill/ meds to beds program?:      Meds To Beds General Rules:  1. Can ONLY be done Monday- Friday between 8:30am-5pm  2. Prescription(s) must be in pharmacy by 3pm to be filled same day  3.Copy of patient's insurance/ prescription drug card and patient face sheet must be sent along with the prescription(s)  4. Cost of Rx cannot be added to hospital bill. If financial assistance is needed, please contact unit  or ;  or  CANNOT provide pharmacy voucher for patients co-pays  5. Patients can then  the prescription on their way out of the hospital at discharge, or pharmacy can deliver to the bedside if staff is available. (payment due at time of pick-up or delivery - cash, check, or card accepted)     Able to afford home medications/ co-pay costs: Yes    ADLS:  Current

## 2025-07-15 NOTE — PLAN OF CARE
Problem: Pain  Goal: Verbalizes/displays adequate comfort level or baseline comfort level  Outcome: Progressing   Complains of generalized pain but refusing oral pain medicine and repositioning.       Problem: Safety - Adult  Goal: Free from fall injury  Outcome: Progressing   Patient has remained free of falls. 2/4 bed rails up, bed locked and in lowest position, call light within reach. Patient instructed on use of call light and uses appropriately. Bed alarm on. Non-skid footwear and fall band on.

## 2025-07-15 NOTE — DISCHARGE SUMMARY
(Oral)   Resp 16   Ht 1.93 m (6' 4\")   Wt 60.4 kg (133 lb 2.5 oz)   SpO2 98%   BMI 16.21 kg/m²       Physical Exam:   General: chronically ill  Cardiovascular: Regular rate.  Respiratory: Clear to auscultation  Gastrointestinal: refused exam  Genitourinary: refused exam  Musculoskeletal: No edema  Neuro: Alert.  Psych: Mood appropriate.         Labs and Imaging   No results found.    CBC:   Recent Labs     07/13/25  0419 07/14/25  0842   WBC 16.6* 14.7*   HGB 8.1* 7.5*    323     BMP:    Recent Labs     07/13/25  0419 07/14/25  0842 07/14/25  1706 07/15/25  1106    140  --  143  143   K 3.5 2.7* 3.0* 3.0*  3.0*    102  --  109  109   CO2 24 23  --  23  23   BUN 8 6*  --  4*  4*   CREATININE 1.3 1.3  --  1.1  1.1   GLUCOSE 109* 100*  --  106*  108*     Hepatic: No results for input(s): \"AST\", \"ALT\", \"BILITOT\", \"ALKPHOS\" in the last 72 hours.    Invalid input(s): \"ALB\"  Lipids:   Lab Results   Component Value Date/Time    CHOL 58 07/09/2025 05:11 AM    HDL 24 07/09/2025 05:11 AM    TRIG 75 07/09/2025 05:11 AM     Hemoglobin A1C:   Lab Results   Component Value Date/Time    LABA1C 5.5 07/09/2025 05:11 AM     TSH: No results found for: \"TSH\"  Troponin: No results found for: \"TROPONINT\"  Lactic Acid: No results for input(s): \"LACTA\" in the last 72 hours.  BNP:   Recent Labs     07/14/25  0815   PROBNP 1,973*     UA:  Lab Results   Component Value Date/Time    NITRU Negative 07/14/2025 01:13 PM    COLORU Yellow 07/14/2025 01:13 PM    PHUR 6.0 07/14/2025 01:13 PM    WBCUA 3-5 07/14/2025 01:13 PM    RBCUA 0-2 07/14/2025 01:13 PM    BACTERIA 1+ 07/14/2025 01:13 PM    CLARITYU Clear 07/14/2025 01:13 PM    LEUKOCYTESUR SMALL 07/14/2025 01:13 PM    UROBILINOGEN 0.2 07/14/2025 01:13 PM    BILIRUBINUR Negative 07/14/2025 01:13 PM    BLOODU TRACE-INTACT 07/14/2025 01:13 PM    GLUCOSEU Negative 07/14/2025 01:13 PM    KETUA Negative 07/14/2025 01:13 PM     Urine Cultures: No results found for:

## 2025-07-15 NOTE — PROGRESS NOTES
Hospital Medicine Progress Note  V 5.17      Date of Admission: 7/1/2025    Hospital Day: 4      Chief Admission Complaint: Worsening abdominal pain and poor appetite    Subjective: Today he is complaining of worsening bilateral lower extremity pain, burning and numbness and tingling states used to get gabapentin which is not working anymore     Diet advanced from clear liquid to full liquid though intake remains very poor; denies nausea and vomit; positive output per ileostomy not accurately charted; ostomy with dark-colored liquidy stool; refusing to get out of bed and participate with therapy; reports he has not gotten out of bed for a while    Interval-potassium and phosphorus remains low; ordered replacement; mag to be replaced as well  Presenting Admission History:       Barbra Cortés is a 25 y.o. male with Hx of adjustment disorder, colonic and perianal Crohn's s/p lap TAC with end ileostomy (11/2023, [facility], [place]) c/b SBO 2/2 ileostomy twisting s/p lap w/ ileostomy revision (4/6/2025) and chronic abdominal pain several non-healing wounds, sacral ulceration, and perianal crohns complications (Recent EUA, seton removal [facility]. 5/22/25) who presents with a 3-day history of left lower quadrant abdominal pain.      Patient reports the pain began 3 days ago and has been a constant stabbing since. The pain is a recurrent issue for 3 years with no known trigger. He describes the pain as 10/10 in severity and non-radiating. He reports normal ostomy output, however he changed his bag 1hr prior and it remains empty. Nothing aggravates or improves the pain. He denies nausea or vomiting. He denies subjective fever or chills. He states he has been emptying his bag three times daily since last leaving .      Patient missed his GI appointment yesterday, he is currently not on any biologics for crohn's        Assessment/Plan:      ?? Sepsis stage IV sacral ulcer  Currently on vancomycin and Zosyn  Wound care consult 
      Hospital Medicine Progress Note  V 5.17      Date of Admission: 7/1/2025    Hospital Day: 5      Chief Admission Complaint: Worsening abdominal pain and poor appetite    Subjective: Diet upgrade regular; he is eating slightly better; bilateral lower extremity pain and numbness and tingling has improved with Lyrica    Encourage patient to get out of bed if possible and sit on the chair;  He was seen by infectious disease yesterday remains on vancomycin and Zosyn prophylactically; pending repeat CT scan    Interval-potassium and phosphorus remains low; ordered replacement; mag to be replaced as well  Presenting Admission History:       Barbra Cortés is a 25 y.o. male with Hx of adjustment disorder, colonic and perianal Crohn's s/p lap TAC with end ileostomy (11/2023, [facility], [place]) c/b SBO 2/2 ileostomy twisting s/p lap w/ ileostomy revision (4/6/2025) and chronic abdominal pain several non-healing wounds, sacral ulceration, and perianal crohns complications (Recent EUA, seton removal [facility]. 5/22/25) who presents with a 3-day history of left lower quadrant abdominal pain.      Patient reports the pain began 3 days ago and has been a constant stabbing since. The pain is a recurrent issue for 3 years with no known trigger. He describes the pain as 10/10 in severity and non-radiating. He reports normal ostomy output, however he changed his bag 1hr prior and it remains empty. Nothing aggravates or improves the pain. He denies nausea or vomiting. He denies subjective fever or chills. He states he has been emptying his bag three times daily since last leaving .      Patient missed his GI appointment yesterday, he is currently not on any biologics for crohn's        Assessment/Plan:      ?? Sepsis stage IV sacral ulcer  Currently on vancomycin and Zosyn  Wound care consult   Purulent drainage per perianal wounds  Surgery saw the patient does not recommend any intervention  ID consult  1 set of blood culture with 
      Hospital Medicine Progress Note  V 5.17      Date of Admission: 7/1/2025    Hospital Day: 6      Chief Admission Complaint: Worsening abdominal pain and poor appetite    Subjective: Diet upgrade regular; he is eating slightly better; bilateral lower extremity pain and numbness and tingling has improved with Lyrica    I am okay with him getting 1 or 2 doses of Dilaudid with transitioning to oral pain medication versus pain patch per further recommendation by pharmacy    Encourage patient to get out of bed if possible and sit on the chair;  He was seen by infectious disease yesterday remains on vancomycin and Zosyn prophylactically; pending repeat CT scan    Interval-potassium and phosphorus remains low; ordered replacement; mag to be replaced as well  Presenting Admission History:       Barbra Cortés is a 25 y.o. male with Hx of adjustment disorder, colonic and perianal Crohn's s/p lap TAC with end ileostomy (11/2023, [facility], [place]) c/b SBO 2/2 ileostomy twisting s/p lap w/ ileostomy revision (4/6/2025) and chronic abdominal pain several non-healing wounds, sacral ulceration, and perianal crohns complications (Recent EUA, seton removal [facility]. 5/22/25) who presents with a 3-day history of left lower quadrant abdominal pain.      Patient reports the pain began 3 days ago and has been a constant stabbing since. The pain is a recurrent issue for 3 years with no known trigger. He describes the pain as 10/10 in severity and non-radiating. He reports normal ostomy output, however he changed his bag 1hr prior and it remains empty. Nothing aggravates or improves the pain. He denies nausea or vomiting. He denies subjective fever or chills. He states he has been emptying his bag three times daily since last leaving .      Patient missed his GI appointment, he is currently not on any biologics for crohn's        Assessment/Plan:      ?? Sepsis stage IV sacral ulcer  Currently on vancomycin and Zosyn  Wound care consult 
      Hospitalist Progress Note      Name:  Barbra Cortés /Age/Sex: 2000  (25 y.o. male)   MRN & CSN:  [id number] & [id number] Encounter Date/Time: 2025 8:13 AM EDT    Location:  [facility]- PCP: Jade Lucia MD       Hospital Day: 11         Date of Admission: 2025    Chief Complaint: Worsening abdominal pain and poor appetite     Hospital Course: Barbra Cortés is a 25 y.o. male with Hx of adjustment disorder, mood disorder, anemia, tachycardia, colonic and perianal Crohn's s/p lap TAC with end ileostomy (2023, [facility], [place]) c/b SBO 2/2 ileostomy twisting s/p lap w/ ileostomy revision (2025) and chronic abdominal pain several non-healing wounds, sacral ulceration, and perianal crohns complications (Recent EUA, seton removal [facility]. 25) who presents with a 3-day history of left lower quadrant abdominal pain. Patient reported the pain began 3 days ago () and has been a constant stabbing since. The pain is a recurrent issue for 3 years with no known trigger. He described the pain as 10/10 in severity and non-radiating. He reported normal ostomy output, however he changed his bag 1hr prior and it remains empty. Nothing aggravates or improves the pain. He denied nausea or vomiting. He denied subjective fever or chills. He stated he has been emptying his bag three times daily since last leaving UC. Patient missed his GI appointment, he is currently not on any biologics for Crohn's. CT imaging was consistent with a closed-loop obstruction and he was septic with stage IV pressure ulcers on his back and hidradenitis suppurativa of his perineum with leukocytosis and hence this admission.  ID consulted for leukocytosis, WBC was 22.1, afebrile. He was started on IV Vanc and Zosyn. Blood cultures x 1 positive for staph epi. He is on Flagyl 500 MG topical for sacral wounds. Wound care following, however, patient has been refusing wound care or examination of perineum region at times.  He 
      Hospitalist Progress Note      Name:  Barbra Cortés /Age/Sex: 2000  (25 y.o. male)   MRN & CSN:  [id number] & [id number] Encounter Date/Time: 2025 11:52 AM EDT    Location:  [facility]- PCP: Jade Lucia MD       Hospital Day: 13         Date of Admission: 2025    Chief Complaint: Worsening abdominal pain and poor appetite     Hospital Course: Barbra Cortés is a 25 y.o. male with Hx of adjustment disorder, mood disorder, anemia, tachycardia, colonic and perianal Crohn's s/p lap TAC with end ileostomy (2023, [facility], [place]) c/b SBO 2/2 ileostomy twisting s/p lap w/ ileostomy revision (2025) and chronic abdominal pain several non-healing wounds, sacral ulceration, and perianal crohns complications (Recent EUA, seton removal [facility]. 25) who presents with a 3-day history of left lower quadrant abdominal pain. Patient reported the pain began 3 days ago () and has been a constant stabbing since. The pain is a recurrent issue for 3 years with no known trigger. He described the pain as 10/10 in severity and non-radiating. He reported normal ostomy output, however he changed his bag 1hr prior and it remains empty. Nothing aggravates or improves the pain. He denied nausea or vomiting. He denied subjective fever or chills. He stated he has been emptying his bag three times daily since last leaving UC. Patient missed his GI appointment, he is currently not on any biologics for Crohn's. CT imaging was consistent with a closed-loop obstruction and he was septic with stage IV pressure ulcers on his back and hidradenitis suppurativa of his perineum with leukocytosis and hence this admission.  ID consulted for leukocytosis, WBC was 22.1, afebrile. He was started on IV Vanc and Zosyn. Blood cultures x 1 positive for staph epi. He is on Flagyl 500 MG topical for sacral wounds. Wound care following, however, patient has been refusing wound care or examination of perineum region at times.  He 
      Hospitalist Progress Note      Name:  Barbra Cortés /Age/Sex: 2000  (25 y.o. male)   MRN & CSN:  [id number] & [id number] Encounter Date/Time: 2025 8:42 AM EDT    Location:  [facility]-01 PCP: Jade Lucia MD       Hospital Day: 8         Date of Admission: 2025    Chief Complaint: Worsening abdominal pain and poor appetite     Hospital Course: Barbra Cortés is a 25 y.o. male with Hx of adjustment disorder, mood disorder, anemia, tachycardia, colonic and perianal Crohn's s/p lap TAC with end ileostomy (2023, [facility], [place]) c/b SBO 2/2 ileostomy twisting s/p lap w/ ileostomy revision (2025) and chronic abdominal pain several non-healing wounds, sacral ulceration, and perianal crohns complications (Recent EUA, seton removal [facility]. 25) who presents with a 3-day history of left lower quadrant abdominal pain. Patient reported the pain began 3 days ago () and has been a constant stabbing since. The pain is a recurrent issue for 3 years with no known trigger. He described the pain as 10/10 in severity and non-radiating. He reported normal ostomy output, however he changed his bag 1hr prior and it remains empty. Nothing aggravates or improves the pain. He denied nausea or vomiting. He denied subjective fever or chills. He stated he has been emptying his bag three times daily since last leaving UC. Patient missed his GI appointment, he is currently not on any biologics for crohn's. CT imaging was consistent with a closed-loop obstruction and he was septic with stage IV pressure ulcers on his back and hidradenitis suppurativa of his perineum with leukocytosis and hence this admission.  ID consulted for leukocytosis, WBC was 22.1, afebrile. He was started on IV Vanc and Zosyn. Blood cultures x 1 positive for staph epi. He is on Flagyl 500 MG topical for sacral wounds. Wound care following, however, patient has been refusing wound care or examination of perineum region at times.  He has 
      Hospitalist Progress Note      Name:  Barbra Cortés /Age/Sex: 2000  (25 y.o. male)   MRN & CSN:  [id number] & [id number] Encounter Date/Time: 2025 8:42 AM EDT    Location:  [facility]4321- PCP: Jade Lucia MD       Hospital Day: 12         Date of Admission: 2025    Chief Complaint: Worsening abdominal pain and poor appetite     Hospital Course: Barbra Cortés is a 25 y.o. male with Hx of adjustment disorder, mood disorder, anemia, tachycardia, colonic and perianal Crohn's s/p lap TAC with end ileostomy (2023, [facility], [place]) c/b SBO 2/2 ileostomy twisting s/p lap w/ ileostomy revision (2025) and chronic abdominal pain several non-healing wounds, sacral ulceration, and perianal crohns complications (Recent EUA, seton removal [facility]. 25) who presents with a 3-day history of left lower quadrant abdominal pain. Patient reported the pain began 3 days ago () and has been a constant stabbing since. The pain is a recurrent issue for 3 years with no known trigger. He described the pain as 10/10 in severity and non-radiating. He reported normal ostomy output, however he changed his bag 1hr prior and it remains empty. Nothing aggravates or improves the pain. He denied nausea or vomiting. He denied subjective fever or chills. He stated he has been emptying his bag three times daily since last leaving UC. Patient missed his GI appointment, he is currently not on any biologics for Crohn's. CT imaging was consistent with a closed-loop obstruction and he was septic with stage IV pressure ulcers on his back and hidradenitis suppurativa of his perineum with leukocytosis and hence this admission.  ID consulted for leukocytosis, WBC was 22.1, afebrile. He was started on IV Vanc and Zosyn. Blood cultures x 1 positive for staph epi. He is on Flagyl 500 MG topical for sacral wounds. Wound care following, however, patient has been refusing wound care or examination of perineum region at times.  He 
  Barbra Cortés is a 25 y.o. male with Hx of adjustment disorder, colonic and perianal Crohn's s/p lap TAC with end ileostomy (11/2023, [facility], [place]) c/b SBO 2/2 ileostomy twisting s/p lap w/ ileostomy revision (4/6/2025) and chronic abdominal pain several non-healing wounds, sacral ulceration, and perianal crohns complications (Recent EUA, seton removal [facility]. 5/22/25) who presents with a 3-day history of left lower quadrant abdominal pain.      Patient reports the pain began 3 days ago and has been a constant stabbing since. The pain is a recurrent issue for 3 years with no known trigger. He describes the pain as 10/10 in severity and non-radiating. He reports normal ostomy output, however he changed his bag 1hr prior and it remains empty. Nothing aggravates or improves the pain. He denies nausea or vomiting. He denies subjective fever or chills. He states he has been emptying his bag three times daily since last leaving .      Patient missed his GI appointment yesterday, he is currently not on any biologics for crohn's      Patient states his appetite has been unchanged recently. He frequently asks for pain medications and food.      Patient is severely malnourished with BMI of 14     ?? Sepsis stage IV sacral ulcer  Currently on vancomycin and Zosyn  Wound care consult       Abdominal pain  Seen by surgical team no acute surgical intervention indicated  They initially recommended transfer to [facility] though it appears patient was admitted here secondary to no bed availability  Continue aggressive fluid resuscitation; okay for clear liquid diet; ostomy is functioning again  His sxs are acute on chronic and similar to previous presentation. No clinical signs of obstruction at this time but he is very high risk. Day of dc he has stool in ostomy and was passing flatus.         Nonadherence to medical treatment  - Patient remains nonadherent to care intermittently.  Patient refused care several times at nursing 
  Comprehensive Nutrition Assessment    RECOMMENDATIONS:  PO Diet: Continue Clear Liquids, advance per general surgery  Nutrition Supplement: Will order Ensure Clear TID  Nutrition Education: No recommendation at this time     NUTRITION ASSESSMENT:   Nutritional summary & status: Assessed for low BMI and wounds. Pt has 2 PIs- not staged yet. Presented to ED from nursing facility for abnormal labs and abdominal pain x2 days; found to have severe sepsis per H&P. No recent wt hx in EMR (including careeverywhere). Per general surgery, no acute surgical intervention indicated & okay for CLD 7/2. Upon visit, pt reports UBW as 165 lb; states he was last that wt a couple months ago, however RD questions accuracy of this. Unable to truly assess wt loss d/t lack of EMR hx. Pt declined NFPE and doesn't appear to have body fat or muscle mass loss in areas able to be visualized (temples, orbital, buccal). Reports poor intake pta; endorses eating 1 meal or less/day but states he's been trying to increase intake. Reports poor intake r/t nausea, pain, and loss of appetite. Endorses hunger and wanting diet to be advanced; also requesting gatorade which RD put into [employer]. Will order Ensure Clear and continue to monitor for further diet advancement.   Admission // PMH: Sepsis // hidradenitis suppurativa, perianal abscess, Crohn's disease w/ multiple complications including ileostomy, SBO    MALNUTRITION ASSESSMENT  Context of Malnutrition: Chronic Illness   Malnutrition Status: At risk for malnutrition  Findings of the 6 clinical characteristics of malnutrition (Minimum of 2 out of 6 clinical characteristics is required to make the diagnosis of moderate or severe Protein Calorie Malnutrition based on AND/ASPEN Guidelines):  Energy Intake:  75% or less estimated energy requirements for 1 month or longer  Weight Loss:  Mild weight loss (17% over 4 years)     Body Fat Loss:  Unable to assess (pt declined NFPE)     Muscle Mass Loss:  
  Comprehensive Nutrition Assessment    RECOMMENDATIONS:  PO Diet: Continue Regular, GI [place], Low Fiber  Nutrition Supplement: Pt may order Fruit Smoothie once daily as desired  Nutrition Education: Education/Counseling not indicated     NUTRITION ASSESSMENT:   Nutritional summary & status: Follow up. Patient continues to tolerate po diet with GI bland and low fiber restrictions in place. Intake has improved since admit and pt continues to consume % of three meals a day, unsure of wound healing ONS acceptance as patient unsure if he has received it. Per chart review patient refused meds, vitals and assessments overnight. Psych rec'd increasing remeron dose to 45 mg daily to aid with both mood and appetite. Per CM plans to d/c to SNF (precert approved) when medically ready. RD will continue to monitor po acceptance and need for further intervention.   Admission // PMH: Sepsis // hidradenitis suppurativa, perianal abscess, Crohn's disease w/ multiple complications including ileostomy, SBO    MALNUTRITION ASSESSMENT  Context of Malnutrition: Chronic Illness   Malnutrition Status: At risk for malnutrition  Findings of the 6 clinical characteristics of malnutrition (Minimum of 2 out of 6 clinical characteristics is required to make the diagnosis of moderate or severe Protein Calorie Malnutrition based on AND/ASPEN Guidelines):  Energy Intake:  75% or less estimated energy requirements for 1 month or longer  Weight Loss:  Mild weight loss (17% over 4 years)     Body Fat Loss:  Unable to assess (pt declined NFPE)     Muscle Mass Loss:  Unable to assess      NUTRITION DIAGNOSIS   Underweight related to inadequate protein-energy intake as evidenced by BMI, poor intake prior to admission    Nutrition Monitoring and Evaluation:   Food/Nutrient Intake Outcomes:  Food and Nutrient Intake, Supplement Intake  Physical Signs/Symptoms Outcomes:  Biochemical Data, GI Status, Nutrition Focused Physical Findings, Skin, Weight 
  Physician Progress Note      PATIENT:               YESENIA UREÑA  [facility] #:                  [id number]  :                       2000  ADMIT DATE:       2025 3:59 PM  DISCH DATE:  RESPONDING  PROVIDER #:        SHOAIB GELELR          QUERY TEXT:    Severe malnutrition is documented in the medical record by IM . Please   provide additional clinical indicators supportive of your documentation. Or   please document if the diagnosis of severe malnutrition has been ruled out   after study.    The clinical indicators include:  24 yo w/ Crohn's, stage 4 pressure ulcer, hidradenitis    Per MD : Patient is severely malnourished with BMI of 14.  Per Registered Dietician :  At risk for malnutrition. Energy Intake: 75% or   less estimated energy requirements for 1 month or longer. Mild weight loss   (17% over 4 years). Underweight related to inadequate protein-energy intake as   evidenced by BMI, poor intake prior to admission. pt reports UBW as 165 lb;   states he was last that wt a couple months ago, however RD questions accuracy   of this. Unable to truly assess wt loss d/t lack of EMR hx. Pt declined NFPE   and doesn't appear to have body fat or muscle mass loss in areas able to be   visualized (temples, orbital, buccal).    RD monitoring, Ensure TID, advancement of diet  Options provided:  -- Severe malnutrition present as evidenced by, Please document evidence.  -- Severe malnutrition was ruled out after study, patient is underweight  -- Other - I will add my own diagnosis  -- Disagree - Not applicable / Not valid  -- Disagree - Clinically unable to determine / Unknown  -- Refer to Clinical Documentation Reviewer    PROVIDER RESPONSE TEXT:    Severe malnutrition is present as evidenced by Poor po intake 75% or less.   Chronic mediccal condition: Chrohn's, Stage IV Chronic sacral ulcer    Query created by: Violette Fagan on 2025 10:07 AM      Electronically signed by:  SHOAIB GELLER 
4 Eyes Skin Assessment     NAME:  Barbra Cortés  YOB: 2000  MEDICAL RECORD NUMBER:  [id number]    The patient is being assessed for  Admission    I agree that at least one RN has performed a thorough Head to Toe Skin Assessment on the patient. ALL assessment sites listed below have been assessed.      Areas assessed by both nurses:    Head, Face, Ears, Shoulders, Back, Chest, Arms, Elbows, Hands, Sacrum. Buttock, Coccyx, Ischium, Legs. Feet and Heels, and Under Medical Devices         Does the Patient have a Wound? Yes wound(s) were present on assessment. LDA wound assessment was Initiated and completed by RN       Liam Prevention initiated by RN: Yes  Wound Care Orders initiated by RN: Yes    Pressure Injury (Stage 1,2,3,4, Unstageable, DTI, NWPT, and Complex wounds) if present, place Wound referral order by RN under : Yes    New Ostomies, if present place, Ostomy referral order under : Yes     Nurse 1 eSignature: Electronically signed by Sandy Alcala RN on 7/2/25 at 6:46 AM EDT    **SHARE this note so that the co-signing nurse can place an eSignature**    Nurse 2 eSignature: Electronically signed by Ileana Nair RN on 7/2/25 at 6:50 AM EDT    
All labs have been collected by lab, including an extra lavender as well as ROSSANA and mint green for the chem.  
Attempted to change patient's wound dressing, refused again. Also continuing to refuse oral pain medicine.   
Clinical Pharmacy Consult Note  Medication History     Admit Date: 7/1/2025    List of current medications patient is taking is complete. Home Medication list in Epic updated to reflect changes noted below.    Source of information: Facility transfer papers     Patient's home pharmacy:   [facility] - [place], [place] - [address] - P [phone] - F [phone]  [address]  [address]  [facility] [address]  Phone: [phone] Fax: [phone]      Changes made to medication list:     Medications removed - patient not taking per facility transfer papers:   None    Medications added:   Clindamycin 1% topical gel   Multivitamin supplement   Naloxone 4 mg/0.1 mL nasal spray   Polyethylene glycol 3350 powder 17 g packet   Scopolamine 1 mg/3 days transdermal patch  Sennosides 8.6 mg tablet     Medication doses adjusted per facility transfer papers:   Benzoyl peroxide 10% external gel - added directions: apply to groin topically at bedtime for breakdown  Duloxetine 30 mg delayed release sprinkle - added end date: 07/05/2025  Metoprolol succinate 100 mg extended release tablet - dose changed from 25 mg daily to 100 mg daily   Metronidazole 500 mg tablet - dose changed from 1 tablet by mouth three times daily to crush and sprinkle to sacrum, right buttock, left buttock, posterior medial scrotum, right thigh, left thigh every night for wound care   Midodrine 5 mg tablet - dose changed from three times daily to every 8 hours as needed for SBP less than 90   Ondansetron 4 mg tablet - dose changed from every 8 hours as needed to three times a day 30 minutes before meals for nausea  Polycarbophil 625 mg tablet - dose changed from 1 tablet daily to 2 tablets twice a day   Potassium chloride 20 mEq extended release tablet - dose changed from 2 tablets twice daily to 2 tablets daily     Other notes:   Med rec completed using facility transfer papers, all last doses approximate   Metronidazole 500 mg tablet - patient 
Clinical Pharmacy Consult Note - Pain Regimen Recommendations    Admit Date: 7/1/2025   Consulting Provider: BOUCHRA Lizarraga    Pharmacy has been consulted to make pain medication recommendations by BOUCHRA Lizarraga / assess for potential trial of TD fentanyl patch.    Subjective/Objective: Pt is a 25 yoM with a PMHx that includes Crohn's disease, chronic pain, adjustment disorder with mixed anxiety and depressed mood, non-adherence to medical treatment, pericarditis, IBD, and anemia who is admitted with abdominal pain from his nursing facility ([facility]).    Of note, pt was recently admitted to OSH ([facility]) from 6/1/25 to 6/16/25. During recent OSH admission, the following is noted regarding patient's chronic pain:   \"Pt has chronic pain 2/2 abdominal procedures and non healing wounds in his perianal region. Has been on/off high doses of opioids in the past during hospitalizations and is endorsing pain that is severe and only improves with IV Dilaudid. Multiple PO medications available, but patient has generally refused these medications. Did trial several on 5/3 and was able to keep them down.  - On scheduled IV zofran at mealtimes. Attempt PO medications ~30 minutes after zofran  - Can use PRN compazine for breakthrough nausea  -WILL NOT transition pain medications to IV. Patient has PO dilaudid available but needs to try at least one non-opiate pain medication before receiving dilaudid. Would only transition to IV if there is an acute change.  - Continue scheduled tylenol, cymbalta, robaxin, gabapentin, and bentyl  - Patient not interested in buprenorphine\"     Daily update:  7/6: Per RN report this afternoon, patient is requesting parenteral pain medication only and is refusing additional line placement for imaging required. Per attending surgeon MD note in chart at this time (Dr. Nur), there is low suspicion for SBO currently and patient has regular diet ordered (able to take PO 
Clinical Pharmacy Progress Note    Assessment/Plan:  1)  Pain regimen recommendations:  Discussed at length again with BOUCHRA Ziegler --   Pt now refusing fentanyl patch, also refusing oral oxycodone. Shara provided education to pt again today that an oral pain regimen will need to be established before discharge  Oxycodone d/c'd today as pt refusing. Orders placed for hydromorphone 1 mg PO q4h PRN pain.   Remains on morphine 2mg IV q2h PRN breakthrough pain  Recommend adding order for PRN naloxone, monitor for sedation/drowsiness    Will continue to monitor and assist with adjustments to regimen as needed.    Please call with questions--  Kalli Qureshi, PharmD, [facility]  Wireless: [phone]   7/11/2025 12:57 PM  ____________________________________________________  Admit date: 7/1/2025     Subjective/Objective:  Pt is a 25yoM with a PMHx that includes Crohn's disease, chronic pain, adjustment disorder, anxiety, IBD, who is admitted with stage IV sacral wound, hx of Crohn's disease, concern for SBO (now resolved).    Pharmacy has been consulted to make pain medication recommendations by BOUCHRA Ziegler    7/10:  Discussed issues with pain control throughout admission with Shara Holly this AM. Pt has been reluctant for wound dressing changes / exam due to pain, has refused PO pain medication. Received total of morphine 7mg IV over past 24h. Has been on fentanyl patch in the past - most recently, on 50mcg/hr patch, in Apr 2025 per PDMP. Shara would like trial of fentanyl patch to help control pain / encourage participation in medical care.   7/11: Pt more cooperative for exam today. Refused fentanyl patch as pt now says this has not worked in the past.    Current pain regimen:   Medication  Home med?  Amount used last 24 hrs    Acetaminophen 500mg po q6h PRN pain No  None   No  None - now d/c'd as pt refused   Morphine 2mg IV q2h PRN pain No  22 mg IV   No  None   Pregabalin 100mg po TID  3 doses 
Clinical Pharmacy Progress Note    Assessment/Plan:  1)  Pain regimen recommendations:  Pt ordered fentanyl patch 25mcg/hr patch q72h starting today - discussed with Shara Holly as he has not been receiving consistent opioids this admission (refusing PO) but has been on fentanyl patch in the past.  Transdermal fentanyl will take time to work - not recommended to titrate dose more frequency than every 72h after initial patch applied (or every 6 days thereafter).   Morphine IV has been reduced to 2mg IV q2h PRN to be used for wound assessment / pain with movement to allow participation in exam  Also has order for oxycodone IR 5-10mg po q4h PRN pain   Recommend adding order for PRN naloxone, monitor for sedation/drowsiness    Will continue to monitor and assist with adjustments to regimen as needed.    Please call with questions--  Kalli Qureshi, PharmD, [facility]  Wireless: [phone]   7/10/2025 12:04 PM  ____________________________________________________  Admit date: 7/1/2025     Subjective/Objective:  Pt is a 25yoM with a PMHx that includes Crohn's disease, chronic pain, adjustment disorder, anxiety, IBD, who is admitted with stage IV sacral wound, hx of Crohn's disease, concern for SBO (now resolved).    Pharmacy has been consulted to make pain medication recommendations by Shara Holly, APRN    7/10:  Discussed issues with pain control throughout admission with Shara Holly this AM. Pt has been reluctant for wound dressing changes / exam due to pain, has refused PO pain medication. Received total of morphine 7mg IV over past 24h. Has been on fentanyl patch in the past - most recently, on 50mcg/hr patch, in Apr 2025 per PDMP. Shara would like trial of fentanyl patch to help control pain / encourage participation in medical care.     Current pain regimen:   Medication  Home med?  Amount used last 24 hrs    Acetaminophen 500mg po q6h PRN pain No  None   Fentanyl 25 mcg/hr patch - 1 patch q72h No  None - 
Colostomy bag changed by pt, refusing help from RN. PT bed pad soaked, refused to be replaced stating \" maybe later tonight\", RN added another extra pad to cover the soaked bed pad for now. Will attempt to change again later if pt allows   
Colostomy barrier and ring changed, was agreeable on using barrier paste. Pt did refuse the use of wash cloths and warm water , insisted on only using dry wash cloths.  Skin breakdown starting around RLQ, educated pt on skin breakdown prevention. Pt seems more agreeable on Q2 turns now also, wedge placed.   
Colostomy barrier ring and bag changed, Patient declined use of warm water for site care, with or without soap. Patient declines use of other ostomy products when performing site care. Patient uses only dry washcloths. This RN provided education on site care management and skin breakdown prevention measures. Patient still refused additional products and wet, warm washcloths.  
Consulted for buttocks and rectal wounds. When turning pt, noted white purulent drainage coming from some of the wounds near the rectal area. Pt complaining of pain, requesting Wound Care RN to stop. Concern for source of sepsis. Perfect Serve message sent to provider recommending General Surgery consult.   
General Surgery   Daily Progress Note  Patient: Barbra Cortés      CC: Concern for SBO         SUBJECTIVE:   No acute issues overnight. Ostomy is functional. Tolerating diet. No fevers or chills.    OBJECTIVE:    PHYSICAL EXAM:    Vitals:    07/06/25 1800 07/06/25 2009 07/06/25 2332 07/07/25 0433   BP: 133/67 125/88 125/84 116/77   Pulse: (!) 103 (!) 112 (!) 122 (!) 126   Resp: 17 18 18 18   Temp: 98.7 °F (37.1 °C) 97.7 °F (36.5 °C) 97.4 °F (36.3 °C) 98.9 °F (37.2 °C)   TempSrc: Oral Axillary Axillary Axillary   SpO2: 99% 99% 100% 100%   Weight:    64.5 kg (142 lb 3.2 oz)   Height:           General appearance: alert, no acute distress  Chest/Lungs: normal effort with no accessory muscle use, no signs of respiratory distress, on RA  Cardiovascular: regular rate  Abdomen: Ileostomy with stool output, non tender.  Skin: Patient refused perianal examination  Neuro: A&Ox3, no focal deficits    LABS:   Recent Labs     07/05/25  0425 07/06/25  0525   WBC 11.9* 13.1*   HGB 7.1* 7.8*   HCT 23.4* 25.2*   MCV 80.6 78.1*    418        Recent Labs     07/05/25  0425 07/06/25  0525    137   K 3.2* 3.3*    103   CO2 22 22   PHOS 2.1* 2.0*   BUN <2* <2*   CREATININE 0.5* 0.5*        ASSESSMENT & PLAN:   Barbra Cortés is a 25 y.o. male with Hx of adjustment disorder, colonic and perianal Crohn's s/p lap TAC with end ileostomy (11/2023, [facility]) c/b SBO 2/2 ileostomy twisting s/p lap w/ ileostomy revision (4/6/2025 [facility]) and chronic abdominal pain several non-healing wounds, sacral ulceration, and perianal crohns complications (Recent EUA, seton removal [facility]. 5/22/25)  who presents with abdominal pain and CT findings concerning for SBO with possible closed loop near the ileostomy. Repeat CT scan (7/5) w/o evidence of obstruction and no perirectal abscesses     Recommend transfer to [facility] for continuity of care, patient declines  Low suspicion for SBO  No planned intervention this 
General Surgery   Daily Progress Note  Patient: Barbra Cortés      CC: Concern for SBO         SUBJECTIVE:   Patient resting in bed this morning had no acute issues overnight. His ostomy is functional. Tolerating diet.     OBJECTIVE:    PHYSICAL EXAM:    Vitals:    07/05/25 0305 07/05/25 0452 07/05/25 0453 07/05/25 0827   BP: (!) 154/98   (!) 135/99   Pulse: (!) 112   89   Resp:    16   Temp: 98.1 °F (36.7 °C)   98.1 °F (36.7 °C)   TempSrc:    Oral   SpO2: 100%   99%   Weight: 59.5 kg (131 lb 2.8 oz) 65.8 kg (145 lb 1 oz) 65.8 kg (145 lb 1 oz)    Height:           General appearance: alert, no acute distress  Chest/Lungs: normal effort with no accessory muscle use, no signs of respiratory distress, on RA  Cardiovascular: regular rate  Abdomen: Ileostomy with stool output, non tender.  Skin: Patient refused perianal examination  Neuro: A&Ox3, no focal deficits    LABS:   Recent Labs     07/04/25  0331 07/05/25  0425   WBC 15.8* 11.9*   HGB 7.2* 7.1*   HCT 22.8* 23.4*   MCV 78.1* 80.6    415        Recent Labs     07/04/25  0331 07/05/25  0425    141   K 3.1* 3.2*   CL 99 107   CO2 20* 22   PHOS 1.4* 2.1*   BUN 3* <2*   CREATININE 0.6* 0.5*        ASSESSMENT & PLAN:   Barbra Cortés is a 25 y.o. male with Hx of adjustment disorder, colonic and perianal Crohn's s/p lap TAC with end ileostomy (11/2023, [facility]) c/b SBO 2/2 ileostomy twisting s/p lap w/ ileostomy revision (4/6/2025 [facility]) and chronic abdominal pain several non-healing wounds, sacral ulceration, and perianal crohns complications (Recent EUA, seton removal [facility]. 5/22/25)  who presents with abdominal pain and CT findings concerning for SBO with possible closed loop near the ileostomy. Discussed with radiologist on-call: there is no bowel wall thickening, free fluid, or signs of ischemia.      Reportedly having ostomy output making SBO less likely  Patient refusing abdominal or perianal exam and would not allow visualization of ostomy bag today    
General Surgery   Daily Progress Note  Patient: Barbra Cortés      CC: Concern for SBO         SUBJECTIVE:   Patient resting in bed this morning had no acute issues overnight. His ostomy is functional. Tolerating diet. He has been refused additional IV placements for contrast    OBJECTIVE:    PHYSICAL EXAM:    Vitals:    07/06/25 0006 07/06/25 0309 07/06/25 0425 07/06/25 0600   BP: 104/65 (!) 134/99     Pulse: (!) 104 (!) 109     Resp: 18 20 18    Temp: 98.7 °F (37.1 °C) 98.4 °F (36.9 °C)     TempSrc: Oral Oral     SpO2: 100% 100%     Weight:    66.3 kg (146 lb 2.6 oz)   Height:           General appearance: alert, no acute distress  Chest/Lungs: normal effort with no accessory muscle use, no signs of respiratory distress, on RA  Cardiovascular: regular rate  Abdomen: Ileostomy with stool output, non tender.  Skin: Patient refused perianal examination  Neuro: A&Ox3, no focal deficits    LABS:   Recent Labs     07/05/25  0425 07/06/25  0525   WBC 11.9* 13.1*   HGB 7.1* 7.8*   HCT 23.4* 25.2*   MCV 80.6 78.1*    418        Recent Labs     07/05/25  0425 07/06/25  0525    137   K 3.2* 3.3*    103   CO2 22 22   PHOS 2.1* 2.0*   BUN <2* <2*   CREATININE 0.5* 0.5*        ASSESSMENT & PLAN:   Barbra Cortés is a 25 y.o. male with Hx of adjustment disorder, colonic and perianal Crohn's s/p lap TAC with end ileostomy (11/2023, [facility]) c/b SBO 2/2 ileostomy twisting s/p lap w/ ileostomy revision (4/6/2025 [facility]) and chronic abdominal pain several non-healing wounds, sacral ulceration, and perianal crohns complications (Recent EUA, seton removal [facility]. 5/22/25)  who presents with abdominal pain and CT findings concerning for SBO with possible closed loop near the ileostomy. Repeat CT scan (7/5) w/o evidence of obstruction and no perirectal abscesses     Recommend transfer to [facility] for continuity of care, patient declines  Low suspicion for SBO  No planned intervention this 
General Surgery   Daily Progress Note  Patient: Barbra Cortés      CC: Concern for SBO         SUBJECTIVE:   Patient resting in bed this morning had no acute issues overnight. His ostomy is functional. Voiding. Tolerating some diet. Complains of foot numbness.    OBJECTIVE:    PHYSICAL EXAM:    Vitals:    07/03/25 2104 07/03/25 2321 07/04/25 0356 07/04/25 0459   BP: (!) 141/88 (!) 164/105 134/89    Pulse: (!) 130 (!) 142 (!) 137    Resp: 18 18     Temp: 99.3 °F (37.4 °C) 98.8 °F (37.1 °C) 98.8 °F (37.1 °C)    TempSrc: Oral Oral Oral    SpO2: 95% 96% 100%    Weight:    57.8 kg (127 lb 6.8 oz)   Height:           General appearance: alert, no acute distress  Chest/Lungs: normal effort with no accessory muscle use, no signs of respiratory distress, on RA  Cardiovascular: tachycardic   Abdomen: Ileostomy with stool output, non tender.  Skin: Patient refused perianal examination  Neuro: A&Ox3, no focal deficits    LABS:   Recent Labs     07/03/25  1041 07/04/25  0331   WBC 11.3* 15.8*   HGB 7.9* 7.2*   HCT 25.1* 22.8*   MCV 76.6* 78.1*    435        Recent Labs     07/03/25  1041 07/03/25  2054 07/04/25  0331   * 135* 136   K 2.6* 3.3* 3.1*   CL 93* 101 99   CO2 26 22 20*   PHOS 1.8*  --  1.4*   BUN 11 6* 3*   CREATININE 0.7* 0.5* 0.6*        Recent Labs     07/01/25  1733 07/02/25  0605   AST 20 19   ALT 8* 9*   BILIDIR  --  <0.1   BILITOT 0.6 0.3   ALKPHOS 139* 94        Recent Labs     07/01/25  1733   LIPASE 139.0*       ASSESSMENT & PLAN:   Barbra Cortés is a 25 y.o. male with Hx of adjustment disorder, colonic and perianal Crohn's s/p lap TAC with end ileostomy (11/2023, [facility]) c/b SBO 2/2 ileostomy twisting s/p lap w/ ileostomy revision (4/6/2025 [facility]) and chronic abdominal pain several non-healing wounds, sacral ulceration, and perianal crohns complications (Recent EUA, seton removal [facility]. 5/22/25)  who presents with abdominal pain and CT findings concerning for SBO with possible closed loop near 
General Surgery   Daily Progress Note  Patient: Barbra Cortés      CC: Concern for SBO         SUBJECTIVE:   Patient resting in bed this morning. No acute events overnight. No reports of emesis. Having some output in his bag. Reports pain is better controlled with medications. He reports some perianal drainage since his seton removal in May.       OBJECTIVE:    PHYSICAL EXAM:    Vitals:    07/02/25 0020 07/02/25 0147 07/02/25 0433 07/02/25 0527   BP: 123/87  118/85    Pulse: (!) 101  90    Resp: 15 16 16 16   Temp: 97.4 °F (36.3 °C)  98 °F (36.7 °C)    TempSrc: Oral  Oral    SpO2: 100%  100%    Weight:       Height:           General appearance: alert, no acute distress  Chest/Lungs: normal effort with no accessory muscle use, no signs of respiratory distress, on RA  Cardiovascular: RR  Abdomen: Patient refused to be examined. Lifted gown. Ostomy is productive with liquid brown stool and gas in bag. Red rubber appeared to be in place.   Skin: Patient refused perianal examination  Neuro: A&Ox3, no focal deficits    LABS:   Recent Labs     07/01/25 1733 07/02/25  0605   WBC 22.1* 12.7*   HGB 9.6* 8.7*   HCT 31.0* 27.8*   MCV 77.6* 77.5*   * 422        Recent Labs     07/01/25 1733 07/02/25  0605   * 128*   K 3.9 4.4   CL 80* 88*   CO2 26 25   PHOS 4.8  --    BUN 38* 32*   CREATININE 2.5* 1.8*        Recent Labs     07/01/25 1733 07/02/25  0605   AST 20 19   ALT 8* 9*   BILIDIR  --  <0.1   BILITOT 0.6 0.3   ALKPHOS 139* 94        Recent Labs     07/01/25 1733   LIPASE 139.0*       ASSESSMENT & PLAN:   Barbra Cortés is a 25 y.o. male with Hx of adjustment disorder, colonic and perianal Crohn's s/p lap TAC with end ileostomy (11/2023, [facility]) c/b SBO 2/2 ileostomy twisting s/p lap w/ ileostomy revision (4/6/2025 [facility]) and chronic abdominal pain several non-healing wounds, sacral ulceration, and perianal crohns complications (Recent EUA, seton removal [facility]. 5/22/25)  who presents with abdominal pain and 
General Surgery   Daily Progress Note  Patient: Barbra Cortés      CC: Concern for SBO         SUBJECTIVE:   Patient resting in bed this morning. Reports he has mostly chosen to have CLD despite asking for diet advancement yesterday. Denies nausea or emesis. Continues to report stable abdominal pain. Asked him about urine and ostomy output due to minimal recorded by nursing. Urinal bedside with urine within it. Nurse yesterday morning reported over 500cc of urine. Patient states that his bag has been emptied a few times and had a decent amount of output each time. He refused to be examined or show us his ostomy bag.       OBJECTIVE:    PHYSICAL EXAM:    Vitals:    07/02/25 2257 07/02/25 2319 07/03/25 0131 07/03/25 0317   BP:  137/82  127/78   Pulse:  (!) 124 (!) 110 (!) 115   Resp: 16 16 16 16   Temp:  99.1 °F (37.3 °C)  97.9 °F (36.6 °C)   TempSrc:  Oral  Oral   SpO2:  98%  97%   Weight:       Height:           General appearance: alert, no acute distress  Chest/Lungs: normal effort with no accessory muscle use, no signs of respiratory distress, on RA  Cardiovascular: tachycardic   Abdomen: Patient refused to be examined or lift gown  Skin: Patient refused perianal examination  Neuro: A&Ox3, no focal deficits    LABS:   Recent Labs     07/01/25 1733 07/02/25 0605   WBC 22.1* 12.7*   HGB 9.6* 8.7*   HCT 31.0* 27.8*   MCV 77.6* 77.5*   * 422        Recent Labs     07/01/25 1733 07/02/25 0605 07/02/25 2016   * 128*  --    K 3.9 4.4  --    CL 80* 88*  --    CO2 26 25  --    PHOS 4.8 3.1 2.3*   BUN 38* 32*  --    CREATININE 2.5* 1.8*  --         Recent Labs     07/01/25 1733 07/02/25 0605   AST 20 19   ALT 8* 9*   BILIDIR  --  <0.1   BILITOT 0.6 0.3   ALKPHOS 139* 94        Recent Labs     07/01/25 1733   LIPASE 139.0*       ASSESSMENT & PLAN:   Barbra Cortés is a 25 y.o. male with Hx of adjustment disorder, colonic and perianal Crohn's s/p lap TAC with end ileostomy (11/2023, [facility]) c/b SBO 2/2 
General Surgery   Daily Progress Note  Patient: Barbra Cortés      CC: Concern for SBO       SUBJECTIVE:   Patient continues to endorse stable abdominal pain. Ostomy is functional. Tolerating diet.    OBJECTIVE:    PHYSICAL EXAM:    Vitals:    07/07/25 1931 07/07/25 2353 07/08/25 0034 07/08/25 0401   BP: 110/68 113/77  102/66   Pulse: (!) 108 (!) 107  (!) 108   Resp: 14 16 16 16   Temp: 99 °F (37.2 °C) 98.9 °F (37.2 °C)  98.7 °F (37.1 °C)   TempSrc: Oral Oral  Axillary   SpO2: 99% 100%  98%   Weight:    67.3 kg (148 lb 5.9 oz)   Height:           General appearance: alert, no acute distress  Chest/Lungs: normal effort with no accessory muscle use, no signs of respiratory distress, on RA  Cardiovascular: regular rate  Abdomen: Ileostomy with stool output, non tender.  Skin: Patient refused perianal examination  Neuro: A&Ox3, no focal deficits    LABS:   Recent Labs     07/06/25  0525 07/07/25  0657   WBC 13.1* 16.1*   HGB 7.8* 8.4*   HCT 25.2* 27.0*   MCV 78.1* 78.8*    449        Recent Labs     07/06/25  0525 07/07/25  0657    135*   K 3.3* 4.6    102   CO2 22 22   PHOS 2.0* 2.1*   BUN <2* 3*   CREATININE 0.5* 0.5*        ASSESSMENT & PLAN:   Barbra Cortés is a 25 y.o. male with Hx of adjustment disorder, colonic and perianal Crohn's s/p lap TAC with end ileostomy (11/2023, [facility]) c/b SBO 2/2 ileostomy twisting s/p lap w/ ileostomy revision (4/6/2025 [facility]) and chronic abdominal pain several non-healing wounds, sacral ulceration, and perianal crohns complications (Recent EUA, seton removal [facility]. 5/22/25)  who presents with abdominal pain and CT findings concerning for SBO with possible closed loop near the ileostomy. Repeat CT scan (7/5) w/o evidence of obstruction and no perirectal abscesses     Recommend transfer to [facility] for continuity of care, patient declines  Low suspicion for SBO  No planned intervention this admission for his chronic, severe perianal 
General Surgery   Daily Progress Note  Patient: Barbra Cortés      CC: Concern for SBO       SUBJECTIVE:   Patient continues to endorse stable abdominal pain. Ostomy still functioning. Tolerating diet.    OBJECTIVE:    PHYSICAL EXAM:    Vitals:    07/09/25 0330 07/09/25 0402 07/09/25 0649 07/09/25 0754   BP: 126/87   124/85   Pulse: (!) 106   (!) 112   Resp: 16 18  16   Temp: 98.3 °F (36.8 °C)   98 °F (36.7 °C)   TempSrc: Axillary   Oral   SpO2: 98%   99%   Weight:   67.6 kg (149 lb 0.5 oz)    Height:           General appearance: alert, no acute distress  Chest/Lungs: normal effort with no accessory muscle use, no signs of respiratory distress, on RA  Cardiovascular: regular rate  Abdomen: declined exam today  Skin: Patient refused perianal examination  Neuro: A&Ox3, no focal deficits    LABS:   Recent Labs     07/07/25  0657 07/08/25  1613   WBC 16.1* 12.6*   HGB 8.4* 7.5*   HCT 27.0* 24.7*   MCV 78.8* 79.0*    432        Recent Labs     07/07/25  0657 07/08/25  1613 07/09/25  0511   * 136 139   K 4.6 3.5 3.7    103 106   CO2 22 22 25   PHOS 2.1* 2.9  --    BUN 3* 5* 7   CREATININE 0.5* 0.5* 0.6*        ASSESSMENT & PLAN:   Barbra Cortés is a 25 y.o. male with Hx of adjustment disorder, colonic and perianal Crohn's s/p lap TAC with end ileostomy (11/2023, [facility]) c/b SBO 2/2 ileostomy twisting s/p lap w/ ileostomy revision (4/6/2025 [facility]) and chronic abdominal pain several non-healing wounds, sacral ulceration, and perianal crohns complications (Recent EUA, seton removal [facility]. 5/22/25)  who presents with abdominal pain and CT findings concerning for SBO with possible closed loop near the ileostomy. Repeat CT scan (7/5) w/o evidence of obstruction and no perirectal abscesses     [facility], patient declined and no bed previously available  Initially concerned for possible SBO but clinically having output and repeat scan 
General Surgery  Interval Note:    Patient seen for serial abdominal exam. Patient stoma with gas and moderate brown effluent in appliance. Red rubber in os. Remains moderately tender in LLQ. No rebound or guarding.       Plan:    - continue serial exams    Martir Copeland DO   PGY2, General Surgery  07/02/25  2:57 AM  [facility] Surgery: Blue Team   Pager: [phone]     
General Surgery  Interval Note:    Presented to bedside for serial abdominal exam. Abdominal pain is stable. Ostomy bag was changed prior to exam, and patient endorses continued output. Patient reports new leg numbness bilaterally but attributes this to sitting in one position. He said his legs felt slightly better after declining his legs and switching positons.       Plan:    - continue serial exams     Frederic Armenta DO   PGY1, General Surgery  07/04/25  12:30 AM  [facility]: Blue Team       
ID Follow-up NOTE    CC:   Abdominal pain  Antibiotics: Vancomycin, Cefepime, Metronidazole    Admit Date: 2025  Hospital Day: 14    Subjective:     Patient reports he feels poorly -  abd and sacral pain.  Admits he never feels good.        Objective:     Tm 100.3 -  at 2310    Patient Vitals for the past 8 hrs:   BP Temp Temp src Pulse Resp SpO2 Weight   25 1006 (!) 156/92 99.1 °F (37.3 °C) Oral -- 16 98 % --   25 0611 -- -- -- (!) 121 -- -- --   25 0552 -- -- -- -- 16 -- --   25 0522 -- -- -- -- 16 -- --   25 0410 (!) 137/90 99.1 °F (37.3 °C) Oral (!) 124 16 96 % 60.7 kg (133 lb 13.1 oz)     I/O last 3 completed shifts:  In: 2650 [P.O.:2650]  Out: 5550 [Urine:5250; Stool:300]  I/O this shift:  In: -   Out: 400 [Urine:400]    EXAM:  GENERAL: No apparent distress.  RA  HEENT: Membranes moist, no oral lesion  NECK:  Supple, no lymphadenopathy  LUNGS: Clear b/l, no rales, no dullness  CARDIAC: RRR, no murmur appreciated  ABD:  + BS, soft, diffusely tender, ileostomy  EXT:  No rash, no edema, no lesions  Buttock / perineum - pt would not allow exam  NEURO: No focal neurologic findings  PSYCH: Orientation, sensorium, mood normal  LINES:  R midline, placed      Perineum wound seen 7/10 -      Data Review:  Lab Results   Component Value Date    WBC 14.7 (H) 2025    HGB 7.5 (L) 2025    HCT 23.8 (L) 2025    MCV 78.5 (L) 2025     2025     Lab Results   Component Value Date    CREATININE 1.3 2025    BUN 6 (L) 2025     2025    K 2.7 (LL) 2025     2025    CO2 23 2025       Hepatic Function Panel:   Lab Results   Component Value Date/Time    ALKPHOS 94 2025 06:05 AM    ALT 9 2025 06:05 AM    AST 19 2025 06:05 AM    BILITOT 0.3 2025 06:05 AM    BILIDIR <0.1 2025 06:05 AM    IBILI 0.2 2025 06:05 AM       MICRO:   BC x 1 Staphylococcus epidermidis    IMAGIN/1 a/P 
ID Follow-up NOTE    CC:   Abdominal pain  Antibiotics: Vancomycin, Zosyn    Admit Date: 2025  Hospital Day: 11    Subjective:     Patient reports less abd and sacral pain.  Eating 'some '      Objective:     Afebrile    Patient Vitals for the past 8 hrs:   BP Temp Temp src Pulse Resp SpO2 Weight   25 1140 (!) 135/93 98.6 °F (37 °C) Oral (!) 116 -- 100 % --   25 1032 -- -- -- -- 16 -- --   25 0819 -- -- -- -- 18 -- --   25 0814 (!) 130/92 98.8 °F (37.1 °C) Oral (!) 126 18 99 % --   25 0646 -- -- -- -- 18 -- --   25 0600 -- -- -- -- -- -- 61.2 kg (134 lb 14.7 oz)     I/O last 3 completed shifts:  In: 1950 [P.O.:1950]  Out: 4360 [Urine:4030; Stool:330]  I/O this shift:  In: -   Out: 550 [Urine:550]    EXAM:  GENERAL: No apparent distress.  RA  HEENT: Membranes moist, no oral lesion  NECK:  Supple, no lymphadenopathy  LUNGS: Clear b/l, no rales, no dullness  CARDIAC: RRR, no murmur appreciated  ABD:  + BS, soft, diffusely tender, ileostomy  EXT:  No rash, no edema, no lesions  Buttock / perineum - pt would not allow exam  NEURO: No focal neurologic findings  PSYCH: Orientation, sensorium, mood normal  LINES:  Peripheral iv     Perineum wound seen 7/10 -      Data Review:  Lab Results   Component Value Date    WBC 12.3 (H) 2025    HGB 6.9 (LL) 2025    HCT 22.2 (L) 2025    MCV 77.9 (L) 2025     2025     Lab Results   Component Value Date    CREATININE 0.9 2025    BUN 3 (L) 2025     2025    K 3.4 (L) 2025     2025    CO2 23 2025       Hepatic Function Panel:   Lab Results   Component Value Date/Time    ALKPHOS 94 2025 06:05 AM    ALT 9 2025 06:05 AM    AST 19 2025 06:05 AM    BILITOT 0.3 2025 06:05 AM    BILIDIR <0.1 2025 06:05 AM    IBILI 0.2 2025 06:05 AM       MICRO:   BC x 1 Staphylococcus epidermidis    IMAGIN/1 a/P CT   1. The patient is status post 
ID Follow-up NOTE    CC:   Abdominal pain  Antibiotics: Vancomycin, Zosyn    Admit Date: 2025  Hospital Day: 12    Subjective:     Patient reports he feels poorly -  abd and sacral pain.  Admits he never feels good.        Objective:     Afebrile    Patient Vitals for the past 8 hrs:   BP Temp Temp src Pulse Resp SpO2   25 1602 (!) 138/96 99 °F (37.2 °C) Oral (!) 133 20 100 %   25 1405 -- -- -- -- 18 --   25 1202 136/84 99.6 °F (37.6 °C) Oral (!) 132 16 98 %   25 1115 -- -- -- -- 16 --     I/O last 3 completed shifts:  In: 2907.5 [P.O.:2520; Blood:387.5]  Out: 6075 [Urine:6075]  I/O this shift:  In: 810 [P.O.:810]  Out: 0 [Urine:2050]    EXAM:  GENERAL: No apparent distress.  RA  HEENT: Membranes moist, no oral lesion  NECK:  Supple, no lymphadenopathy  LUNGS: Clear b/l, no rales, no dullness  CARDIAC: RRR, no murmur appreciated  ABD:  + BS, soft, diffusely tender, ileostomy  EXT:  No rash, no edema, no lesions  Buttock / perineum - pt would not allow exam  NEURO: No focal neurologic findings  PSYCH: Orientation, sensorium, mood normal  LINES:  Peripheral iv     Perineum wound seen 7/10 -      Data Review:  Lab Results   Component Value Date    WBC 18.0 (H) 2025    HGB 8.4 (L) 2025    HCT 26.8 (L) 2025    MCV 78.3 (L) 2025     2025     Lab Results   Component Value Date    CREATININE 1.2 2025    BUN 6 (L) 2025     2025    K 3.1 (L) 2025     2025    CO2 25 2025       Hepatic Function Panel:   Lab Results   Component Value Date/Time    ALKPHOS 94 2025 06:05 AM    ALT 9 2025 06:05 AM    AST 19 2025 06:05 AM    BILITOT 0.3 2025 06:05 AM    BILIDIR <0.1 2025 06:05 AM    IBILI 0.2 2025 06:05 AM       MICRO:   BC x 1 Staphylococcus epidermidis    IMAGIN/1 a/P CT   1. The patient is status post subtotal colectomy with creation of a Macrina's pouch and right lower 
ID Follow-up NOTE    CC:   Abdominal pain  Antibiotics: Vancomycin, Zosyn    Admit Date: 2025  Hospital Day: 9    Subjective:     Patient reports ongoing abd and sacral pain.  Pain less than on admission.      Objective:     Patient Vitals for the past 8 hrs:   BP Temp Temp src Pulse Resp SpO2 Weight   25 0754 124/85 98 °F (36.7 °C) Oral (!) 112 16 99 % --   25 0649 -- -- -- -- -- -- 67.6 kg (149 lb 0.5 oz)   25 0402 -- -- -- -- 18 -- --   25 0330 126/87 98.3 °F (36.8 °C) Axillary (!) 106 16 98 % --     I/O last 3 completed shifts:  In: 1560 [P.O.:1560]  Out: 3575 [Urine:3075; Stool:500]  No intake/output data recorded.    EXAM:  GENERAL: No apparent distress.    HEENT: Membranes moist, no oral lesion  NECK:  Supple, no lymphadenopathy  LUNGS: Clear b/l, no rales, no dullness  CARDIAC: RRR, no murmur appreciated  ABD:  + BS, soft, diffusely tender, ileostomy  EXT:  No rash, no edema, no lesions  Buttock / perineum - pt would not allow exam  NEURO: No focal neurologic findings  PSYCH: Orientation, sensorium, mood normal  LINES:  Peripheral iv       Data Review:  Lab Results   Component Value Date    WBC 12.6 (H) 2025    HGB 7.5 (L) 2025    HCT 24.7 (L) 2025    MCV 79.0 (L) 2025     2025     Lab Results   Component Value Date    CREATININE 0.6 (L) 2025    BUN 7 2025     2025    K 3.7 2025     2025    CO2 25 2025       Hepatic Function Panel:   Lab Results   Component Value Date/Time    ALKPHOS 94 2025 06:05 AM    ALT 9 2025 06:05 AM    AST 19 2025 06:05 AM    BILITOT 0.3 2025 06:05 AM    BILIDIR <0.1 2025 06:05 AM    IBILI 0.2 2025 06:05 AM       MICRO:   BC x 1 Staphylococcus epidermidis    IMAGIN/1 a/P CT   1. The patient is status post subtotal colectomy with creation of a Macrina's pouch and right lower quadrant ileostomy. There is a focally dilated loop of 
ID Follow-up NOTE    CC:   Abdominal pain  Antibiotics: Vancomycin, Zosyn    Admit Date: 7/1/2025  Hospital Day: 10    Subjective:     Patient reports less abd and sacral pain.  Eating 'some '      Objective:     Tm 99.2    Patient Vitals for the past 8 hrs:   BP Temp Temp src Pulse Resp SpO2 Weight   07/10/25 0849 (!) 147/101 -- -- (!) 156 -- -- --   07/10/25 0737 (!) 137/90 98.9 °F (37.2 °C) Oral (!) 146 18 100 % --   07/10/25 0714 -- -- -- -- 18 -- --   07/10/25 0629 -- -- -- -- -- -- 61.2 kg (134 lb 14.7 oz)   07/10/25 0620 125/77 99.2 °F (37.3 °C) Oral (!) 142 18 97 % --   07/10/25 0540 -- -- -- (!) 135 -- -- --   07/10/25 0400 -- -- -- (!) 131 -- -- --   07/10/25 0233 -- -- -- (!) 141 -- -- --   07/10/25 0130 -- -- -- (!) 137 17 -- --     I/O last 3 completed shifts:  In: 1500 [P.O.:1500]  Out: 5430 [Urine:4630; Stool:800]  No intake/output data recorded.    EXAM:  GENERAL: No apparent distress.  RA  HEENT: Membranes moist, no oral lesion  NECK:  Supple, no lymphadenopathy  LUNGS: Clear b/l, no rales, no dullness  CARDIAC: RRR, no murmur appreciated  ABD:  + BS, soft, diffusely tender, ileostomy  EXT:  No rash, no edema, no lesions  Buttock / perineum - pt would not allow exam  NEURO: No focal neurologic findings  PSYCH: Orientation, sensorium, mood normal  LINES:  Peripheral iv       Data Review:  Lab Results   Component Value Date    WBC 12.8 (H) 07/10/2025    HGB 7.2 (L) 07/10/2025    HCT 22.9 (L) 07/10/2025    MCV 77.4 (L) 07/10/2025     07/10/2025     Lab Results   Component Value Date    CREATININE 0.5 (L) 07/10/2025    BUN 5 (L) 07/10/2025     07/10/2025    K 3.7 07/10/2025     07/10/2025    CO2 24 07/10/2025       Hepatic Function Panel:   Lab Results   Component Value Date/Time    ALKPHOS 94 07/02/2025 06:05 AM    ALT 9 07/02/2025 06:05 AM    AST 19 07/02/2025 06:05 AM    BILITOT 0.3 07/02/2025 06:05 AM    BILIDIR <0.1 07/02/2025 06:05 AM    IBILI 0.2 07/02/2025 06:05 AM 
ID Follow-up NOTE    CC:   Abdominal pain, sacral wound infection  Antibiotics: Vancomycin, Cefepime, Metronidazole    Admit Date: 2025  Hospital Day: 15    Subjective:     Patient reports his legs hurt.  Ongoing  abd and sacral pain.  Admits he never feels good.        Objective:     Tm 99.3 on   Tm 100.3 -  at 2310    Patient Vitals for the past 8 hrs:   BP Temp Temp src Pulse Resp SpO2 Weight   07/15/25 0917 (!) 132/90 98.9 °F (37.2 °C) Oral (!) 124 16 98 % --   07/15/25 0423 -- -- -- -- -- -- 60.4 kg (133 lb 2.5 oz)   07/15/25 0339 (!) 129/91 98.5 °F (36.9 °C) Oral (!) 116 18 97 % --     I/O last 3 completed shifts:  In:  [P.O.:]  Out: 7950 [Urine:6950; Stool:700; Blood:300]  No intake/output data recorded.    EXAM:  GENERAL: No apparent distress.  RA  HEENT: Membranes moist, no oral lesion  NECK:  Supple, no lymphadenopathy  LUNGS: Clear b/l, no rales, no dullness  CARDIAC: RRR, no murmur appreciated  ABD:  + BS, soft, diffusely tender, ileostomy  EXT:  No rash, no edema, no lesions  Buttock / perineum - pt would not allow exam  NEURO: No focal neurologic findings  PSYCH: Orientation, sensorium, mood normal  LINES:  R midline, placed      Perineum wound seen 7/10 -      Data Review:  Lab Results   Component Value Date    WBC 14.7 (H) 2025    HGB 7.5 (L) 2025    HCT 23.8 (L) 2025    MCV 78.5 (L) 2025     2025     Lab Results   Component Value Date    CREATININE 1.3 2025    BUN 6 (L) 2025     2025    K 3.0 (L) 2025     2025    CO2 23 2025       Hepatic Function Panel:   Lab Results   Component Value Date/Time    ALKPHOS 94 2025 06:05 AM    ALT 9 2025 06:05 AM    AST 19 2025 06:05 AM    BILITOT 0.3 2025 06:05 AM    BILIDIR <0.1 2025 06:05 AM    IBILI 0.2 2025 06:05 AM       MICRO:   BC x 1 Staphylococcus epidermidis    IMAGIN/1 a/P CT   1. The patient is status 
ID Follow-up NOTE    CC:   abdominal pain  Antibiotics: Vancomycin, pip-tazo    Admit Date: 7/1/2025  Hospital Day: 7    Subjective:     Patient refusing exam of sacral and perineum region, afebrile and ostomy functional, states abd pain better.       Objective:     Patient Vitals for the past 8 hrs:   BP Temp Temp src Pulse Resp SpO2 Weight   07/07/25 0830 132/62 98.2 °F (36.8 °C) Oral (!) 142 16 98 % --   07/07/25 0433 116/77 98.9 °F (37.2 °C) Axillary (!) 126 18 100 % 64.5 kg (142 lb 3.2 oz)     I/O last 3 completed shifts:  In: 420 [P.O.:420]  Out: 4415 [Urine:3290; Stool:1125]  No intake/output data recorded.    EXAM:  GENERAL: No apparent distress, ill-appearing, thin male  HEENT: Membranes moist, no oral lesion  NECK:  Supple, no lymphadenopathy  LUNGS: Clear b/l, no rales, no dullness, On room air  CARDIAC: RRR, no murmur appreciated  ABD:  + BS, soft, ostomy in place with liquid stool output, not tender, patient refused further exam of perineum region  EXT:  No rash, no edema, no lesions  NEURO: No focal neurologic findings  PSYCH: Orientation normal, flat affect   LINES:  Peripheral iv       Data Review:  Lab Results   Component Value Date    WBC 16.1 (H) 07/07/2025    HGB 8.4 (L) 07/07/2025    HCT 27.0 (L) 07/07/2025    MCV 78.8 (L) 07/07/2025     07/07/2025     Lab Results   Component Value Date    CREATININE 0.5 (L) 07/07/2025    BUN 3 (L) 07/07/2025     (L) 07/07/2025    K 4.6 07/07/2025     07/07/2025    CO2 22 07/07/2025       Hepatic Function Panel:   Lab Results   Component Value Date/Time    ALKPHOS 94 07/02/2025 06:05 AM    ALT 9 07/02/2025 06:05 AM    AST 19 07/02/2025 06:05 AM    BILITOT 0.3 07/02/2025 06:05 AM    BILIDIR <0.1 07/02/2025 06:05 AM    IBILI 0.2 07/02/2025 06:05 AM       MICRO:    Blood cultures x 2 7/1: 1 set positive for staph epi    IMAGING: I have independently reviewed the images and reports.       CT abdomen and pelvis without contrast 7/5:  1. There is 
Lab was able to collect lavender top (CBC) but unable to collect enough blood for the green top (chem). Another  will be returning to attempt to collect BMP  
NP notified of patient refusing oral potassium.   
Occupational Therapy  Facility/Department: [address]  Occupational Therapy Initial Assessment and Treatment     Name: Barbra Cortés  : 2000  MRN: [id number]  Date of Service: 2025    Discharge Recommendations:  Subacute/Skilled Nursing Facility, Continue to assess pending progress  OT Equipment Recommendations  Equipment Needed: No       Patient Diagnosis(es): The primary encounter diagnosis was Dehydration. Diagnoses of SIRS (systemic inflammatory response syndrome) (HCC) and Generalized abdominal pain were also pertinent to this visit.  Past Medical History:  has no past medical history on file.  Past Surgical History:  has no past surgical history on file.    Treatment Diagnosis: Impaired ADL status; Impaired functional mobility      Assessment  Performance deficits / Impairments: Decreased functional mobility ;Decreased ADL status;Decreased strength;Decreased safe awareness;Decreased cognition;Decreased endurance;Decreased balance;Decreased posture  Assessment: Pt admitted from [facility], reportedly able to complete ADLs with minimal assistance while staying at [facility]. Pt appears to be functioning below baseline this date as he required min A > max A of 2 for bed mobility. Pt declining mobility other than rolling this date despite extensive education provided on importance of OOB mobility while admitted to the hospital. Pt limited by decreased interest in participation, RN aware. Pt agitated during session but did tolerate UE exercises fairly. Recommend SNF upon discharge to improve mobility, pending pt progress with acute OT. Will continue to assess.  Treatment Diagnosis: Impaired ADL status; Impaired functional mobility  Prognosis: Guarded  Decision Making: Medium Complexity  REQUIRES OT FOLLOW-UP: Yes  Activity Tolerance  Activity Tolerance: Treatment limited secondary to agitation  Activity Tolerance Comments: Pt declining to participate in several therapy activities this date- 
Occupational Therapy/Physical Therapy Attempt    Pt supine in bed. Pt declining therapy all therapeutic activities at this time despite encouragement/education d/t pain. Will follow up as schedule permits and medically appropriate.     Ellen Toure, OTR/L  Kamla Childs PT, DPT [id number]   
Patient c/o new numbness and tingling to BLE. Md made aware, N.O for gabapentin and calcium carbonate. Electrolytes replaced and ineffective. MD made aware of medication ineffectiveness, acknowledge.   
Patient discharged to facility with all belongings, peripheral IV removed but midline to stay in for IVABX. Extra ostomy supplies sent with patient as well. Patient refused cleanup/dressing change before transport. Report called to facility.   
Patient is refusing all medications including replacement potassium and his metoprolol for high heart rate. Allowed vitals but not assessment and still refusing wound care.   
Patient is refusing all medications, vitals, and assessment. This RN educated patient on importance of all 3 and patient continues to refuse.   
Patient now agreeable to meds and vitals. Still refusing dressing change to sacrum and PO pain medicine. Reiterated to patient that he needs to try to take oral before IV, continues to refuse. Lungs clear, bowel sounds active. Ostomy is CDI with large output this afternoon, brown, liquid. Voiding adequately, urine studies sent to lab. Midline dressing is CDI and patient is receiving replacement potassium and magnesium. Turned and repositioned a few times but can often refuse. Tolerating diet. Fall precautions in place.   
Patient participated in head-to-toe exam with exception of wound treatment and assessment. Patient educated on importance of wound management but still declined assessment. Patient requested RN \"look at them later.\"   
Patient refusing dressing change this AM. Educated on importance of cleanliness and wound care, still refusing.   
Patient refusing labs at this time. States he will let us collect after he sleeps in till 0930 at least. Pt irritable.  
Patient refusing vitals, medications, assessment and turns with this RN and another RN. Education provided and patient still refusing. Hospitalist aware.   
Patient running Zosyn right now which is not compatible with LR fluids, refusing another IV access point to run both. Will start fluids when Zosyn is done. Refusing turning at repositioning still this AM  
Physical Therapy  Daily Treatment Note    Discharge Recommendation:  Skilled Nursing Facility  Equipment Needs:  Defer to next level of care    Assessment:  Pt tolerating only minimal activity this session. Discomfort and self-limiting behaviors are hindering progress. Pt would benefit from continued IP PT if he's agreeable to participating. Plan is for SNF.     Chart Reviewed: Yes   Restrictions/Precautions: Contact Precautions     Additional Pertinent Hx: 25-year-old male with history of multiple comorbidities to include fistulizing and metastatic cutaneous Crohn's disease status post loop diverting ileostomy and total proctocolectomy since 11/2023, history of TPN use, sacral decubitus ulcerations,  SBO secondary to ileostomy twisting status post lap with ileostomy revision performed on 4/2025, recent EUA with seton removal at [facility] on 5/2025 who presented from nursing home on 7/1 for complaints of abdominal pain and nausea and poor appetite.  Concerns for SBO on CT scan on admission with possible closed-loop near the ileostomy.  However repeat CT on 7/5 without evidence of obstruction.  General surgery on board, no surgical intervention at this time.  Patient was also found to have stage IV sacral ulcer and is currently on Vanco and Zosyn.      Diagnosis: Dehydration  Treatment Diagnosis: decreased fucntional mobility    Subjective: Pt in bed. Agreeable to only minimal activity after heavy encouragement.   \"I can't do anything. My legs are numb.\" When pt asked to clarify, pt stating that he does have discomfort and that \"they just don't feel right.\"      Pain: c/o pain B LEs/feet. Denies wanting to ask for pain medication. RN aware.     Objective:    Exercises  5 reps B hip abd/add (mod assist)  5 reps L SAQ (max assist). Pt adamantly declining to perform on R LE.    Bed mobility  Dependent (Max assist x 2) to scoot towards HOB in supine.    Patient Education  Lengthy discussion about the importance of mobilizing 
Physical Therapy  Facility/Department: [facility]  Physical Therapy Initial Assessment    Name: Barbra Cortés  : 2000  MRN: [id number]  Date of Service: 2025    Discharge Recommendations:  Subacute/Skilled Nursing Facility   PT Equipment Recommendations  Equipment Needed: No  Other: defer to next level of care      Patient Diagnosis(es): The primary encounter diagnosis was Dehydration. Diagnoses of SIRS (systemic inflammatory response syndrome) (HCC) and Generalized abdominal pain were also pertinent to this visit.  Past Medical History:  has no past medical history on file.  Past Surgical History:  has no past surgical history on file.    Assessment  Assessment: Pt presents to [facility] with abdominal pain and poor appetite. From [facility] with Chron's and ileostomy where he was reportedly refusing care. Pt refusing OOB mobility this date however agreeable to rolling and repositioning with encouragement. Pt would benefit from skilled PT/OT as agreeable in order to increase indep with fucntional mobility and progress towards being  able to return home with his aunt.  Treatment Diagnosis: decreased fucntional mobility  Therapy Prognosis: Good  Decision Making: Medium Complexity  Requires PT Follow-Up: Yes  Activity Tolerance  Activity Tolerance: Patient limited by pain;Treatment limited secondary to decreased cognition    Plan  Physical Therapy Plan  General Plan:  (2-5)  Current Treatment Recommendations: Strengthening, Functional mobility training, Balance training, Transfer training, Gait training  Safety Devices  Type of Devices: Nurse notified, Left in bed, Call light within reach, Bed alarm in place    Restrictions  Restrictions/Precautions  Restrictions/Precautions: Contact Precautions  Activity Level: Up as Tolerated     Subjective  General  Chart Reviewed: Yes  Patient assessed for rehabilitation services?: Yes  Additional Pertinent Hx: 25-year-old male with history of multiple comorbidities to 
Physical Therapy/Occupational Therapy    Chart reviewed.  Attempted to see pt for PT/OT.  Pt found supine, lethargic.  Pt mumbling and difficult to understand.  Declined any activity with PT/OT despite max encouragement.  When pt asked what his goal is he stated \"to walk.\"  Explained role of PT/OT and need to work on moving if this is his goal.  Pt continued to decline any activity at this time.  Will attempt again as schedule allows.    Jayna Flores, PT [id number]  Yumiko Avila OTR/L          
Pt HR sustaining 140s-150s, metoprolol PO scheduled given at 2012. Pt has metoprolol prn >140 sustaining q6h, but it was given at 1846, next dose will be 0046. Pt denies cp or sob. c/o rectum pain 10/10, I gave his morphine q2h prn, refused po pain meds. Pt said morphine barely touch the pain. Pt temp 100.3, /84, RR 16, Spo2 95% on RA. Pt refusing to be turned and check his wound at this time. Notify YOLY Farris.   
Pt refusing labs. Pt says he will think about it and we can maybe draw them later. Pt educated on importance of labs. Pt stated he understood Shara Holly NP made aware. Will continue to educate on importance of labs.   
Pt refusing labs; provider notified   
Pt refusing wound assessments, dressing changes, and most q2 turns. Pt educated on importance and stated understanding.  
RN saw colostomy bag leaking, offered to changed the bag. Pt refused stating \" it still works\" and  requested a towel instead, RN also asked if pt would like to change his bed pad, and assist with helping pt turn. Pt also refused.   
STAT CT ordered. Pt refusing to go down for scan, states he is in too much pain and will try \"maybe later or tomorrow\". Surgery residents notified. Electronically signed by Candida Hawley RN on 7/4/2025 at 1:10 PM    
Spoke with pharmacy, ok to give IV vanc with IV phosphates.   
Stat labs drawn  
Stool visible in colostomy, pt not agreeable to empty it at this time.  
Surgery Update:    Messaged received about patient's perianal wounds. Picture reviewed as patient would not allow surgical team to examine his abdomen or perianal area this morning.     On review of photo, this is most consistent with severe, chronic perianal Crohn's disease as consistent with the patient's history.     Continue to recommend transfer for continuity of care to   No plans for surgical intervention for his chronic perianal Crohn's disease this admission   Having ostomy output and unlikely to be obstructed, may advance to FLD. Patient strongly desires food      Tila Faust MD  PGY5, General Surgery  07/02/25  4:12 PM  [facility]  [phone]    
[facility] -  Clinical Pharmacy Note    Vancomycin - Management by Pharmacy    Consult Date(s): 7/2/25  Consulting Provider(s): BOUCHRA Cassidy    Assessment / Plan  Sepsis - Vancomycin  Concurrent Antimicrobials:  Zosyn   Day of Vanc Therapy / Ordered Duration: 9 of tbd   Current Dosing Method: Bayesian-Guided AUC Dosing  Therapeutic Goal: -600 mg/L*hr  Current Dose / Plan:   Renal function: SCr stable at 0.6.  Documented UOP of 1.6 mL/kg/hr over prior 24 hours   On vancomycin 1250mg IV q12h - dose adjusted 7/7  Regimen predicts AUC = 585 and trough 10.7   Level ordered for tomorrow AM, Thurs 7/10 to confirm kinetic estimates   Will continue to monitor clinical condition and make adjustments to regimen as appropriate    Please call with questions--  Kalli Qureshi, PharmD, [facility]  Wireless: [phone]   7/9/2025 9:45 AM        Interval update:  Multiple changes to psych meds this AM per psych recommendations. Renal function stable today, SCr 0.5.    Subjective/Objective: Barbra Cortés is a 25 y.o. male with a PMHx significant for adjustment disorder, colonic and perineal Crohn's s/p lap TAC with end ileostomy, chronic abdominal pain, sacral wounds who presented with 3day history of abdominal pain. 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    Pharmacy is consulted to dose vancomycin.    Ht Readings from Last 1 Encounters:   07/01/25 1.93 m (6' 4\")     Wt Readings from Last 1 Encounters:   07/09/25 67.6 kg (149 lb 0.5 oz)     Current & Prior Antimicrobial Regimen(s):  Zosyn (7/1-current)  Vancomycin - Pharmacy to dose  1500mg IV x1 7/1  750mg IV q24h (7/2)  750mg IV q12h (7/3-7/6)  1000mg IV q8h (7/6-7/7)  1250mg IV q12h (7/7-current)    Vancomycin Level(s) / Doses:  Date Time Dose Type of Level / Level Interpretation   7/1 2014 1500mg IV x1 --- Loading dose; ~27 mg/kg  ROCÍO - SCr 2.5   7/2 0605 --- Random = 37.7 mg/L Drawn ~10h after load  SCr 
[facility] -  Clinical Pharmacy Note    Vancomycin - Management by Pharmacy    Consult Date(s): 7/2/25  Consulting Provider(s): BOUCHRA Cassidy    Assessment / Plan  Sepsis - Vancomycin  Concurrent Antimicrobials: Zosyn 3.375 g IV q8h EI (Day #5 of 7)  Day of Vanc Therapy / Ordered Duration:Day #5 of 7  Current Dosing Method: Bayesian-Guided AUC Dosing  Therapeutic Goal: -600 mg/L*hr  Current Dose / Plan:   Renal function: SCr back to baseline today at 0.5 mg/dL (baseline SCr ~0.5 mg/dL from June 2025); Documented UOP of 1.3 mL/kg/hr over prior 24 hours   Pt currently on vancomycin 750 mg IV q12h. Timed level ordered for 0800 this morning was cancelled and added on to other labs without approval from pharmacy. Level obtained at incorrect time this AM likely inaccurate. STAT vancomycin level now re-ordered for 1430 to obtain true concentration prior to afternoon dose  Lab notified of need for STAT vancomycin level at this time as well as PCU RN (Lucas). Safecare will be placed due to re-timing of TIMED vancomycin level without discussion with pharmacist dosing vancomycin based on levels  Kinetics predict ssAUC of 482 mg/L*h with ssTr of 10.3 mg/L  Will plan to continue current regimen at this time until vancomycin level drawn as ordered  Will continue to monitor clinical condition and make adjustments to regimen as appropriate    Thank you for consulting pharmacy,    Era Hawley, PharmD, [facility]  Clinical Pharmacy Specialist - Emergency Dept  Wireless: [phone]  7/5/2025 2:20 PM      Interval update:  No changes to vancomycin regimen at this time. Awaiting stat vanc level at this time. PCU RN and lab aware of STAT order as of 7/5/25 at 1433.     Subjective/Objective: Barbra Cortés is a 25 y.o. male with a PMHx significant for adjustment disorder, colonic and perineal Crohn's s/p lap TAC with end ileostomy, chronic abdominal pain, sacral wounds who presented with 3day history of abdominal pain. 
The [facility] -  Clinical Pharmacy Note    Vancomycin - Management by Pharmacy    Consult Date(s): 7/2/25  Consulting Provider(s): BOUCHRA Cassidy    Assessment / Plan  Sepsis - Vancomycin  Concurrent Antimicrobials: Zosyn 3.375 g IV q8h EI (Day #6 of 7); ID now following -> will defer to ID for intended duration  Day of Vanc Therapy / Ordered Duration:Day #6 of 7 -> ID now following -> will defer to ID for intended duration  Current Dosing Method: Bayesian-Guided AUC Dosing  Therapeutic Goal: -600 mg/L*hr  Current Dose / Plan:   Renal function: SCr at/near baseline today at 0.5 mg/dL; Documented UOP of 1.3 mL/kg/hr over prior 24 hours   Pt currently on vancomycin 750 mg IV q12h. Timed level obtained yesterday at 1443 of 4.6 mg/L (correlates to subtherapeutic AUC (AUC <400))  Will plan to change vancomycin regimen to 1 g IV q8h at this time (ordered)  New regimen predicts ssAUC of 507 mg/L*h with ssTr of 12.4 mg/L  Will plan to obtain TIMED level tomorrow AM at 0800 (7/7, ordered). Please do not re-time level or add-on to other AM labs without discussion with clinical pharmacist on 7/7  Will continue to monitor clinical condition and make adjustments to regimen as appropriate    Thank you for consulting pharmacy,    Era Hawley, PharmD, [facility]  Clinical Pharmacy Specialist - Emergency Dept  Wireless: [phone]  7/6/2025 8:11 AM      Interval update:  Change maintenance regimen to vancomycin 1 g IV q8h at this time.     Subjective/Objective: Barbra Cortés is a 25 y.o. male with a PMHx significant for adjustment disorder, colonic and perineal Crohn's s/p lap TAC with end ileostomy, chronic abdominal pain, sacral wounds who presented with 3day history of abdominal pain. 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    Pharmacy is consulted to dose vancomycin.    Ht Readings from Last 1 Encounters:   07/01/25 1.93 m (6' 4\")     Wt Readings from Last 1 
The [facility] -  Clinical Pharmacy Note    Vancomycin - Management by Pharmacy    Consult Date(s): 7/2/25  Consulting Provider(s): BOUCHRA Cassidy    Assessment / Plan  Sepsis - Vancomycin  Concurrent Antimicrobials:  Zosyn   Day of Vanc Therapy / Ordered Duration: 7   Current Dosing Method: Bayesian-Guided AUC Dosing  Therapeutic Goal: -600 mg/L*hr  Current Dose / Plan:   Renal function: SCr at/near baseline today at 0.5 mg/dL  Documented UOP of 1.4 mL/kg/hr over prior 24 hours   On vancomycin 1000mg IV q8h - dose adjusted 7/6  Level today = 15.9 mg/L - drawn ~8h after prior dose  Calculated AUC = 609 and trough = 14.9  Given AUC > 600 - will adjust dose to 1250mg IV q12h  Regimen predicts AUC = 508   Repeat levels will be ordered as appropriate  Will continue to monitor clinical condition and make adjustments to regimen as appropriate    Please call with questions--  Kalli Qureshi, PharmD, BCPS  Wireless: [phone]   7/7/2025 9:39 AM        Interval update:  repeat blood Cx from 7/2 with no growth.    Subjective/Objective: Barbra Cortés is a 25 y.o. male with a PMHx significant for adjustment disorder, colonic and perineal Crohn's s/p lap TAC with end ileostomy, chronic abdominal pain, sacral wounds who presented with 3day history of abdominal pain. 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    Pharmacy is consulted to dose vancomycin.    Ht Readings from Last 1 Encounters:   07/01/25 1.93 m (6' 4\")     Wt Readings from Last 1 Encounters:   07/07/25 64.5 kg (142 lb 3.2 oz)     Current & Prior Antimicrobial Regimen(s):  Zosyn (7/1-current)  Vancomycin - Pharmacy to dose  1500mg IV x1 7/1  750mg IV q24h (7/2)  750mg IV q12h (7/3-7/6)  1000mg IV q8h (7/6-7/7)  1250mg IV q12h (7/7-current)    Vancomycin Level(s) / Doses:  Date Time Dose Type of Level / Level Interpretation   7/1 2014 1500mg IV x1 --- Loading dose; ~27 mg/kg  ROCÍO - SCr 2.5   7/2 
The [facility] -  Clinical Pharmacy Note    Vancomycin - Management by Pharmacy    Consult Date(s): 7/2/25  Consulting Provider(s): BOUCHRA Cassidy    Assessment / Plan  Sepsis - Vancomycin  Concurrent Antimicrobials:  Zosyn   Day of Vanc Therapy / Ordered Duration: 11 of tbd   Current Dosing Method: Bayesian-Guided AUC Dosing  Therapeutic Goal: -600 mg/L*hr  Current Dose / Plan:   Renal function: SCr bumped today, 0.5?0.9.   Documented UOP of 2 mL/kg/hr over prior 24 hours   Will monitor closely  On vancomycin 1250mg IV q12h - dose adjusted 7/7  Level today = 28.9 mg/L - drawn ~9.5h after prior dose  Calculated AUC is now 839 with bump in SCr  Will adjust dose to 1500mg IV q24h   Regimen predicts AUC = 509 and trough 9.6   Will monitor closely given bump in SCr. Repeat levels will be ordered as appropriate  Will continue to monitor clinical condition and make adjustments to regimen as appropriate    Please call with questions--  Kalli Qureshi, PharmD, [facility]  Wireless: [phone]   7/11/2025 10:46 AM        Interval update:  SCr 0.5?0.9 overnight. Documented UOP ~2 mL/kg/hr    Subjective/Objective: Babrra Cortés is a 25 y.o. male with a PMHx significant for adjustment disorder, colonic and perineal Crohn's s/p lap TAC with end ileostomy, chronic abdominal pain, sacral wounds who presented with 3day history of abdominal pain. 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    Pharmacy is consulted to dose vancomycin.    Ht Readings from Last 1 Encounters:   07/01/25 1.93 m (6' 4\")     Wt Readings from Last 1 Encounters:   07/11/25 61.2 kg (134 lb 14.7 oz)     Current & Prior Antimicrobial Regimen(s):  Zosyn (7/1-current)  Vancomycin - Pharmacy to dose  1500mg IV x1 7/1  750mg IV q24h (7/2)  750mg IV q12h (7/3-7/6)  1000mg IV q8h (7/6-7/7)  1250mg IV q12h (7/7-7/11)  1500mg IV q24h (7/11-current)    Vancomycin Level(s) / Doses:  Date Time Dose Type of 
The [facility] -  Clinical Pharmacy Note    Vancomycin - Management by Pharmacy    Consult Date(s): 7/2/25  Consulting Provider(s): BOUCHRA Cassidy    Assessment / Plan  Sepsis - Vancomycin  Concurrent Antimicrobials:  Zosyn   Day of Vanc Therapy / Ordered Duration: 13   Per ID, plan to continue with ertapenem through 7/24/25   Current Dosing Method: Intermittent via Levels  Therapeutic Goal: Trough ~ 15 mg/L  Current Dose / Plan:   Renal function: SCr continues to increase, 0.5 -->0.9 -->1.2 --> 1.3.      UOP continues to be adequate; documented as 2.9 mL/kg/hr over prior 24 hours   Scheduled vancomycin stopped yesterday.  Dosing intermittently via levels for now.    Level today = 27.7 mg/L  Will hold dose today to allow level to wash out.    Plan for a level 7/14 AM to reassess.    ID has created the TWAN - patient may require a different regimen; will follow for now.    Will continue to monitor clinical condition and make adjustments to regimen as appropriate    Please call with questions--  Brittny MosleyD., BCPS   7/13/2025 7:43 AM  Wireless: [phone]          Interval update:  SCr continues to bump up.  Dosing vancomycin via levels for now.      Subjective/Objective: Barbra Cortés is a 25 y.o. male with a PMHx significant for adjustment disorder, colonic and perineal Crohn's s/p lap TAC with end ileostomy, chronic abdominal pain, sacral wounds who presented with 3day history of abdominal pain. 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    Pharmacy is consulted to dose vancomycin.    Ht Readings from Last 1 Encounters:   07/01/25 1.93 m (6' 4\")     Wt Readings from Last 1 Encounters:   07/13/25 61.9 kg (136 lb 7.4 oz)     Current & Prior Antimicrobial Regimen(s):  Zosyn (7/1-current)  Vancomycin - Pharmacy to dose  1500mg IV x1 7/1  750mg IV q24h (7/2)  750mg IV q12h (7/3-7/6)  1000mg IV q8h (7/6-7/7)  1250mg IV q12h (7/7-7/11)  1500mg IV 
[facility] -  Clinical Pharmacy Note    Vancomycin - Management by Pharmacy    Consult Date(s): 7/2/25  Consulting Provider(s): BOUCHRA Cassidy    Assessment / Plan  Sepsis - Vancomycin  Concurrent Antimicrobials:  Zosyn   Day of Vanc Therapy / Ordered Duration: 8   Current Dosing Method: Bayesian-Guided AUC Dosing  Therapeutic Goal: -600 mg/L*hr  Current Dose / Plan:   Renal function: no new labs today; SCr has been stable at 0.5.  Documented UOP of 0.9 mL/kg/hr over prior 24 hours   On vancomycin 1250mg IV q12h - dose adjusted 7/7  Regimen predicts AUC = 484 and trough 9.5   Repeat levels will be ordered as appropriate  Will continue to monitor clinical condition and make adjustments to regimen as appropriate    Please call with questions--  Kalli Qureshi, PharmD, [facility]  Wireless: [phone]   7/8/2025 8:18 AM        Interval update:  Continues to report stable abd pain. Ostomy is functional. Tolerating diet.    Subjective/Objective: Barbra Cortés is a 25 y.o. male with a PMHx significant for adjustment disorder, colonic and perineal Crohn's s/p lap TAC with end ileostomy, chronic abdominal pain, sacral wounds who presented with 3day history of abdominal pain. 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    Pharmacy is consulted to dose vancomycin.    Ht Readings from Last 1 Encounters:   07/01/25 1.93 m (6' 4\")     Wt Readings from Last 1 Encounters:   07/08/25 67.3 kg (148 lb 5.9 oz)     Current & Prior Antimicrobial Regimen(s):  Zosyn (7/1-current)  Vancomycin - Pharmacy to dose  1500mg IV x1 7/1  750mg IV q24h (7/2)  750mg IV q12h (7/3-7/6)  1000mg IV q8h (7/6-7/7)  1250mg IV q12h (7/7-current)    Vancomycin Level(s) / Doses:  Date Time Dose Type of Level / Level Interpretation   7/1 2014 1500mg IV x1 --- Loading dose; ~27 mg/kg  ROCÍO - SCr 2.5   7/2 0605 --- Random = 37.7 mg/L Drawn ~10h after load  SCr 2.5?1.8  Schedule 750mg IV q24h   7/5 
The [facility] -  Clinical Pharmacy Note    Vancomycin - Management by Pharmacy    Consult Date(s): 7/2/25  Consulting Provider(s): BOUCHRA Cassidy    Assessment / Plan  Sepsis / Stage 4 pressure ulcer on sacrum/coccyx - Vancomycin  Concurrent Antimicrobials: Zosyn 3.375 g IV q8h EI (Day #4 of 7)  Day of Vanc Therapy / Ordered Duration:Day #4 of 7  Current Dosing Method: Bayesian-Guided AUC Dosing  Therapeutic Goal: -600 mg/L*hr  Current Dose / Plan:   Renal function: SCr trending back to baseline, SCr of 0.6 mg/dL at this time (baseline SCr ~0.5 mg/dL from June 2025); Documented UOP of 1.1 mL/kg/hr over prior 24 hours   Pt currently on vancomycin 750 mg IV q12h  Kinetics predict ssAUC of 482 mg/L*h with ssTr of 10.3 mg/L  Will plan to continue current regimen at this time  As pt has only received 2 doses of new regimen, will plan to obtain repeat random level tomorrow (7/5 at 0800, ordered) when pt is more likely at steady state  Will continue to monitor clinical condition and make adjustments to regimen as appropriate    Thank you for consulting pharmacy,    Era Hawley, PharmD, [facility]  Clinical Pharmacy Specialist - Emergency Dept  Wireless: [phone]  7/4/2025 12:33 PM      Interval update:  No changes to vancomycin regimen at this time.     Subjective/Objective: Barbra Cortés is a 25 y.o. male with a PMHx significant for adjustment disorder, colonic and perineal Crohn's s/p lap TAC with end ileostomy, chronic abdominal pain, sacral wounds who presented with 3day history of abdominal pain. 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    Pharmacy is consulted to dose vancomycin.    Ht Readings from Last 1 Encounters:   07/01/25 1.93 m (6' 4\")     Wt Readings from Last 1 Encounters:   07/04/25 57.8 kg (127 lb 6.8 oz)     Current & Prior Antimicrobial Regimen(s):  Zosyn (7/1-current)  Vancomycin - Pharmacy to dose  1500mg IV x1 7/1  750mg IV q24h 
The [facility] -  Clinical Pharmacy Note    Vancomycin - Management by Pharmacy    Consult Date(s): 7/2/25  Consulting Provider(s): BOUCHRA Cassidy    Assessment / Plan  Sepsis - Vancomycin  Concurrent Antimicrobials:  Zosyn   Day of Vanc Therapy / Ordered Duration: 10 of tbd   Current Dosing Method: Bayesian-Guided AUC Dosing  Therapeutic Goal: -600 mg/L*hr  Current Dose / Plan:   Renal function: SCr stable at 0.5  Documented UOP of 1.6 mL/kg/hr over prior 24 hours   On vancomycin 1250mg IV q12h - dose adjusted 7/7  Level today = 34.1 mg/L - drawn ~4.5h after prior dose  Calculated AUC = 578 and trough = 12   Continue current dose for now, plan to repeat level in ~48h to ensure pt is not accumulating vancomycin  Will continue to monitor clinical condition and make adjustments to regimen as appropriate    Please call with questions--  Kalli Qureshi, PharmD, BCPS  Wireless: [phone]   7/10/2025 8:03 AM        Interval update:  Tachycardic overnight to 140s.  Remains on Zosyn/Vanc per ID.    Subjective/Objective: Barbra Cortés is a 25 y.o. male with a PMHx significant for adjustment disorder, colonic and perineal Crohn's s/p lap TAC with end ileostomy, chronic abdominal pain, sacral wounds who presented with 3day history of abdominal pain. 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    Pharmacy is consulted to dose vancomycin.    Ht Readings from Last 1 Encounters:   07/01/25 1.93 m (6' 4\")     Wt Readings from Last 1 Encounters:   07/10/25 61.2 kg (134 lb 14.7 oz)     Current & Prior Antimicrobial Regimen(s):  Zosyn (7/1-current)  Vancomycin - Pharmacy to dose  1500mg IV x1 7/1  750mg IV q24h (7/2)  750mg IV q12h (7/3-7/6)  1000mg IV q8h (7/6-7/7)  1250mg IV q12h (7/7-current)    Vancomycin Level(s) / Doses:  Date Time Dose Type of Level / Level Interpretation   7/1 2014 1500mg IV x1 --- Loading dose; ~27 mg/kg  ROCÍO - SCr 2.5   7/2 0605 --- Random 
The [facility] - Clinical Pharmacy Note - Extended Infusion Beta-Lactam Adjustment    Cefepime ordered for treatment of SSTI. Per [facility] Extended Infusion Beta-Lactam Policy, Cefepime will be changed to 2000 mg IV q12h.     Estimated Creatinine Clearance: Estimated Creatinine Clearance: 76 mL/min (based on SCr of 1.3 mg/dL).  Dialysis Status, ROCÍO, CKD: ROCÍO  BMI: Body mass index is 16.29 kg/m².    Rationale for Adjustment: Agent is renally eliminated and demonstrates time-dependent effect on bacterial eradication. Extended-infusion dosing strategy aims to enhance microbiologic and clinical efficacy.    Pharmacy will continue to monitor renal function and adjust dose as necessary.      Please call with questions--  Kalli Qureshi PharmD, BCPS  Wireless: [phone]   7/14/2025 10:40 AM      
The [facility] -  Clinical Pharmacy Note    Vancomycin - Management by Pharmacy    Consult Date(s): 7/2/25  Consulting Provider(s): BOUCHRA Cassidy    Assessment / Plan   Sepsis  - stage 4 pressure ulcer on sacrum/coccyx - Vancomycin  Concurrent Antimicrobials: Zosyn  Day of Vanc Therapy / Ordered Duration: 3 of 7  Current Dosing Method: Bayesian-Guided AUC Dosing  Therapeutic Goal: -600 mg/L*hr  Current Dose / Plan:   Renal function: ROCÍO improving, SCr 1.7?0.7 today.  Baseline SCr ~0.5 from June 2025  Currently on vancomycin 750mg IV q24h.   Given improvement in renal function, will increase dose of vancomycin to 750mg IV q12h  Kinetics predict an AUC = 482 mg/L*h with an estimated steady-state vancomycin trough = 10.3 mcg/mL  Level ordered for tomorrow AM, Fri 7/4 to confirm kinetic estimates   Will continue to monitor clinical condition and make adjustments to regimen as appropriate.    Thank you for consulting pharmacy,    Please call with questions--  Kalli Qureshi, PharmD, [facility]  Wireless: [phone]   7/3/2025 11:57 AM        Interval update:  ROCÍO improved, SCr 1.8?0.7. Surgery recommending to transfer to  for continuing of care.     Subjective/Objective:   Barbra Cortés is a 25 y.o. male with a PMHx significant for adjustment disorder, colonic and perineal Crohn's s/p lap TAC with end ileostomy, chronic abdominal pain, sacral wounds who presented with 3day history of abdominal pain. 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    Pharmacy is consulted to dose Vancomycin.    Ht Readings from Last 1 Encounters:   07/01/25 1.93 m (6' 4\")     Wt Readings from Last 1 Encounters:   07/03/25 59 kg (130 lb 1.1 oz)     Current & Prior Antimicrobial Regimen(s):  Zosyn (7/1-current)  Vancomycin - Pharmacy to dose  1500mg IV x1 7/1  750mg IV q24h (7/2)  750mg IV q12h (7/3-current)    Vancomycin Level(s) / Doses:    Date Time Dose Type of Level / Level 
The [facility] -  Clinical Pharmacy Note    Vancomycin - Management by Pharmacy    Consult Date(s): 7/2/25  Consulting Provider(s): BOUCHRA Cassidy    Assessment / Plan  Sepsis - Vancomycin  Concurrent Antimicrobials:  Zosyn   Day of Vanc Therapy / Ordered Duration: 14   Per ID, plan to continue with ertapenem through 7/24/25   Current Dosing Method: Intermittent via Levels  Therapeutic Goal: Trough ~ 15 mg/L  Current Dose / Plan:   Renal function: ROCÍO; SCr stable today at 1.3  Baseline SCr is ~0.5  UOP continues to be adequate; documented as 2.7 mL/kg/hr over prior 24 hours   Given ROCÍO, will continue to dose vancomycin based on intermittent levels for now.  Last dose of vancomycin was given 7/12 AM  Level today = 11.9 mg/L  Will redose with vancomycin 1000mg IV x1 today  Level ordered for tomorrow AM, Tues 7/15 to assess timing of next dose  Will continue to monitor clinical condition and make adjustments to regimen as appropriate    Please call with questions--  Kalli Qureshi, PharmD, BCPS  Wireless: [phone]   7/14/2025 9:42 AM          Interval update:  Midline placed 7/11. CM working on placement. Pt refusing medications, vitals and assessment this AM.     Subjective/Objective: Barbra Cortés is a 25 y.o. male with a PMHx significant for adjustment disorder, colonic and perineal Crohn's s/p lap TAC with end ileostomy, chronic abdominal pain, sacral wounds who presented with 3day history of abdominal pain. 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    Pharmacy is consulted to dose vancomycin.    Ht Readings from Last 1 Encounters:   07/01/25 1.93 m (6' 4\")     Wt Readings from Last 1 Encounters:   07/14/25 60.7 kg (133 lb 13.1 oz)     Current & Prior Antimicrobial Regimen(s):  Zosyn (7/1-current)  Vancomycin - Pharmacy to dose  1500mg IV x1 7/1  750mg IV q24h (7/2)  750mg IV q12h (7/3-7/6)  1000mg IV q8h (7/6-7/7)  1250mg IV q12h (7/7-7/11)  1500mg 
The [facility] -  Clinical Pharmacy Note    Vancomycin - Management by Pharmacy    Consult Date(s): 7/2/25  Consulting Provider(s): BOUCHRA Cassidy    Assessment / Plan  Sepsis - Vancomycin  Concurrent Antimicrobials:  Zosyn   Day of Vanc Therapy / Ordered Duration: 12 of tbd   Current Dosing Method: Bayesian-Guided AUC Dosing  Therapeutic Goal: -600 mg/L*hr  Current Dose / Plan:   Renal function: SCr bumped yesterday, 0.5?0.9.   No new SCr yet today.   Spoke with RN this AM - attempting to draw labs now, as pt refused this AM.    Documented UOP of 3.1 mL/kg/hr over prior 24 hours   Will monitor closely  On vancomycin 1500mg IV q12h - dose adjusted yesterday   Regimen predicts AUC = 505 and trough 9.6 per elevated SCr yesterday.     Plan for a level 7/13 AM to assess.      Addendum @ 13:00 -  SCr continues to increase, now 1.2 today.    Pt received vancomycin 1500mg IV this AM.    Will stop scheduled vancomycin at this time.  Placeholder entered.  Plan for random level 7/13 AM to reassess.      Will continue to monitor clinical condition and make adjustments to regimen as appropriate    Please call with questions--  Brittny MosleyD., BCPS   7/12/2025 7:51 AM  Wireless: [phone]          Interval update:  Documented UOP ~3.1 mL/kg/hr.  No new SCr yet today; RN attempting to collect now, as pt refused this AM.        Subjective/Objective: Barbra Cortés is a 25 y.o. male with a PMHx significant for adjustment disorder, colonic and perineal Crohn's s/p lap TAC with end ileostomy, chronic abdominal pain, sacral wounds who presented with 3day history of abdominal pain. 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    Pharmacy is consulted to dose vancomycin.    Ht Readings from Last 1 Encounters:   07/01/25 1.93 m (6' 4\")     Wt Readings from Last 1 Encounters:   07/12/25 61.2 kg (134 lb 14.7 oz)     Current & Prior Antimicrobial Regimen(s):  Zosyn 
Wound care completed partially. Patient would not allow thorough wound cleaning and medication application to wounds. Osotmy appliance changed.   
 Food and Nutrient Intake, Supplement Intake  Physical Signs/Symptoms Outcomes:  Biochemical Data, GI Status, Nutrition Focused Physical Findings, Skin, Weight     OBJECTIVE DATA: Significant to nutrition assessment  Nutrition Related Findings: + ileostomy output; 800 mL total 7/6. Labs reviewed; Na+ 135. Mag 1.36: replaced. Phos 2.1. Gluc .  Wounds: Multiple, Pressure Injury  Nutrition Goals: PO intake 50% or greater, by next RD assessment     CURRENT NUTRITION THERAPIES  ADULT ORAL NUTRITION SUPPLEMENT; Breakfast, Lunch, Dinner; Standard High Calorie/High Protein Oral Supplement  ADULT DIET; Regular; GI [place] (GERD/Peptic Ulcer); Low Fiber  PO Intake: 0%, 26-50%, %   PO Supplement Intake:Refusing to take  Additional Sources of Calories/IVF:n/a     COMPARATIVE STANDARDS  Energy (kcal):  1763 - 1984 (32 - 36 kcal/kg CBW)     Protein (g):  72 - 99 (1.3 - 1.8 gm/kg CBW)       Fluid (ml/day):  1 ml/kcal    ANTHROPOMETRICS  Current Height: 193 cm (6' 4\")  Current Weight - Scale: 64.5 kg (142 lb 3.2 oz)    Admission weight: 55.1 kg (121 lb 8 oz)    The patient will be monitored per nutrition standards of care. Consult dietitian if additional nutrition interventions are needed prior to RD reassessment.     Elissa Duncan RD  [place]:  [phone]       
they are not ready for). Lastklaudia states patient is originally from home, and she's okay with him returning home if he's able to get up and care for himself; as she has a \"special needs\" daughter and \"handicapped\" nephew that she cares for.   Before patient went to the hospital at the beginning of the year, he was able to ambulate independently (stayed in the basement) and has been changing his own colostomy for the last 3-4 years. She has no DME for him at home.\" Pt answers limited questions about PLOF however reports he was able to get dressed and bathe on his own at [facility]. Says he was participating in PT/OT but does not say what they were doing \"it changed from day to day\" \" I can't rememeber\". Pt says it has been \"a long time\" since he got out of bed or walked  Prior Function  Additional Comments: Pt from [facility] however per CM \" (of [facility]) spoke with patients Aunt and explained they cannot take patient back as he refuses all care. They have been by to  his belongings since that discussion.  I called patients Aunt Dimitri who stated [facility] never told her that patient couldn't return but she did go and  his staff as she was afraid they would lose it. I informed her that [facility] is not agreeable to taking patient back unless he is under hospice care (which they are not ready for). Quynh states patient is originally from home, and she's okay with him returning home if he's able to get up and care for himself; as she has a \"special needs\" daughter and \"handicapped\" nephew that she cares for.   Before patient went to the hospital at the beginning of the year, he was able to ambulate independently (stayed in the basement) and has been changing his own colostomy for the last 3-4 years. She has no DME for him at home.\" Pt answers limited questions about PLOF however reports he was able to get dressed and bathe on his own at [facility]. Says he was 
Score : 26.42  Mobility Inpatient CMS 0-100% Score: 92.36  Mobility Inpatient CMS G-Code Modifier : CM         Therapy Time   Individual Concurrent Group Co-treatment   Time In        1354   Time Out        1447   Minutes        53   Timed Code Treatment Minutes: 53 Minutes        Kamla Childs PT, DPT 104160         
of body and on blankets and gown. Pt agreeable to get cleaned up. Expresses pain but not does not rate pain. RN at bedside to give pain medication during session.  General Comment  Comments: pt recieving blood transfusion during session, RN okay'd therapy tx session     Social/Functional History  Social/Functional History  Additional Comments: Pt from [facility] however per CM \" (of [facility]) spoke with patients Aunt and explained they cannot take patient back as he refuses all care. They have been by to  his belongings since that discussion.  I called patients Aunt Dimitri who stated [facility] never told her that patient couldn't return but she did go and  his staff as she was afraid they would lose it. I informed her that [facility] is not agreeable to taking patient back unless he is under hospice care (which they are not ready for). Quynh states patient is originally from home, and she's okay with him returning home if he's able to get up and care for himself; as she has a \"special needs\" daughter and \"handicapped\" nephew that she cares for.   Before patient went to the hospital at the beginning of the year, he was able to ambulate independently (stayed in the basement) and has been changing his own colostomy for the last 3-4 years. She has no DME for him at home.\" Pt answers limited questions about PLOF however reports he was able to get dressed and bathe on his own at [facility]. Says he was participating in PT/OT but does not say what they were doing \"it changed from day to day\" \" I can't rememeber\". Pt says it has been \"a long time\" since he got out of bed or walked    Objective  Observation/Palpation  Observation: stool leaking from ostomy site; wounds throughout- refer to wound care RN evaluation  Safety Devices  Type of Devices: Nurse notified;Left in bed;Call light within reach;Bed alarm in place (rolled to R side, mother at bedside, call light in place)      
chloride, magnesium sulfate, ondansetron **OR** ondansetron      Physical Exam Performed:      General appearance: drowsy, cachetic   Chest/Lungs: Normal effort with no accessory muscle use on RA  Cardiovascular: Tachycardic  Abdomen: Scaphoid abdomen, moderately tender LLQ, non-peritonitic, ostomy appears pink and well-perfused. Healed surgical scars   Skin: Warm and dry, no rashes  Extremities: No edema, no cyanosis  Genitourinary: Sacral wound  dressing saturated with moderate drainage. Patient refused further examination.   Neuro: A&Ox3, no focal deficits, sensation intact            /67   Pulse (!) 112   Temp 98.6 °F (37 °C) (Oral)   Resp 14   Ht 1.93 m (6' 4\")   Wt 64.5 kg (142 lb 3.2 oz)   SpO2 98%   BMI 17.31 kg/m²     Telemetry:      Personally reviewed and interpreted telemetry (Rhythm Strip) on 7/7/2025.  Patient is currently ON tele demonstrating Sinus Tachycardia w/ rate > 100.    Diet: ADULT ORAL NUTRITION SUPPLEMENT; Breakfast, Lunch, Dinner; Standard High Calorie/High Protein Oral Supplement  ADULT DIET; Regular; GI [place] (GERD/Peptic Ulcer); Low Fiber    DVT Prophylaxis: PPX dose LMWH    Code status: Full Code    PT/OT Eval Status: Ordered and pending    Multi-Disciplinary Rounds with Case Management completed on 7/7/2025 with the following recs:     Anticipated Discharge Location:      Anticipated Discharge Day/Date:      Barriers to Discharge: Clinical Course    Likely rate limiting factor:     --------------------------------------------------    MDM (any 2 required for High level billing)    A. Problems (any 1)  [] Acute/Chronic Illness/injury posing ongoing threat to life and/or bodily function without ongoing treatment    [] Severe exacerbation of chronic illness    --------------------------------------------------  B. Risk of Treatment (any 1)    [] Drugs/treatments that require intensive monitoring for toxicity    [x] IV ABX (Vancomycin, Aminoglycosides, etc)     [] 
Anticoagulation (Heparin gtt or Coumadin - other anticoagulants in special circumstances)    [] Cardiac Medications (IV Amiodarone/Diltiazem, Tikosyn, etc)    [] Hemodialysis    [] Other -    [] Change in code status    [] Decision to escalate care    [] Major surgery/procedure with associated risk factors    --------------------------------------------------  C. Data (any 2)    [] Data Review (any 3)    [] Consultant/subspecialist notes from yesterday/today    [x] All available current labs reviewed interpreted for clinical significance    [x] Appropriate follow-up labs were ordered  [] Collateral history obtained     [] Independent Interpretation of tests (any 1)    [] Telemetry (Rhythm Strip) personally reviewed and interpreted        [x] Imaging personally reviewed and interpreted     [x] Discussion (any 1)  [x] Multi-Disciplinary Rounds with Case Management  [] Discussed management of the case with           Labs:  Personally reviewed on 7/3/2025 and interpreted for clinical significance as documented above.     Recent Labs     07/01/25 1733 07/02/25 0605 07/03/25  1041   WBC 22.1* 12.7* 11.3*   HGB 9.6* 8.7* 7.9*   HCT 31.0* 27.8* 25.1*   * 422 394     Recent Labs     07/01/25 1733 07/02/25 0605 07/02/25 2016 07/03/25  1041   * 128*  --  131*   K 3.9 4.4  --  2.6*   CL 80* 88*  --  93*   CO2 26 25  --  26   BUN 38* 32*  --  11   CREATININE 2.5* 1.8*  --  0.7*   CALCIUM 10.1 9.2  --  8.9   MG 1.90 1.28* 1.39* 2.04   PHOS 4.8 3.1 2.3* 1.8*     No results for input(s): \"PROBNP\", \"TROPHS\" in the last 72 hours.  No results for input(s): \"LABA1C\" in the last 72 hours.  Recent Labs     07/01/25 1733 07/02/25  0605   AST 20 19   ALT 8* 9*   BILIDIR  --  <0.1   BILITOT 0.6 0.3   ALKPHOS 139* 94     Recent Labs     07/01/25  1734   LACTA 3.5*       Urine Cultures: No results found for: \"LABURIN\"  Blood Cultures:   Lab Results   Component Value Date/Time    BC  07/01/2025 07:05 PM     Gram stain 
refusing care it can be done as outpt. Will not consider other GDMT as he is refusing most cares     Chronic pain - see discussion above . Please DO NOT GIVE IV NARCOTICS . Its thought he had functional pain before so even oral narcotics needs to be minimized     Anemia (chronic) Likely in setting of chronic disease and inflammation. Hb slowly dropping to 7.5. monitor     Mood Disorder (chronic)  Non adherence to medical treatment  -Intermittent refusal of labs, treatment  -Telehealth psychiatry continues to follow, appreciate recs  -TSH 0.71, A1c 5.5, Vit D low - added weekly Vit D   -Per psych recs discontinued duloxetine 30 mg po daily,   -Increase mirtazapine to 45 mg po HS (7/12)  -Continue aripiprazole 10 mg po daily. (7/12)  -Continue to monitor for worsening mood      DVT Ppx:lovenox    Code: full      Medical Decision Making:  The following items were considered in medical decision making:  Discussion of patient care with other providers. Consultant Notes reviewed  Reviewed clinical lab tests Independently if any  Microbiology cultures and other micro tests if any  Reviewed radiology tests Independently if any  Reviewed other diagnostic tests/interventions  Discussed the discharge plan in detail with case management      Subjective:     Not very cooperative. I explained that I will be discharging him as he is refusing most cares and exams. He was not happy about it       Review of Systems:      Pertinent positives and negatives discussed in HPI    Objective:     Intake/Output Summary (Last 24 hours) at 7/15/2025 1128  Last data filed at 7/15/2025 0917  Gross per 24 hour   Intake 1200 ml   Output 5700 ml   Net -4500 ml      Vitals:   Vitals:    07/14/25 2335 07/15/25 0339 07/15/25 0423 07/15/25 0917   BP: 130/88 (!) 129/91  (!) 132/90   Pulse: (!) 111 (!) 116  (!) 124   Resp: 16 18  16   Temp: 99 °F (37.2 °C) 98.5 °F (36.9 °C)  98.9 °F (37.2 °C)   TempSrc: Oral Oral  Oral   SpO2: 98% 97%  98%   Weight:   
  Topical QHS    scopolamine  1 patch TransDERmal Q72H    pantoprazole  40 mg Oral QAM AC    enoxaparin  40 mg SubCUTAneous Daily    polyethylene glycol  17 g Oral QHS    sodium chloride flush  5-40 mL IntraVENous 2 times per day     PRN Meds: morphine, oxyCODONE **OR** oxyCODONE, morphine, iopamidol, midodrine, acetaminophen, promethazine, sodium chloride flush, sodium chloride, potassium chloride **OR** potassium alternative oral replacement **OR** potassium chloride, magnesium sulfate, ondansetron **OR** ondansetron      Intake/Output Summary (Last 24 hours) at 7/10/2025 1106  Last data filed at 7/10/2025 0857  Gross per 24 hour   Intake 600 ml   Output 2730 ml   Net -2130 ml       Physical Exam Performed:    BP (!) 147/101   Pulse (!) 156   Temp 98.9 °F (37.2 °C) (Oral)   Resp 18   Ht 1.93 m (6' 4\")   Wt 61.2 kg (134 lb 14.7 oz)   SpO2 100%   BMI 16.42 kg/m²     General appearance: No apparent distress, appears stated age and cooperative.  HEENT: Pupils equal, round.   Respiratory:  Normal respiratory effort.   Cardiovascular: Sinus tach  Musculoskeletal:  Full range of motion without deformity.  Skin: Skin color, texture, turgor normal.  Ostomy intact; changing today  Neurologic: No focal deficit  Psychiatric: Alert and oriented, irritated    Labs:   Recent Labs     07/08/25  1613 07/09/25  0511 07/10/25  0227   WBC 12.6* 10.9 12.8*   HGB 7.5* 7.2* 7.2*   HCT 24.7* 22.7* 22.9*    426 420     Recent Labs     07/08/25  1613 07/09/25  0511 07/10/25  0227    139 137   K 3.5 3.7 3.7    106 103   CO2 22 25 24   BUN 5* 7 5*   CREATININE 0.5* 0.6* 0.5*   CALCIUM 8.0* 8.0* 8.3   PHOS 2.9  --   --      No results for input(s): \"AST\", \"ALT\", \"BILIDIR\", \"BILITOT\", \"ALKPHOS\" in the last 72 hours.  No results for input(s): \"INR\" in the last 72 hours.  No results for input(s): \"CKTOTAL\", \"TROPONINI\" in the last 72 hours.    Urinalysis:      Lab Results   Component Value Date/Time    NITRU Negative 
    BMP:    Recent Labs     07/12/25  1057 07/13/25  0419 07/14/25  0842    141 140   K 3.1* 3.5 2.7*    103 102   CO2 25 24 23   BUN 6* 8 6*   CREATININE 1.2 1.3 1.3   GLUCOSE 106* 109* 100*     Hepatic: No results for input(s): \"AST\", \"ALT\", \"BILITOT\", \"ALKPHOS\" in the last 72 hours.    Invalid input(s): \"ALB\"  Lipids:   Lab Results   Component Value Date/Time    CHOL 58 07/09/2025 05:11 AM    HDL 24 07/09/2025 05:11 AM    TRIG 75 07/09/2025 05:11 AM     Hemoglobin A1C:   Lab Results   Component Value Date/Time    LABA1C 5.5 07/09/2025 05:11 AM     TSH: No results found for: \"TSH\"  Troponin: No results found for: \"TROPONINT\"  Lactic Acid: No results for input(s): \"LACTA\" in the last 72 hours.  BNP: No results for input(s): \"PROBNP\" in the last 72 hours.  UA:  Lab Results   Component Value Date/Time    NITRU Negative 07/02/2025 10:35 PM    COLORU Yellow 07/02/2025 10:35 PM    PHUR 6.0 07/02/2025 10:35 PM    WBCUA 6-9 07/02/2025 10:35 PM    RBCUA 11-20 07/02/2025 10:35 PM    BACTERIA 1+ 07/02/2025 10:35 PM    CLARITYU Clear 07/02/2025 10:35 PM    LEUKOCYTESUR SMALL 07/02/2025 10:35 PM    UROBILINOGEN 0.2 07/02/2025 10:35 PM    BILIRUBINUR Negative 07/02/2025 10:35 PM    BLOODU LARGE 07/02/2025 10:35 PM    GLUCOSEU Negative 07/02/2025 10:35 PM    KETUA TRACE 07/02/2025 10:35 PM     Urine Cultures: No results found for: \"LABURIN\"  Blood Cultures:   Lab Results   Component Value Date/Time    BC See additional report for complete BCID panel. 07/01/2025 07:05 PM    BC  07/01/2025 07:05 PM     POSITIVE  This organism was isolated in one set.  Susceptibility testing is not routinely done as this  organism frequently represents skin contamination.  Additional testing can be ordered by calling the  Microbiology Department.       Lab Results   Component Value Date/Time    BLOODCULT2 No Growth after 4 days of incubation. 07/02/2025 02:47 AM     Organism:   Lab Results   Component Value Date/Time    ORG 
small bowel in the right lower quadrant. Ileostomy noted right lower quadrant without signs of any obstruction. Subtotal or total colectomy noted   3. Calyceal calculi throughout the mid and inferior posterior right kidney   4. Cholelithiasis again noted with stones layering in the dependent portion of the gallbladder   5. Small right-sided pleural effusion is new compared to prior study.      Electronically signed by Compa Swartz DO      XR CHEST PORTABLE   Final Result   1.  No evidence of acute cardiopulmonary disease.      Electronically signed by Brody Rain MD      CT ABDOMEN PELVIS W IV CONTRAST Additional Contrast? None   Final Result   1. The patient is status post subtotal colectomy with creation of a Macrina's pouch and right lower quadrant ileostomy. There is a focally dilated loop of small bowel with transition point of the distal small bowel near its entry to ostomy. Proximal end    of the dilated loop of small bowel is also decompressed near the more distal transition point. Findings may reflect a closed loop obstruction. Water-soluble contrast ileostomy enema may be helpful to to further characterize.   2. Cholelithiasis without CT evidence of cholecystitis.             Electronically signed by MD Shara Rucker, APRN - CNP    NOTE:  This report was transcribed using voice recognition software.  Every effort was made to ensure accuracy; however, inadvertent computerized transcription errors may be present.

## 2025-07-16 LAB — CREAT UR-MCNC: 10.4 MG/DL (ref 39–259)

## 2025-07-16 NOTE — TELEPHONE ENCOUNTER
Spoke with Manuela, nurse at [facility], and verified OPAT orders -   Vancomycin 1 gm iv q 24 through 7/24  Ertapenem 1 gm iv daily through 7/24  CBC w diff, CMP, ESR, CRP vancomycin trough every Mon or Tue, FAX results to [phone]  - Call with antibiotic / infusion issues, [phone]  - Call with any change in status, transfer in or out of a facility or to hospital - [phone]  - Orders only valid for current disposition, NOT transferable upon discharge from facility or new admission to another facility.  Catarina OCAMPO correctly    Lab day in the facility is Tuesday

## 2025-07-16 NOTE — TELEPHONE ENCOUNTER
Left message with DON to please return call to this office, ask for Priyanka to verify IV ATB and weekly lab orders (I will be in clinic all morning)  My name, title, Dr Floyd's name, specialty and affiliation  Pt name and +  Office number [phone]

## 2025-07-16 NOTE — TELEPHONE ENCOUNTER
Please dec dose to IV vanc 1gm q24hr due to ROCÍO in hospital and most recent inpatient pharmacy note.    We will likely end course as planned.

## 2025-07-17 LAB — BACTERIA BLD CULT ORG #2: NORMAL

## 2025-07-19 LAB — BACTERIA BLD CULT: NORMAL

## 2025-07-21 ENCOUNTER — TELEPHONE (OUTPATIENT)
Dept: INFECTIOUS DISEASES | Age: 25
End: 2025-07-21

## 2025-07-21 NOTE — TELEPHONE ENCOUNTER
OPAT Nurse Coordinator Weekly Update Note    Current OPAT plan:  Vancomycin 1 gm iv q 24 through 7/24  Ertapenem 1 gm iv daily through 7/24    Diagnosis:  sacral wound infection      Assessment:  spoke with Catarina, nurse caring for pt today at [facility].  Pt is refusing all wound care treatments and all oral meds.  Pt wound is not healing.  Pt is not working with therapy.  Pt is accespting IV ATB.  Pt had care conference with Aunt invaladria.  Aunt had a marquez talk with him, telling him he is not going to get any beeter if he does not allow wound care and his meds.  Pt will act like he will do as his is supposed to, but then refuses all care.  Pt is tolerating IV ATB wothout adverse SE  Pt is seen each Wednesday by the NP and wound care as ordered.    Follow up appts:

## 2025-07-21 NOTE — TELEPHONE ENCOUNTER
Unfortunate case with poor prognosis of healing.  We will end OPAT as planned.  Continue local wound care as he allows.

## 2025-07-23 NOTE — TELEPHONE ENCOUNTER
Spoke with Catarina, nurse at [facility], and gave VO to end IV ATB and remove PICC after dose tomorrow.  Catarina GARCIA correctly

## 2025-07-25 NOTE — TELEPHONE ENCOUNTER
Spoke with HEIKE who stated PICC is scheduled to come out tomorrow    Will call Monday to verify line has been pulled

## 2025-08-01 PROBLEM — E86.0 DEHYDRATION: Status: RESOLVED | Noted: 2025-07-02 | Resolved: 2025-08-01
